# Patient Record
Sex: FEMALE | Race: WHITE | NOT HISPANIC OR LATINO | Employment: OTHER | URBAN - METROPOLITAN AREA
[De-identification: names, ages, dates, MRNs, and addresses within clinical notes are randomized per-mention and may not be internally consistent; named-entity substitution may affect disease eponyms.]

---

## 2017-01-12 ENCOUNTER — COMMUNICATION - HEALTHEAST (OUTPATIENT)
Dept: FAMILY MEDICINE | Facility: CLINIC | Age: 62
End: 2017-01-12

## 2017-01-25 ENCOUNTER — OFFICE VISIT - HEALTHEAST (OUTPATIENT)
Dept: FAMILY MEDICINE | Facility: CLINIC | Age: 62
End: 2017-01-25

## 2017-01-25 ENCOUNTER — COMMUNICATION - HEALTHEAST (OUTPATIENT)
Dept: SCHEDULING | Facility: CLINIC | Age: 62
End: 2017-01-25

## 2017-01-25 DIAGNOSIS — E78.5 HYPERLIPIDEMIA: ICD-10-CM

## 2017-01-25 DIAGNOSIS — E11.9 TYPE 2 DIABETES MELLITUS (H): ICD-10-CM

## 2017-01-25 DIAGNOSIS — R60.9 EDEMA: ICD-10-CM

## 2017-01-25 LAB — HBA1C MFR BLD: >14 % (ref 3.5–6)

## 2017-01-25 ASSESSMENT — MIFFLIN-ST. JEOR: SCORE: 1221.84

## 2017-01-26 ENCOUNTER — COMMUNICATION - HEALTHEAST (OUTPATIENT)
Dept: NURSING | Facility: CLINIC | Age: 62
End: 2017-01-26

## 2017-01-26 DIAGNOSIS — E66.9 OBESITY, UNSPECIFIED: ICD-10-CM

## 2017-01-26 LAB
ATRIAL RATE - MUSE: 62 BPM
DIASTOLIC BLOOD PRESSURE - MUSE: NORMAL MMHG
INTERPRETATION ECG - MUSE: NORMAL
P AXIS - MUSE: 17 DEGREES
PR INTERVAL - MUSE: 150 MS
QRS DURATION - MUSE: 90 MS
QT - MUSE: 440 MS
QTC - MUSE: 446 MS
R AXIS - MUSE: -7 DEGREES
SYSTOLIC BLOOD PRESSURE - MUSE: NORMAL MMHG
T AXIS - MUSE: 23 DEGREES
VENTRICULAR RATE- MUSE: 62 BPM

## 2017-02-02 ENCOUNTER — OFFICE VISIT - HEALTHEAST (OUTPATIENT)
Dept: NURSING | Facility: CLINIC | Age: 62
End: 2017-02-02

## 2017-02-02 ENCOUNTER — AMBULATORY - HEALTHEAST (OUTPATIENT)
Dept: NURSING | Facility: CLINIC | Age: 62
End: 2017-02-02

## 2017-02-02 ENCOUNTER — OFFICE VISIT - HEALTHEAST (OUTPATIENT)
Dept: FAMILY MEDICINE | Facility: CLINIC | Age: 62
End: 2017-02-02

## 2017-02-02 ENCOUNTER — COMMUNICATION - HEALTHEAST (OUTPATIENT)
Dept: NURSING | Facility: CLINIC | Age: 62
End: 2017-02-02

## 2017-02-02 DIAGNOSIS — E11.9 TYPE 2 DIABETES MELLITUS (H): ICD-10-CM

## 2017-02-02 DIAGNOSIS — E66.9 OBESITY, UNSPECIFIED: ICD-10-CM

## 2017-02-02 DIAGNOSIS — R60.9 EDEMA: ICD-10-CM

## 2017-02-02 ASSESSMENT — MIFFLIN-ST. JEOR: SCORE: 1162.88

## 2017-02-03 ASSESSMENT — MIFFLIN-ST. JEOR: SCORE: 1162.88

## 2017-02-05 ENCOUNTER — COMMUNICATION - HEALTHEAST (OUTPATIENT)
Dept: FAMILY MEDICINE | Facility: CLINIC | Age: 62
End: 2017-02-05

## 2017-02-06 ENCOUNTER — COMMUNICATION - HEALTHEAST (OUTPATIENT)
Dept: FAMILY MEDICINE | Facility: CLINIC | Age: 62
End: 2017-02-06

## 2017-02-09 ENCOUNTER — COMMUNICATION - HEALTHEAST (OUTPATIENT)
Dept: NURSING | Facility: CLINIC | Age: 62
End: 2017-02-09

## 2017-02-10 ENCOUNTER — COMMUNICATION - HEALTHEAST (OUTPATIENT)
Dept: NURSING | Facility: CLINIC | Age: 62
End: 2017-02-10

## 2017-02-14 ENCOUNTER — COMMUNICATION - HEALTHEAST (OUTPATIENT)
Dept: NURSING | Facility: CLINIC | Age: 62
End: 2017-02-14

## 2017-02-14 DIAGNOSIS — E66.9 OBESITY, UNSPECIFIED: ICD-10-CM

## 2017-02-14 DIAGNOSIS — E11.9 TYPE 2 DIABETES MELLITUS (H): ICD-10-CM

## 2017-02-15 ASSESSMENT — MIFFLIN-ST. JEOR: SCORE: 1117.52

## 2017-02-20 ENCOUNTER — HOSPITAL ENCOUNTER (OUTPATIENT)
Dept: CARDIOLOGY | Facility: CLINIC | Age: 62
Discharge: HOME | End: 2017-02-20
Attending: FAMILY MEDICINE

## 2017-02-20 DIAGNOSIS — R60.9 EDEMA: ICD-10-CM

## 2017-02-20 LAB
AORTIC ROOT: 2.9 CM
AORTIC VALVE MEAN VELOCITY: 98.2 CM/S
AV DIMENSIONLESS INDEX VTI: 0.7
AV MEAN GRADIENT: 4 MMHG
AV PEAK GRADIENT: 7.2 MMHG
AV VALVE AREA: 2.1 CM2
AV VELOCITY RATIO: 0.7
BSA FOR ECHO PROCEDURE: 1.65 M2
CV BLOOD PRESSURE: NORMAL MMHG
CV ECHO HEIGHT: 63 IN
CV ECHO WEIGHT: 135 LBS
DOP CALC AO PEAK VEL: 134 CM/S
DOP CALC AO VTI: 34.2 CM
DOP CALC LVOT AREA: 3.14 CM2
DOP CALC LVOT DIAMETER: 2 CM
DOP CALC LVOT PEAK VEL: 93 CM/S
DOP CALC LVOT STROKE VOLUME: 72.5 CM3
DOP CALCLVOT PEAK VEL VTI: 23.1 CM
EJECTION FRACTION: 64 % (ref 55–75)
INTERVENTRICULAR SEPTUM IN END DIASTOLE: 1.12 CM (ref 0.6–0.9)
IVS/PW RATIO: 1
LA AREA 1: 14.6 CM2
LA AREA 2: 23.2 CM2
LEFT ATRIUM LENGTH: 4.61 CM
LEFT ATRIUM SIZE: 3.2 CM
LEFT ATRIUM VOLUME INDEX: 37.9 ML/M2
LEFT ATRIUM VOLUME: 62.5 CM3
LEFT VENTRICLE CARDIAC INDEX: 3.2 L/MIN/M2
LEFT VENTRICLE CARDIAC OUTPUT: 5.2 L/MIN
LEFT VENTRICLE DIASTOLIC VOLUME INDEX: 37 CM3/M2 (ref 34–74)
LEFT VENTRICLE DIASTOLIC VOLUME: 61 CM3 (ref 46–106)
LEFT VENTRICLE HEART RATE: 72 BPM
LEFT VENTRICLE MASS INDEX: 117.6 G/M2
LEFT VENTRICLE SYSTOLIC VOLUME INDEX: 13.3 CM3/M2 (ref 11–31)
LEFT VENTRICLE SYSTOLIC VOLUME: 22 CM3 (ref 14–42)
LEFT VENTRICULAR INTERNAL DIMENSION IN DIASTOLE: 4.69 CM (ref 3.8–5.2)
LEFT VENTRICULAR MASS: 194.1 G
LEFT VENTRICULAR OUTFLOW TRACT MEAN GRADIENT: 2 MMHG
LEFT VENTRICULAR OUTFLOW TRACT MEAN VELOCITY: 56.2 CM/S
LEFT VENTRICULAR OUTFLOW TRACT PEAK GRADIENT: 3 MMHG
LEFT VENTRICULAR POSTERIOR WALL IN END DIASTOLE: 1.14 CM (ref 0.6–0.9)
LV STROKE VOLUME INDEX: 44 ML/M2
MITRAL VALVE E/A RATIO: 1.2
MV AVERAGE E/E' RATIO: 7.9 CM/S
MV DECELERATION TIME: 134 MS
MV E'TISSUE VEL-LAT: 9.75 CM/S
MV E'TISSUE VEL-MED: 9.16 CM/S
MV LATERAL E/E' RATIO: 7.7
MV MEDIAL E/E' RATIO: 8.2
MV PEAK A VELOCITY: 62.4 CM/S
MV PEAK E VELOCITY: 74.7 CM/S
NUC REST DIASTOLIC VOLUME INDEX: 2160 LBS
NUC REST SYSTOLIC VOLUME INDEX: 63 IN
PR MAX PG: 4 MMHG
PR PEAK VELOCITY: 105 CM/S
TRICUSPID REGURGITATION PEAK PRESSURE GRADIENT: 39.4 MMHG
TRICUSPID VALVE PEAK REGURGITANT VELOCITY: 314 CM/S

## 2017-02-20 RX ORDER — ASPIRIN 325 MG
325 TABLET ORAL DAILY
Status: SHIPPED | COMMUNITY
Start: 2017-02-20

## 2017-02-20 ASSESSMENT — MIFFLIN-ST. JEOR: SCORE: 1121.49

## 2017-02-21 ENCOUNTER — COMMUNICATION - HEALTHEAST (OUTPATIENT)
Dept: NURSING | Facility: CLINIC | Age: 62
End: 2017-02-21

## 2017-02-21 DIAGNOSIS — E11.9 TYPE 2 DIABETES MELLITUS (H): ICD-10-CM

## 2017-02-22 ENCOUNTER — OFFICE VISIT - HEALTHEAST (OUTPATIENT)
Dept: FAMILY MEDICINE | Facility: CLINIC | Age: 62
End: 2017-02-22

## 2017-02-22 DIAGNOSIS — I07.1 TRICUSPID REGURGITATION: ICD-10-CM

## 2017-02-22 DIAGNOSIS — M53.3 SI (SACROILIAC) PAIN: ICD-10-CM

## 2017-02-22 DIAGNOSIS — E11.9 TYPE 2 DIABETES MELLITUS (H): ICD-10-CM

## 2017-02-22 DIAGNOSIS — R60.9 EDEMA: ICD-10-CM

## 2017-02-22 ASSESSMENT — MIFFLIN-ST. JEOR: SCORE: 1094.27

## 2017-02-28 ENCOUNTER — AMBULATORY - HEALTHEAST (OUTPATIENT)
Dept: FAMILY MEDICINE | Facility: CLINIC | Age: 62
End: 2017-02-28

## 2017-02-28 DIAGNOSIS — R60.9 EDEMA: ICD-10-CM

## 2017-02-28 DIAGNOSIS — I07.1 TRICUSPID REGURGITATION: ICD-10-CM

## 2017-03-02 ENCOUNTER — COMMUNICATION - HEALTHEAST (OUTPATIENT)
Dept: NURSING | Facility: CLINIC | Age: 62
End: 2017-03-02

## 2017-03-02 DIAGNOSIS — E11.9 TYPE 2 DIABETES MELLITUS WITHOUT COMPLICATION, WITH LONG-TERM CURRENT USE OF INSULIN (H): ICD-10-CM

## 2017-03-02 DIAGNOSIS — Z79.4 TYPE 2 DIABETES MELLITUS WITHOUT COMPLICATION, WITH LONG-TERM CURRENT USE OF INSULIN (H): ICD-10-CM

## 2017-03-03 ENCOUNTER — HOSPITAL ENCOUNTER (OUTPATIENT)
Dept: CT IMAGING | Facility: CLINIC | Age: 62
Discharge: HOME | End: 2017-03-03
Attending: INTERNAL MEDICINE

## 2017-03-03 ENCOUNTER — OFFICE VISIT - HEALTHEAST (OUTPATIENT)
Dept: CARDIOLOGY | Facility: CLINIC | Age: 62
End: 2017-03-03

## 2017-03-03 ENCOUNTER — COMMUNICATION - HEALTHEAST (OUTPATIENT)
Dept: EDUCATION SERVICES | Facility: CLINIC | Age: 62
End: 2017-03-03

## 2017-03-03 DIAGNOSIS — R60.9 PITTING EDEMA: ICD-10-CM

## 2017-03-03 DIAGNOSIS — I27.20 PULMONARY HYPERTENSION, MILD (H): ICD-10-CM

## 2017-03-03 DIAGNOSIS — R07.9 CHEST PAIN AT REST: ICD-10-CM

## 2017-03-03 DIAGNOSIS — R06.09 EXERTIONAL DYSPNEA: ICD-10-CM

## 2017-03-03 DIAGNOSIS — D68.59 PRIMARY HYPERCOAGULABLE STATE (H): ICD-10-CM

## 2017-03-03 DIAGNOSIS — I36.1 TRICUSPID VALVE REGURGITATION, NONRHEUMATIC: ICD-10-CM

## 2017-03-03 DIAGNOSIS — I27.29 OTHER SECONDARY PULMONARY HYPERTENSION (H): ICD-10-CM

## 2017-03-03 DIAGNOSIS — R06.09 OTHER FORMS OF DYSPNEA: ICD-10-CM

## 2017-03-03 ASSESSMENT — MIFFLIN-ST. JEOR: SCORE: 1065.91

## 2017-03-06 ENCOUNTER — AMBULATORY - HEALTHEAST (OUTPATIENT)
Dept: CARDIOLOGY | Facility: CLINIC | Age: 62
End: 2017-03-06

## 2017-03-06 DIAGNOSIS — E11.9 DM (DIABETES MELLITUS) (H): ICD-10-CM

## 2017-03-06 DIAGNOSIS — R07.9 CHEST PAIN: ICD-10-CM

## 2017-03-06 DIAGNOSIS — R06.09 DYSPNEA ON EXERTION: ICD-10-CM

## 2017-03-06 DIAGNOSIS — O24.419 DM (DIABETES MELLITUS), GESTATIONAL: ICD-10-CM

## 2017-03-06 DIAGNOSIS — I51.7 CARDIOMEGALY: ICD-10-CM

## 2017-03-09 ENCOUNTER — HOSPITAL ENCOUNTER (OUTPATIENT)
Dept: CARDIOLOGY | Facility: HOSPITAL | Age: 62
Discharge: HOME | End: 2017-03-09
Attending: INTERNAL MEDICINE

## 2017-03-09 ENCOUNTER — COMMUNICATION - HEALTHEAST (OUTPATIENT)
Dept: NURSING | Facility: CLINIC | Age: 62
End: 2017-03-09

## 2017-03-09 DIAGNOSIS — E11.9 DM (DIABETES MELLITUS) (H): ICD-10-CM

## 2017-03-09 DIAGNOSIS — R07.9 CHEST PAIN: ICD-10-CM

## 2017-03-09 DIAGNOSIS — R06.09 OTHER FORMS OF DYSPNEA: ICD-10-CM

## 2017-03-09 DIAGNOSIS — R06.09 DYSPNEA ON EXERTION: ICD-10-CM

## 2017-03-09 DIAGNOSIS — I51.7 CARDIOMEGALY: ICD-10-CM

## 2017-03-09 LAB
CV STRESS CURRENT BP HE: NORMAL
CV STRESS CURRENT HR HE: 101
CV STRESS CURRENT HR HE: 102
CV STRESS CURRENT HR HE: 108
CV STRESS CURRENT HR HE: 109
CV STRESS CURRENT HR HE: 110
CV STRESS CURRENT HR HE: 111
CV STRESS CURRENT HR HE: 112
CV STRESS CURRENT HR HE: 121
CV STRESS CURRENT HR HE: 129
CV STRESS CURRENT HR HE: 134
CV STRESS CURRENT HR HE: 137
CV STRESS CURRENT HR HE: 137
CV STRESS CURRENT HR HE: 158
CV STRESS CURRENT HR HE: 158
CV STRESS CURRENT HR HE: 80
CV STRESS CURRENT HR HE: 82
CV STRESS CURRENT HR HE: 84
CV STRESS CURRENT HR HE: 84
CV STRESS CURRENT HR HE: 88
CV STRESS CURRENT HR HE: 90
CV STRESS CURRENT HR HE: 90
CV STRESS CURRENT HR HE: 92
CV STRESS CURRENT HR HE: 95
CV STRESS CURRENT HR HE: 97
CV STRESS CURRENT HR HE: 98
CV STRESS CURRENT HR HE: 99
CV STRESS DEVIATION TIME HE: NORMAL
CV STRESS ECHO PERCENT HR HE: NORMAL
CV STRESS EXERCISE STAGE HE: NORMAL
CV STRESS FINAL RESTING BP HE: NORMAL
CV STRESS FINAL RESTING HR HE: 95
CV STRESS MAX HR HE: 158
CV STRESS MAX TREADMILL GRADE HE: 14
CV STRESS MAX TREADMILL SPEED HE: 3.4
CV STRESS PEAK DIA BP HE: NORMAL
CV STRESS PEAK SYS BP HE: NORMAL
CV STRESS PHASE HE: NORMAL
CV STRESS PROTOCOL HE: NORMAL
CV STRESS RESTING PT POSITION HE: NORMAL
CV STRESS RESTING PT POSITION HE: NORMAL
CV STRESS ST DEVIATION AMOUNT HE: NORMAL
CV STRESS ST DEVIATION ELEVATION HE: NORMAL
CV STRESS ST EVELATION AMOUNT HE: NORMAL
CV STRESS TEST TYPE HE: NORMAL
CV STRESS TOTAL STAGE TIME MIN 1 HE: NORMAL
ECHO EJECTION FRACTION ESTIMATED: 65 %
STRESS ECHO BASELINE BP: NORMAL
STRESS ECHO BASELINE HR: 92
STRESS ECHO CALCULATED PERCENT HR: 99 %
STRESS ECHO LAST STRESS BP: NORMAL
STRESS ECHO LAST STRESS HR: 158
STRESS ECHO POST ESTIMATED WORKLOAD: 10.3
STRESS ECHO POST EXERCISE DUR MIN: 8
STRESS ECHO POST EXERCISE DUR SEC: 59
STRESS ECHO TARGET HR: 135

## 2017-03-14 ENCOUNTER — COMMUNICATION - HEALTHEAST (OUTPATIENT)
Dept: NURSING | Facility: CLINIC | Age: 62
End: 2017-03-14

## 2017-03-20 ENCOUNTER — COMMUNICATION - HEALTHEAST (OUTPATIENT)
Dept: FAMILY MEDICINE | Facility: CLINIC | Age: 62
End: 2017-03-20

## 2017-03-20 DIAGNOSIS — R60.9 PITTING EDEMA: ICD-10-CM

## 2017-03-29 ENCOUNTER — COMMUNICATION - HEALTHEAST (OUTPATIENT)
Dept: FAMILY MEDICINE | Facility: CLINIC | Age: 62
End: 2017-03-29

## 2017-03-31 ENCOUNTER — COMMUNICATION - HEALTHEAST (OUTPATIENT)
Dept: NURSING | Facility: CLINIC | Age: 62
End: 2017-03-31

## 2017-04-04 ENCOUNTER — OFFICE VISIT - HEALTHEAST (OUTPATIENT)
Dept: FAMILY MEDICINE | Facility: CLINIC | Age: 62
End: 2017-04-04

## 2017-04-04 ENCOUNTER — COMMUNICATION - HEALTHEAST (OUTPATIENT)
Dept: NURSING | Facility: CLINIC | Age: 62
End: 2017-04-04

## 2017-04-04 DIAGNOSIS — Z79.4 TYPE 2 DIABETES MELLITUS WITHOUT COMPLICATION, WITH LONG-TERM CURRENT USE OF INSULIN (H): ICD-10-CM

## 2017-04-04 DIAGNOSIS — I27.20 PULMONARY HYPERTENSION, MILD (H): ICD-10-CM

## 2017-04-04 DIAGNOSIS — E11.9 TYPE 2 DIABETES MELLITUS WITHOUT COMPLICATION, WITH LONG-TERM CURRENT USE OF INSULIN (H): ICD-10-CM

## 2017-04-04 DIAGNOSIS — R60.9 PITTING EDEMA: ICD-10-CM

## 2017-04-04 DIAGNOSIS — Z92.89 HISTORY OF MAMMOGRAPHY, SCREENING: ICD-10-CM

## 2017-04-04 DIAGNOSIS — R63.4 UNEXPLAINED WEIGHT LOSS: ICD-10-CM

## 2017-04-05 LAB
BASOPHILS # BLD AUTO: 0 THOU/UL (ref 0–0.2)
BASOPHILS NFR BLD AUTO: 0 % (ref 0–2)
EOSINOPHIL # BLD AUTO: 0.1 THOU/UL (ref 0–0.4)
EOSINOPHIL NFR BLD AUTO: 1 % (ref 0–6)
ERYTHROCYTE [DISTWIDTH] IN BLOOD BY AUTOMATED COUNT: 12.1 % (ref 11–14.5)
HCT VFR BLD AUTO: 41.2 % (ref 35–47)
HGB BLD-MCNC: 13.6 G/DL (ref 12–16)
LAB AP CHARGES (HE HISTORICAL CONVERSION): NORMAL
LYMPHOCYTES # BLD AUTO: 2 THOU/UL (ref 0.8–4.4)
LYMPHOCYTES NFR BLD AUTO: 30 % (ref 20–40)
MCH RBC QN AUTO: 25.6 PG (ref 27–34)
MCHC RBC AUTO-ENTMCNC: 33 G/DL (ref 32–36)
MCV RBC AUTO: 77 FL (ref 80–100)
MONOCYTES # BLD AUTO: 0.4 THOU/UL (ref 0–0.9)
MONOCYTES NFR BLD AUTO: 6 % (ref 2–10)
NEUTROPHILS # BLD AUTO: 4.3 THOU/UL (ref 2–7.7)
NEUTROPHILS NFR BLD AUTO: 63 % (ref 50–70)
PATH REPORT.COMMENTS IMP SPEC: NORMAL
PATH REPORT.COMMENTS IMP SPEC: NORMAL
PATH REPORT.FINAL DX SPEC: NORMAL
PATH REPORT.MICROSCOPIC SPEC OTHER STN: ABNORMAL
PATH REPORT.MICROSCOPIC SPEC OTHER STN: NORMAL
PATH REPORT.RELEVANT HX SPEC: NORMAL
PLATELET # BLD AUTO: 366 THOU/UL (ref 140–440)
PMV BLD AUTO: 10.3 FL (ref 8.5–12.5)
RBC # BLD AUTO: 5.32 MILL/UL (ref 3.8–5.4)
WBC: 6.9 THOU/UL (ref 4–11)

## 2017-04-06 ENCOUNTER — AMBULATORY - HEALTHEAST (OUTPATIENT)
Dept: EDUCATION SERVICES | Facility: CLINIC | Age: 62
End: 2017-04-06

## 2017-04-06 DIAGNOSIS — E11.9 TYPE 2 DIABETES MELLITUS WITHOUT COMPLICATION, WITH LONG-TERM CURRENT USE OF INSULIN (H): ICD-10-CM

## 2017-04-06 DIAGNOSIS — Z79.4 TYPE 2 DIABETES MELLITUS WITHOUT COMPLICATION, WITH LONG-TERM CURRENT USE OF INSULIN (H): ICD-10-CM

## 2017-04-06 LAB
GLIADIN IGA SER-ACNC: 0.8 U/ML
GLIADIN IGG SER-ACNC: 0.4 U/ML
IGA SERPL-MCNC: 190 MG/DL (ref 65–400)
TTG IGA SER-ACNC: 0.5 U/ML
TTG IGG SER-ACNC: 0.8 U/ML

## 2017-04-07 LAB
ALBUMIN PERCENT: 68 % (ref 51–67)
ALBUMIN SERPL ELPH-MCNC: 4.3 G/DL (ref 3.2–4.7)
ALPHA 1 PERCENT: 3 % (ref 2–4)
ALPHA 2 PERCENT: 10.1 % (ref 5–13)
ALPHA1 GLOB SERPL ELPH-MCNC: 0.2 G/DL (ref 0.1–0.3)
ALPHA2 GLOB SERPL ELPH-MCNC: 0.6 G/DL (ref 0.4–0.9)
B-GLOBULIN SERPL ELPH-MCNC: 0.7 G/DL (ref 0.7–1.2)
BETA PERCENT: 10.6 % (ref 10–17)
GAMMA GLOB SERPL ELPH-MCNC: 0.5 G/DL (ref 0.6–1.4)
GAMMA GLOBULIN PERCENT: 8.3 % (ref 9–20)
PATH ICD:: ABNORMAL
PATH ICD:: NORMAL
PROT PATTERN SERPL ELPH-IMP: ABNORMAL
PROT PATTERN SERPL ELPH-IMP: NORMAL
PROT SERPL-MCNC: 6.3 G/DL (ref 6–8)
REVIEWING PATHOLOGIST: ABNORMAL
REVIEWING PATHOLOGIST: NORMAL
TOTAL PROTEIN RANDOM URINE MG/DL: 17 MG/DL

## 2017-04-11 ENCOUNTER — COMMUNICATION - HEALTHEAST (OUTPATIENT)
Dept: FAMILY MEDICINE | Facility: CLINIC | Age: 62
End: 2017-04-11

## 2017-04-12 ENCOUNTER — COMMUNICATION - HEALTHEAST (OUTPATIENT)
Dept: NURSING | Facility: CLINIC | Age: 62
End: 2017-04-12

## 2017-04-18 ENCOUNTER — COMMUNICATION - HEALTHEAST (OUTPATIENT)
Dept: FAMILY MEDICINE | Facility: CLINIC | Age: 62
End: 2017-04-18

## 2017-04-20 ENCOUNTER — COMMUNICATION - HEALTHEAST (OUTPATIENT)
Dept: NURSING | Facility: CLINIC | Age: 62
End: 2017-04-20

## 2017-04-24 ENCOUNTER — COMMUNICATION - HEALTHEAST (OUTPATIENT)
Dept: FAMILY MEDICINE | Facility: CLINIC | Age: 62
End: 2017-04-24

## 2017-04-25 ENCOUNTER — OFFICE VISIT - HEALTHEAST (OUTPATIENT)
Dept: FAMILY MEDICINE | Facility: CLINIC | Age: 62
End: 2017-04-25

## 2017-04-25 DIAGNOSIS — R60.9 PITTING EDEMA: ICD-10-CM

## 2017-04-25 DIAGNOSIS — Z12.31 SCREENING MAMMOGRAM, ENCOUNTER FOR: ICD-10-CM

## 2017-04-25 DIAGNOSIS — Z79.4 TYPE 2 DIABETES MELLITUS WITHOUT COMPLICATION, WITH LONG-TERM CURRENT USE OF INSULIN (H): ICD-10-CM

## 2017-04-25 DIAGNOSIS — E11.9 TYPE 2 DIABETES MELLITUS WITHOUT COMPLICATION, WITH LONG-TERM CURRENT USE OF INSULIN (H): ICD-10-CM

## 2017-04-25 DIAGNOSIS — I27.20 PULMONARY HYPERTENSION, MILD (H): ICD-10-CM

## 2017-04-25 LAB — HBA1C MFR BLD: 12.5 % (ref 3.5–6)

## 2017-04-25 ASSESSMENT — MIFFLIN-ST. JEOR: SCORE: 1006.94

## 2017-04-26 ENCOUNTER — COMMUNICATION - HEALTHEAST (OUTPATIENT)
Dept: FAMILY MEDICINE | Facility: CLINIC | Age: 62
End: 2017-04-26

## 2017-05-01 ENCOUNTER — HOSPITAL ENCOUNTER (OUTPATIENT)
Dept: CT IMAGING | Facility: CLINIC | Age: 62
Discharge: HOME | End: 2017-05-01
Attending: FAMILY MEDICINE

## 2017-05-01 DIAGNOSIS — R60.9 PITTING EDEMA: ICD-10-CM

## 2017-05-01 DIAGNOSIS — R63.4 UNEXPLAINED WEIGHT LOSS: ICD-10-CM

## 2017-05-02 ENCOUNTER — COMMUNICATION - HEALTHEAST (OUTPATIENT)
Dept: FAMILY MEDICINE | Facility: CLINIC | Age: 62
End: 2017-05-02

## 2017-05-02 DIAGNOSIS — N85.2 ENLARGED UTERUS: ICD-10-CM

## 2017-05-04 ENCOUNTER — COMMUNICATION - HEALTHEAST (OUTPATIENT)
Dept: NURSING | Facility: CLINIC | Age: 62
End: 2017-05-04

## 2017-05-04 ENCOUNTER — HOSPITAL ENCOUNTER (OUTPATIENT)
Dept: ULTRASOUND IMAGING | Facility: CLINIC | Age: 62
Discharge: HOME | End: 2017-05-04
Attending: FAMILY MEDICINE

## 2017-05-04 DIAGNOSIS — N85.2 ENLARGED UTERUS: ICD-10-CM

## 2017-05-05 ENCOUNTER — COMMUNICATION - HEALTHEAST (OUTPATIENT)
Dept: FAMILY MEDICINE | Facility: CLINIC | Age: 62
End: 2017-05-05

## 2017-05-19 ENCOUNTER — COMMUNICATION - HEALTHEAST (OUTPATIENT)
Dept: NURSING | Facility: CLINIC | Age: 62
End: 2017-05-19

## 2017-06-12 ENCOUNTER — COMMUNICATION - HEALTHEAST (OUTPATIENT)
Dept: NURSING | Facility: CLINIC | Age: 62
End: 2017-06-12

## 2017-06-19 ENCOUNTER — COMMUNICATION - HEALTHEAST (OUTPATIENT)
Dept: NURSING | Facility: CLINIC | Age: 62
End: 2017-06-19

## 2017-06-22 ENCOUNTER — OFFICE VISIT - HEALTHEAST (OUTPATIENT)
Dept: CARDIOLOGY | Facility: CLINIC | Age: 62
End: 2017-06-22

## 2017-06-22 DIAGNOSIS — R07.89 ATYPICAL CHEST PAIN: ICD-10-CM

## 2017-06-22 DIAGNOSIS — E11.9 TYPE 2 DIABETES MELLITUS WITHOUT COMPLICATION, WITH LONG-TERM CURRENT USE OF INSULIN (H): ICD-10-CM

## 2017-06-22 DIAGNOSIS — I36.1 TRICUSPID VALVE REGURGITATION, NONRHEUMATIC: ICD-10-CM

## 2017-06-22 DIAGNOSIS — I27.20 PULMONARY HYPERTENSION, MILD (H): ICD-10-CM

## 2017-06-22 DIAGNOSIS — Z79.4 TYPE 2 DIABETES MELLITUS WITHOUT COMPLICATION, WITH LONG-TERM CURRENT USE OF INSULIN (H): ICD-10-CM

## 2017-06-22 ASSESSMENT — MIFFLIN-ST. JEOR: SCORE: 1011.48

## 2017-06-27 ENCOUNTER — COMMUNICATION - HEALTHEAST (OUTPATIENT)
Dept: NURSING | Facility: CLINIC | Age: 62
End: 2017-06-27

## 2017-07-05 ENCOUNTER — COMMUNICATION - HEALTHEAST (OUTPATIENT)
Dept: NURSING | Facility: CLINIC | Age: 62
End: 2017-07-05

## 2017-07-10 ENCOUNTER — COMMUNICATION - HEALTHEAST (OUTPATIENT)
Dept: NURSING | Facility: CLINIC | Age: 62
End: 2017-07-10

## 2017-07-21 ENCOUNTER — COMMUNICATION - HEALTHEAST (OUTPATIENT)
Dept: NURSING | Facility: CLINIC | Age: 62
End: 2017-07-21

## 2017-07-27 ENCOUNTER — COMMUNICATION - HEALTHEAST (OUTPATIENT)
Dept: NURSING | Facility: CLINIC | Age: 62
End: 2017-07-27

## 2017-08-04 ENCOUNTER — COMMUNICATION - HEALTHEAST (OUTPATIENT)
Dept: NURSING | Facility: CLINIC | Age: 62
End: 2017-08-04

## 2017-08-04 DIAGNOSIS — E11.9 TYPE 2 DIABETES MELLITUS (H): ICD-10-CM

## 2017-08-08 ENCOUNTER — COMMUNICATION - HEALTHEAST (OUTPATIENT)
Dept: NURSING | Facility: CLINIC | Age: 62
End: 2017-08-08

## 2017-08-11 ENCOUNTER — COMMUNICATION - HEALTHEAST (OUTPATIENT)
Dept: NURSING | Facility: CLINIC | Age: 62
End: 2017-08-11

## 2017-08-14 ENCOUNTER — COMMUNICATION - HEALTHEAST (OUTPATIENT)
Dept: FAMILY MEDICINE | Facility: CLINIC | Age: 62
End: 2017-08-14

## 2017-08-14 DIAGNOSIS — Z79.4 TYPE 2 DIABETES MELLITUS WITHOUT COMPLICATION, WITH LONG-TERM CURRENT USE OF INSULIN (H): ICD-10-CM

## 2017-08-14 DIAGNOSIS — E11.9 TYPE 2 DIABETES MELLITUS WITHOUT COMPLICATION, WITH LONG-TERM CURRENT USE OF INSULIN (H): ICD-10-CM

## 2017-08-17 ENCOUNTER — COMMUNICATION - HEALTHEAST (OUTPATIENT)
Dept: NURSING | Facility: CLINIC | Age: 62
End: 2017-08-17

## 2017-08-23 ENCOUNTER — AMBULATORY - HEALTHEAST (OUTPATIENT)
Dept: CARE COORDINATION | Facility: CLINIC | Age: 62
End: 2017-08-23

## 2017-08-28 ENCOUNTER — COMMUNICATION - HEALTHEAST (OUTPATIENT)
Dept: NURSING | Facility: CLINIC | Age: 62
End: 2017-08-28

## 2017-10-20 ENCOUNTER — COMMUNICATION - HEALTHEAST (OUTPATIENT)
Dept: FAMILY MEDICINE | Facility: CLINIC | Age: 62
End: 2017-10-20

## 2017-10-20 DIAGNOSIS — E11.9 TYPE 2 DIABETES MELLITUS WITHOUT COMPLICATION, WITH LONG-TERM CURRENT USE OF INSULIN (H): ICD-10-CM

## 2017-10-20 DIAGNOSIS — Z79.4 TYPE 2 DIABETES MELLITUS WITHOUT COMPLICATION, WITH LONG-TERM CURRENT USE OF INSULIN (H): ICD-10-CM

## 2017-11-15 ENCOUNTER — COMMUNICATION - HEALTHEAST (OUTPATIENT)
Dept: FAMILY MEDICINE | Facility: CLINIC | Age: 62
End: 2017-11-15

## 2017-11-17 ENCOUNTER — COMMUNICATION - HEALTHEAST (OUTPATIENT)
Dept: FAMILY MEDICINE | Facility: CLINIC | Age: 62
End: 2017-11-17

## 2017-11-17 DIAGNOSIS — E11.9 TYPE 2 DIABETES MELLITUS WITHOUT COMPLICATION, WITH LONG-TERM CURRENT USE OF INSULIN (H): ICD-10-CM

## 2017-11-17 DIAGNOSIS — Z79.4 TYPE 2 DIABETES MELLITUS WITHOUT COMPLICATION, WITH LONG-TERM CURRENT USE OF INSULIN (H): ICD-10-CM

## 2017-12-11 ENCOUNTER — COMMUNICATION - HEALTHEAST (OUTPATIENT)
Dept: FAMILY MEDICINE | Facility: CLINIC | Age: 62
End: 2017-12-11

## 2017-12-11 ENCOUNTER — OFFICE VISIT - HEALTHEAST (OUTPATIENT)
Dept: FAMILY MEDICINE | Facility: CLINIC | Age: 62
End: 2017-12-11

## 2017-12-11 DIAGNOSIS — Z12.31 SCREENING MAMMOGRAM, ENCOUNTER FOR: ICD-10-CM

## 2017-12-11 DIAGNOSIS — Z79.4 TYPE 2 DIABETES MELLITUS WITHOUT COMPLICATION, WITH LONG-TERM CURRENT USE OF INSULIN (H): ICD-10-CM

## 2017-12-11 DIAGNOSIS — E78.00 PURE HYPERCHOLESTEROLEMIA: ICD-10-CM

## 2017-12-11 DIAGNOSIS — I27.20 PULMONARY HYPERTENSION, MILD (H): ICD-10-CM

## 2017-12-11 DIAGNOSIS — Z23 NEED FOR VACCINATION: ICD-10-CM

## 2017-12-11 DIAGNOSIS — Z12.11 SCREEN FOR COLON CANCER: ICD-10-CM

## 2017-12-11 DIAGNOSIS — D68.59 PRIMARY HYPERCOAGULABLE STATE (H): ICD-10-CM

## 2017-12-11 DIAGNOSIS — E11.9 TYPE 2 DIABETES MELLITUS WITHOUT COMPLICATION, WITH LONG-TERM CURRENT USE OF INSULIN (H): ICD-10-CM

## 2017-12-11 DIAGNOSIS — I36.1 TRICUSPID VALVE REGURGITATION, NONRHEUMATIC: ICD-10-CM

## 2017-12-11 DIAGNOSIS — Z00.00 ROUTINE GENERAL MEDICAL EXAMINATION AT A HEALTH CARE FACILITY: ICD-10-CM

## 2017-12-11 LAB
CHOLEST SERPL-MCNC: 158 MG/DL
FASTING STATUS PATIENT QL REPORTED: NORMAL
HBA1C MFR BLD: 10.1 % (ref 3.5–6)
HDLC SERPL-MCNC: 68 MG/DL
LDLC SERPL CALC-MCNC: 78 MG/DL
TRIGL SERPL-MCNC: 60 MG/DL

## 2017-12-11 ASSESSMENT — MIFFLIN-ST. JEOR: SCORE: 1021.47

## 2017-12-14 LAB
HPV INTERPRETATION - HISTORICAL: NORMAL
HPV INTERPRETER - HISTORICAL: NORMAL

## 2017-12-18 ENCOUNTER — COMMUNICATION - HEALTHEAST (OUTPATIENT)
Dept: ADMINISTRATIVE | Facility: CLINIC | Age: 62
End: 2017-12-18

## 2018-01-10 ENCOUNTER — COMMUNICATION - HEALTHEAST (OUTPATIENT)
Dept: FAMILY MEDICINE | Facility: CLINIC | Age: 63
End: 2018-01-10

## 2018-02-09 ENCOUNTER — COMMUNICATION - HEALTHEAST (OUTPATIENT)
Dept: ADMINISTRATIVE | Facility: CLINIC | Age: 63
End: 2018-02-09

## 2018-02-19 ENCOUNTER — COMMUNICATION - HEALTHEAST (OUTPATIENT)
Dept: FAMILY MEDICINE | Facility: CLINIC | Age: 63
End: 2018-02-19

## 2018-02-19 DIAGNOSIS — E78.5 HYPERLIPIDEMIA: ICD-10-CM

## 2018-03-29 ENCOUNTER — COMMUNICATION - HEALTHEAST (OUTPATIENT)
Dept: ADMINISTRATIVE | Facility: CLINIC | Age: 63
End: 2018-03-29

## 2018-04-06 ENCOUNTER — COMMUNICATION - HEALTHEAST (OUTPATIENT)
Dept: NURSING | Facility: CLINIC | Age: 63
End: 2018-04-06

## 2018-04-07 ENCOUNTER — COMMUNICATION - HEALTHEAST (OUTPATIENT)
Dept: NURSING | Facility: CLINIC | Age: 63
End: 2018-04-07

## 2018-04-07 DIAGNOSIS — E11.9 TYPE 2 DIABETES MELLITUS (H): ICD-10-CM

## 2018-04-08 ENCOUNTER — COMMUNICATION - HEALTHEAST (OUTPATIENT)
Dept: FAMILY MEDICINE | Facility: CLINIC | Age: 63
End: 2018-04-08

## 2018-04-08 DIAGNOSIS — E11.9 TYPE 2 DIABETES MELLITUS (H): ICD-10-CM

## 2018-04-09 ENCOUNTER — COMMUNICATION - HEALTHEAST (OUTPATIENT)
Dept: NURSING | Facility: CLINIC | Age: 63
End: 2018-04-09

## 2018-04-23 ENCOUNTER — RECORDS - HEALTHEAST (OUTPATIENT)
Dept: ADMINISTRATIVE | Facility: OTHER | Age: 63
End: 2018-04-23

## 2018-06-07 ENCOUNTER — COMMUNICATION - HEALTHEAST (OUTPATIENT)
Dept: FAMILY MEDICINE | Facility: CLINIC | Age: 63
End: 2018-06-07

## 2018-06-07 DIAGNOSIS — E11.9 TYPE 2 DIABETES MELLITUS WITHOUT COMPLICATION, WITH LONG-TERM CURRENT USE OF INSULIN (H): ICD-10-CM

## 2018-06-07 DIAGNOSIS — Z79.4 TYPE 2 DIABETES MELLITUS WITHOUT COMPLICATION, WITH LONG-TERM CURRENT USE OF INSULIN (H): ICD-10-CM

## 2018-07-25 ENCOUNTER — COMMUNICATION - HEALTHEAST (OUTPATIENT)
Dept: FAMILY MEDICINE | Facility: CLINIC | Age: 63
End: 2018-07-25

## 2018-09-04 ENCOUNTER — COMMUNICATION - HEALTHEAST (OUTPATIENT)
Dept: FAMILY MEDICINE | Facility: CLINIC | Age: 63
End: 2018-09-04

## 2018-09-04 DIAGNOSIS — E11.9 TYPE 2 DIABETES MELLITUS WITHOUT COMPLICATION, WITH LONG-TERM CURRENT USE OF INSULIN (H): ICD-10-CM

## 2018-09-04 DIAGNOSIS — Z79.4 TYPE 2 DIABETES MELLITUS WITHOUT COMPLICATION, WITH LONG-TERM CURRENT USE OF INSULIN (H): ICD-10-CM

## 2018-09-26 ENCOUNTER — AMBULATORY - HEALTHEAST (OUTPATIENT)
Dept: FAMILY MEDICINE | Facility: CLINIC | Age: 63
End: 2018-09-26

## 2018-09-26 DIAGNOSIS — E11.9 DIABETES MELLITUS, TYPE 2 (H): ICD-10-CM

## 2018-12-02 ENCOUNTER — COMMUNICATION - HEALTHEAST (OUTPATIENT)
Dept: FAMILY MEDICINE | Facility: CLINIC | Age: 63
End: 2018-12-02

## 2018-12-02 DIAGNOSIS — E11.9 TYPE 2 DIABETES MELLITUS WITHOUT COMPLICATION, WITH LONG-TERM CURRENT USE OF INSULIN (H): ICD-10-CM

## 2018-12-02 DIAGNOSIS — Z79.4 TYPE 2 DIABETES MELLITUS WITHOUT COMPLICATION, WITH LONG-TERM CURRENT USE OF INSULIN (H): ICD-10-CM

## 2018-12-06 ENCOUNTER — COMMUNICATION - HEALTHEAST (OUTPATIENT)
Dept: FAMILY MEDICINE | Facility: CLINIC | Age: 63
End: 2018-12-06

## 2018-12-06 DIAGNOSIS — Z79.4 TYPE 2 DIABETES MELLITUS WITHOUT COMPLICATION, WITH LONG-TERM CURRENT USE OF INSULIN (H): ICD-10-CM

## 2018-12-06 DIAGNOSIS — E11.9 TYPE 2 DIABETES MELLITUS WITHOUT COMPLICATION, WITH LONG-TERM CURRENT USE OF INSULIN (H): ICD-10-CM

## 2018-12-29 ENCOUNTER — COMMUNICATION - HEALTHEAST (OUTPATIENT)
Dept: FAMILY MEDICINE | Facility: CLINIC | Age: 63
End: 2018-12-29

## 2018-12-29 DIAGNOSIS — Z79.4 TYPE 2 DIABETES MELLITUS WITHOUT COMPLICATION, WITH LONG-TERM CURRENT USE OF INSULIN (H): ICD-10-CM

## 2018-12-29 DIAGNOSIS — E11.9 TYPE 2 DIABETES MELLITUS WITHOUT COMPLICATION, WITH LONG-TERM CURRENT USE OF INSULIN (H): ICD-10-CM

## 2019-01-02 ENCOUNTER — COMMUNICATION - HEALTHEAST (OUTPATIENT)
Dept: FAMILY MEDICINE | Facility: CLINIC | Age: 64
End: 2019-01-02

## 2019-01-02 DIAGNOSIS — Z79.4 TYPE 2 DIABETES MELLITUS WITHOUT COMPLICATION, WITH LONG-TERM CURRENT USE OF INSULIN (H): ICD-10-CM

## 2019-01-02 DIAGNOSIS — E11.9 TYPE 2 DIABETES MELLITUS WITHOUT COMPLICATION, WITH LONG-TERM CURRENT USE OF INSULIN (H): ICD-10-CM

## 2019-02-09 ENCOUNTER — COMMUNICATION - HEALTHEAST (OUTPATIENT)
Dept: FAMILY MEDICINE | Facility: CLINIC | Age: 64
End: 2019-02-09

## 2019-02-09 DIAGNOSIS — E78.5 HYPERLIPIDEMIA: ICD-10-CM

## 2019-02-21 ENCOUNTER — OFFICE VISIT - HEALTHEAST (OUTPATIENT)
Dept: FAMILY MEDICINE | Facility: CLINIC | Age: 64
End: 2019-02-21

## 2019-02-21 ENCOUNTER — COMMUNICATION - HEALTHEAST (OUTPATIENT)
Dept: FAMILY MEDICINE | Facility: CLINIC | Age: 64
End: 2019-02-21

## 2019-02-21 ENCOUNTER — COMMUNICATION - HEALTHEAST (OUTPATIENT)
Dept: TELEHEALTH | Facility: CLINIC | Age: 64
End: 2019-02-21

## 2019-02-21 DIAGNOSIS — Z79.4 TYPE 2 DIABETES MELLITUS WITH DIABETIC POLYNEUROPATHY, WITH LONG-TERM CURRENT USE OF INSULIN (H): ICD-10-CM

## 2019-02-21 DIAGNOSIS — Z12.31 VISIT FOR SCREENING MAMMOGRAM: ICD-10-CM

## 2019-02-21 DIAGNOSIS — E11.42 TYPE 2 DIABETES MELLITUS WITH DIABETIC POLYNEUROPATHY, WITH LONG-TERM CURRENT USE OF INSULIN (H): ICD-10-CM

## 2019-02-21 DIAGNOSIS — Z12.11 SCREEN FOR COLON CANCER: ICD-10-CM

## 2019-02-21 LAB
ALBUMIN SERPL-MCNC: 4.2 G/DL (ref 3.5–5)
ALP SERPL-CCNC: 102 U/L (ref 45–120)
ALT SERPL W P-5'-P-CCNC: 27 U/L (ref 0–45)
ANION GAP SERPL CALCULATED.3IONS-SCNC: 7 MMOL/L (ref 5–18)
AST SERPL W P-5'-P-CCNC: 21 U/L (ref 0–40)
BILIRUB SERPL-MCNC: 0.6 MG/DL (ref 0–1)
BUN SERPL-MCNC: 8 MG/DL (ref 8–22)
CALCIUM SERPL-MCNC: 9.8 MG/DL (ref 8.5–10.5)
CHLORIDE BLD-SCNC: 93 MMOL/L (ref 98–107)
CO2 SERPL-SCNC: 42 MMOL/L (ref 22–31)
CREAT SERPL-MCNC: 0.73 MG/DL (ref 0.6–1.1)
GFR SERPL CREATININE-BSD FRML MDRD: >60 ML/MIN/1.73M2
GLUCOSE BLD-MCNC: 217 MG/DL (ref 70–125)
HBA1C MFR BLD: 13.7 % (ref 3.5–6)
POTASSIUM BLD-SCNC: 3.1 MMOL/L (ref 3.5–5)
PROT SERPL-MCNC: 6.8 G/DL (ref 6–8)
SODIUM SERPL-SCNC: 142 MMOL/L (ref 136–145)

## 2019-02-21 ASSESSMENT — MIFFLIN-ST. JEOR: SCORE: 1127.15

## 2019-02-24 RX ORDER — GLUCOSAMINE HCL/CHONDROITIN SU 500-400 MG
CAPSULE ORAL
Qty: 100 STRIP | Refills: 11 | Status: SHIPPED | OUTPATIENT
Start: 2019-02-24 | End: 2022-11-09

## 2019-03-04 ENCOUNTER — COMMUNICATION - HEALTHEAST (OUTPATIENT)
Dept: FAMILY MEDICINE | Facility: CLINIC | Age: 64
End: 2019-03-04

## 2019-03-04 DIAGNOSIS — E87.6 HYPOKALEMIA: ICD-10-CM

## 2019-03-04 DIAGNOSIS — R06.89 HYPERCARBIA: ICD-10-CM

## 2019-03-06 ENCOUNTER — AMBULATORY - HEALTHEAST (OUTPATIENT)
Dept: FAMILY MEDICINE | Facility: CLINIC | Age: 64
End: 2019-03-06

## 2019-03-06 ENCOUNTER — AMBULATORY - HEALTHEAST (OUTPATIENT)
Dept: LAB | Facility: CLINIC | Age: 64
End: 2019-03-06

## 2019-03-06 ENCOUNTER — COMMUNICATION - HEALTHEAST (OUTPATIENT)
Dept: FAMILY MEDICINE | Facility: CLINIC | Age: 64
End: 2019-03-06

## 2019-03-06 DIAGNOSIS — E87.6 HYPOKALEMIA: ICD-10-CM

## 2019-03-06 DIAGNOSIS — R06.89 HYPERCARBIA: ICD-10-CM

## 2019-03-06 LAB
ANION GAP SERPL CALCULATED.3IONS-SCNC: 16 MMOL/L (ref 5–18)
BUN SERPL-MCNC: 11 MG/DL (ref 8–22)
CALCIUM SERPL-MCNC: 9.4 MG/DL (ref 8.5–10.5)
CHLORIDE BLD-SCNC: 91 MMOL/L (ref 98–107)
CO2 SERPL-SCNC: 36 MMOL/L (ref 22–31)
CREAT SERPL-MCNC: 0.69 MG/DL (ref 0.6–1.1)
GFR SERPL CREATININE-BSD FRML MDRD: >60 ML/MIN/1.73M2
GLUCOSE BLD-MCNC: 280 MG/DL (ref 70–125)
KETONES BLD-SCNC: 0.21 MMOL/L
POTASSIUM BLD-SCNC: 2.7 MMOL/L (ref 3.5–5)
SODIUM SERPL-SCNC: 143 MMOL/L (ref 136–145)

## 2019-03-07 ENCOUNTER — HOSPITAL ENCOUNTER (OUTPATIENT)
Dept: MAMMOGRAPHY | Facility: CLINIC | Age: 64
Discharge: HOME | End: 2019-03-07
Attending: FAMILY MEDICINE

## 2019-03-07 ENCOUNTER — COMMUNICATION - HEALTHEAST (OUTPATIENT)
Dept: FAMILY MEDICINE | Facility: CLINIC | Age: 64
End: 2019-03-07

## 2019-03-07 DIAGNOSIS — Z12.31 VISIT FOR SCREENING MAMMOGRAM: ICD-10-CM

## 2019-03-13 ENCOUNTER — COMMUNICATION - HEALTHEAST (OUTPATIENT)
Dept: FAMILY MEDICINE | Facility: CLINIC | Age: 64
End: 2019-03-13

## 2019-03-13 ENCOUNTER — COMMUNICATION - HEALTHEAST (OUTPATIENT)
Dept: SCHEDULING | Facility: CLINIC | Age: 64
End: 2019-03-13

## 2019-03-13 ENCOUNTER — AMBULATORY - HEALTHEAST (OUTPATIENT)
Dept: LAB | Facility: CLINIC | Age: 64
End: 2019-03-13

## 2019-03-13 DIAGNOSIS — E11.9 TYPE 2 DIABETES MELLITUS WITHOUT COMPLICATION, WITH LONG-TERM CURRENT USE OF INSULIN (H): ICD-10-CM

## 2019-03-13 DIAGNOSIS — Z79.4 TYPE 2 DIABETES MELLITUS WITHOUT COMPLICATION, WITH LONG-TERM CURRENT USE OF INSULIN (H): ICD-10-CM

## 2019-03-13 DIAGNOSIS — E87.6 HYPOKALEMIA: ICD-10-CM

## 2019-03-13 LAB
ANION GAP SERPL CALCULATED.3IONS-SCNC: 14 MMOL/L (ref 5–18)
BUN SERPL-MCNC: 10 MG/DL (ref 8–22)
CALCIUM SERPL-MCNC: 9.3 MG/DL (ref 8.5–10.5)
CHLORIDE BLD-SCNC: 94 MMOL/L (ref 98–107)
CO2 SERPL-SCNC: 35 MMOL/L (ref 22–31)
CREAT SERPL-MCNC: 0.77 MG/DL (ref 0.6–1.1)
GFR SERPL CREATININE-BSD FRML MDRD: >60 ML/MIN/1.73M2
GLUCOSE BLD-MCNC: 410 MG/DL (ref 70–125)
POTASSIUM BLD-SCNC: 2.6 MMOL/L (ref 3.5–5)
SODIUM SERPL-SCNC: 143 MMOL/L (ref 136–145)

## 2019-03-15 LAB
ANION GAP SERPL CALCULATED.3IONS-SCNC: 18 MMOL/L (ref 5–18)
BUN SERPL-MCNC: 9 MG/DL (ref 8–22)
CALCIUM SERPL-MCNC: 9.8 MG/DL (ref 8.5–10.5)
CHLORIDE BLD-SCNC: 97 MMOL/L (ref 98–107)
CO2 SERPL-SCNC: 30 MMOL/L (ref 22–31)
CREAT SERPL-MCNC: 0.6 MG/DL (ref 0.6–1.1)
GFR SERPL CREATININE-BSD FRML MDRD: >60 ML/MIN/1.73M2
GLUCOSE BLD-MCNC: 165 MG/DL (ref 70–125)
MAGNESIUM SERPL-MCNC: 1.8 MG/DL (ref 1.8–2.6)
POTASSIUM BLD-SCNC: 3 MMOL/L (ref 3.5–5)
SODIUM SERPL-SCNC: 145 MMOL/L (ref 136–145)

## 2019-03-21 ENCOUNTER — COMMUNICATION - HEALTHEAST (OUTPATIENT)
Dept: LAB | Facility: CLINIC | Age: 64
End: 2019-03-21

## 2019-03-21 DIAGNOSIS — E87.6 HYPOKALEMIA: ICD-10-CM

## 2019-03-22 ENCOUNTER — AMBULATORY - HEALTHEAST (OUTPATIENT)
Dept: LAB | Facility: CLINIC | Age: 64
End: 2019-03-22

## 2019-03-22 DIAGNOSIS — E87.6 HYPOKALEMIA: ICD-10-CM

## 2019-03-22 LAB
ANION GAP SERPL CALCULATED.3IONS-SCNC: 12 MMOL/L (ref 5–18)
BUN SERPL-MCNC: 9 MG/DL (ref 8–22)
CALCIUM SERPL-MCNC: 9.8 MG/DL (ref 8.5–10.5)
CHLORIDE BLD-SCNC: 96 MMOL/L (ref 98–107)
CO2 SERPL-SCNC: 36 MMOL/L (ref 22–31)
CREAT SERPL-MCNC: 0.65 MG/DL (ref 0.6–1.1)
GFR SERPL CREATININE-BSD FRML MDRD: >60 ML/MIN/1.73M2
GLUCOSE BLD-MCNC: 206 MG/DL (ref 70–125)
POTASSIUM BLD-SCNC: 3.2 MMOL/L (ref 3.5–5)
SODIUM SERPL-SCNC: 144 MMOL/L (ref 136–145)

## 2019-03-25 ENCOUNTER — AMBULATORY - HEALTHEAST (OUTPATIENT)
Dept: FAMILY MEDICINE | Facility: CLINIC | Age: 64
End: 2019-03-25

## 2019-03-25 DIAGNOSIS — E87.6 HYPOKALEMIA: ICD-10-CM

## 2019-03-29 ENCOUNTER — OFFICE VISIT - HEALTHEAST (OUTPATIENT)
Dept: EDUCATION SERVICES | Facility: CLINIC | Age: 64
End: 2019-03-29

## 2019-03-29 ENCOUNTER — COMMUNICATION - HEALTHEAST (OUTPATIENT)
Dept: FAMILY MEDICINE | Facility: CLINIC | Age: 64
End: 2019-03-29

## 2019-03-29 ENCOUNTER — AMBULATORY - HEALTHEAST (OUTPATIENT)
Dept: LAB | Facility: CLINIC | Age: 64
End: 2019-03-29

## 2019-03-29 DIAGNOSIS — E11.9 TYPE 2 DIABETES MELLITUS WITHOUT COMPLICATION, WITHOUT LONG-TERM CURRENT USE OF INSULIN (H): ICD-10-CM

## 2019-03-29 DIAGNOSIS — E87.6 HYPOKALEMIA: ICD-10-CM

## 2019-03-29 DIAGNOSIS — Z79.4 TYPE 2 DIABETES MELLITUS WITH DIABETIC POLYNEUROPATHY, WITH LONG-TERM CURRENT USE OF INSULIN (H): ICD-10-CM

## 2019-03-29 DIAGNOSIS — Z79.4 TYPE 2 DIABETES MELLITUS WITHOUT COMPLICATION, WITH LONG-TERM CURRENT USE OF INSULIN (H): ICD-10-CM

## 2019-03-29 DIAGNOSIS — E11.42 TYPE 2 DIABETES MELLITUS WITH DIABETIC POLYNEUROPATHY, WITH LONG-TERM CURRENT USE OF INSULIN (H): ICD-10-CM

## 2019-03-29 DIAGNOSIS — E11.9 TYPE 2 DIABETES MELLITUS WITHOUT COMPLICATION, WITH LONG-TERM CURRENT USE OF INSULIN (H): ICD-10-CM

## 2019-03-29 LAB
ANION GAP SERPL CALCULATED.3IONS-SCNC: 11 MMOL/L (ref 5–18)
BUN SERPL-MCNC: 11 MG/DL (ref 8–22)
CALCIUM SERPL-MCNC: 9.8 MG/DL (ref 8.5–10.5)
CHLORIDE BLD-SCNC: 101 MMOL/L (ref 98–107)
CO2 SERPL-SCNC: 31 MMOL/L (ref 22–31)
CREAT SERPL-MCNC: 0.65 MG/DL (ref 0.6–1.1)
CREAT UR-MCNC: 111.4 MG/DL
GFR SERPL CREATININE-BSD FRML MDRD: >60 ML/MIN/1.73M2
GLUCOSE BLD-MCNC: 97 MG/DL (ref 70–125)
MICROALBUMIN UR-MCNC: 17.62 MG/DL (ref 0–1.99)
MICROALBUMIN/CREAT UR: 158.2 MG/G
POTASSIUM BLD-SCNC: 4.4 MMOL/L (ref 3.5–5)
SODIUM SERPL-SCNC: 143 MMOL/L (ref 136–145)

## 2019-03-29 RX ORDER — FLASH GLUCOSE SENSOR
1 KIT MISCELLANEOUS DAILY
Qty: 1 EACH | Refills: 0 | Status: SHIPPED | OUTPATIENT
Start: 2019-03-29 | End: 2024-07-30 | Stop reason: ALTCHOICE

## 2019-03-29 RX ORDER — LANCETS 30 GAUGE
1 EACH MISCELLANEOUS 4 TIMES DAILY
Qty: 100 EACH | Refills: 3 | Status: SHIPPED | OUTPATIENT
Start: 2019-03-29

## 2019-04-08 ENCOUNTER — RECORDS - HEALTHEAST (OUTPATIENT)
Dept: ADMINISTRATIVE | Facility: OTHER | Age: 64
End: 2019-04-08

## 2019-04-09 ENCOUNTER — RECORDS - HEALTHEAST (OUTPATIENT)
Dept: ADMINISTRATIVE | Facility: OTHER | Age: 64
End: 2019-04-09

## 2019-04-16 ENCOUNTER — RECORDS - HEALTHEAST (OUTPATIENT)
Dept: HEALTH INFORMATION MANAGEMENT | Facility: CLINIC | Age: 64
End: 2019-04-16

## 2019-05-07 ENCOUNTER — COMMUNICATION - HEALTHEAST (OUTPATIENT)
Dept: FAMILY MEDICINE | Facility: CLINIC | Age: 64
End: 2019-05-07

## 2019-05-07 DIAGNOSIS — E78.5 HYPERLIPIDEMIA: ICD-10-CM

## 2019-06-11 ENCOUNTER — RECORDS - HEALTHEAST (OUTPATIENT)
Dept: ADMINISTRATIVE | Facility: OTHER | Age: 64
End: 2019-06-11

## 2019-06-13 ENCOUNTER — COMMUNICATION - HEALTHEAST (OUTPATIENT)
Dept: NURSING | Facility: CLINIC | Age: 64
End: 2019-06-13

## 2019-06-13 DIAGNOSIS — E11.9 TYPE 2 DIABETES MELLITUS (H): ICD-10-CM

## 2019-07-03 ENCOUNTER — OFFICE VISIT - HEALTHEAST (OUTPATIENT)
Dept: FAMILY MEDICINE | Facility: CLINIC | Age: 64
End: 2019-07-03

## 2019-07-03 DIAGNOSIS — E11.9 TYPE 2 DIABETES MELLITUS WITHOUT COMPLICATION, WITH LONG-TERM CURRENT USE OF INSULIN (H): ICD-10-CM

## 2019-07-03 DIAGNOSIS — E87.6 HYPOKALEMIA: ICD-10-CM

## 2019-07-03 DIAGNOSIS — Z79.4 TYPE 2 DIABETES MELLITUS WITHOUT COMPLICATION, WITH LONG-TERM CURRENT USE OF INSULIN (H): ICD-10-CM

## 2019-07-03 LAB
ANION GAP SERPL CALCULATED.3IONS-SCNC: 9 MMOL/L (ref 5–18)
BUN SERPL-MCNC: 16 MG/DL (ref 8–22)
CALCIUM SERPL-MCNC: 9.7 MG/DL (ref 8.5–10.5)
CHLORIDE BLD-SCNC: 102 MMOL/L (ref 98–107)
CO2 SERPL-SCNC: 28 MMOL/L (ref 22–31)
CREAT SERPL-MCNC: 0.7 MG/DL (ref 0.6–1.1)
GFR SERPL CREATININE-BSD FRML MDRD: >60 ML/MIN/1.73M2
GLUCOSE BLD-MCNC: 174 MG/DL (ref 70–125)
HBA1C MFR BLD: 13.8 % (ref 3.5–6)
POTASSIUM BLD-SCNC: 4.7 MMOL/L (ref 3.5–5)
SODIUM SERPL-SCNC: 139 MMOL/L (ref 136–145)

## 2019-07-03 ASSESSMENT — MIFFLIN-ST. JEOR: SCORE: 1133.96

## 2019-10-23 ENCOUNTER — COMMUNICATION - HEALTHEAST (OUTPATIENT)
Dept: FAMILY MEDICINE | Facility: CLINIC | Age: 64
End: 2019-10-23

## 2019-10-23 DIAGNOSIS — E87.6 HYPOKALEMIA: ICD-10-CM

## 2019-12-11 ENCOUNTER — COMMUNICATION - HEALTHEAST (OUTPATIENT)
Dept: FAMILY MEDICINE | Facility: CLINIC | Age: 64
End: 2019-12-11

## 2019-12-11 DIAGNOSIS — Z79.4 TYPE 2 DIABETES MELLITUS WITH DIABETIC POLYNEUROPATHY, WITH LONG-TERM CURRENT USE OF INSULIN (H): ICD-10-CM

## 2019-12-11 DIAGNOSIS — E11.42 TYPE 2 DIABETES MELLITUS WITH DIABETIC POLYNEUROPATHY, WITH LONG-TERM CURRENT USE OF INSULIN (H): ICD-10-CM

## 2020-02-03 ENCOUNTER — COMMUNICATION - HEALTHEAST (OUTPATIENT)
Dept: FAMILY MEDICINE | Facility: CLINIC | Age: 65
End: 2020-02-03

## 2020-02-03 DIAGNOSIS — E78.5 HYPERLIPIDEMIA: ICD-10-CM

## 2020-02-21 ENCOUNTER — COMMUNICATION - HEALTHEAST (OUTPATIENT)
Dept: FAMILY MEDICINE | Facility: CLINIC | Age: 65
End: 2020-02-21

## 2020-02-26 ENCOUNTER — NURSE TRIAGE (OUTPATIENT)
Dept: NURSING | Facility: CLINIC | Age: 65
End: 2020-02-26

## 2020-02-26 ENCOUNTER — OFFICE VISIT - HEALTHEAST (OUTPATIENT)
Dept: FAMILY MEDICINE | Facility: CLINIC | Age: 65
End: 2020-02-26

## 2020-02-26 ENCOUNTER — COMMUNICATION - HEALTHEAST (OUTPATIENT)
Dept: SCHEDULING | Facility: CLINIC | Age: 65
End: 2020-02-26

## 2020-02-26 DIAGNOSIS — E87.6 HYPOKALEMIA: ICD-10-CM

## 2020-02-26 DIAGNOSIS — I27.20 PULMONARY HYPERTENSION, MILD (H): ICD-10-CM

## 2020-02-26 DIAGNOSIS — Z79.4 TYPE 2 DIABETES MELLITUS WITHOUT COMPLICATION, WITH LONG-TERM CURRENT USE OF INSULIN (H): ICD-10-CM

## 2020-02-26 DIAGNOSIS — E11.9 TYPE 2 DIABETES MELLITUS WITHOUT COMPLICATION, WITH LONG-TERM CURRENT USE OF INSULIN (H): ICD-10-CM

## 2020-02-26 DIAGNOSIS — D68.59 PRIMARY HYPERCOAGULABLE STATE (H): ICD-10-CM

## 2020-02-26 LAB
ALBUMIN SERPL-MCNC: 4.3 G/DL (ref 3.5–5)
ALP SERPL-CCNC: 95 U/L (ref 45–120)
ALT SERPL W P-5'-P-CCNC: 33 U/L (ref 0–45)
ANION GAP SERPL CALCULATED.3IONS-SCNC: 12 MMOL/L (ref 5–18)
AST SERPL W P-5'-P-CCNC: 26 U/L (ref 0–40)
BILIRUB SERPL-MCNC: 0.3 MG/DL (ref 0–1)
BUN SERPL-MCNC: 16 MG/DL (ref 8–22)
CALCIUM SERPL-MCNC: 10.2 MG/DL (ref 8.5–10.5)
CHLORIDE BLD-SCNC: 103 MMOL/L (ref 98–107)
CHOLEST SERPL-MCNC: 158 MG/DL
CO2 SERPL-SCNC: 28 MMOL/L (ref 22–31)
CREAT SERPL-MCNC: 0.66 MG/DL (ref 0.6–1.1)
FASTING STATUS PATIENT QL REPORTED: YES
GFR SERPL CREATININE-BSD FRML MDRD: >60 ML/MIN/1.73M2
GLUCOSE BLD-MCNC: 54 MG/DL (ref 70–125)
HBA1C MFR BLD: 9.6 % (ref 3.5–6)
HDLC SERPL-MCNC: 66 MG/DL
LDLC SERPL CALC-MCNC: 81 MG/DL
POTASSIUM BLD-SCNC: 4.9 MMOL/L (ref 3.5–5)
PROT SERPL-MCNC: 6.8 G/DL (ref 6–8)
SODIUM SERPL-SCNC: 143 MMOL/L (ref 136–145)
TRIGL SERPL-MCNC: 53 MG/DL

## 2020-02-26 ASSESSMENT — MIFFLIN-ST. JEOR: SCORE: 1174.78

## 2020-02-26 NOTE — TELEPHONE ENCOUNTER
Alex from [facility]- calls and because of a very poor phone connection, this nurse could not hear Alex. Caller says that she is calling about Dr. Shante Cohen's pt. Alex says that she thinks pt. Is with [facility]. RN then gave Delorafermikala the phone # to the [facility] Nurse Line, for assistance with this call.   Reason for Disposition    [1] Follow-up call to recent contact AND [2] information only call, no triage required    Additional Information    Negative: [1] Caller is not with the adult (patient) AND [2] reporting urgent symptoms    Negative: Lab result questions    Negative: Medication questions    Negative: Caller can't be reached by phone    Negative: Caller has already spoken to PCP or another triager    Negative: RN needs further essential information from caller in order to complete triage    Negative: Requesting regular office appointment    Negative: [1] Caller requesting NON-URGENT health information AND [2] PCP's office is the best resource    Negative: Health Information question, no triage required and triager able to answer question    Negative: General information question, no triage required and triager able to answer question    Negative: Question about upcoming scheduled test, no triage required and triager able to answer question    Negative: [1] Caller is not with the adult (patient) AND [2] probable NON-URGENT symptoms    Protocols used: INFORMATION ONLY CALL-A-

## 2020-03-02 ENCOUNTER — COMMUNICATION - HEALTHEAST (OUTPATIENT)
Dept: FAMILY MEDICINE | Facility: CLINIC | Age: 65
End: 2020-03-02

## 2020-03-04 ENCOUNTER — COMMUNICATION - HEALTHEAST (OUTPATIENT)
Dept: FAMILY MEDICINE | Facility: CLINIC | Age: 65
End: 2020-03-04

## 2020-03-04 DIAGNOSIS — Z79.4 TYPE 2 DIABETES MELLITUS WITHOUT COMPLICATION, WITH LONG-TERM CURRENT USE OF INSULIN (H): ICD-10-CM

## 2020-03-04 DIAGNOSIS — E11.9 TYPE 2 DIABETES MELLITUS WITHOUT COMPLICATION, WITH LONG-TERM CURRENT USE OF INSULIN (H): ICD-10-CM

## 2020-04-01 ENCOUNTER — COMMUNICATION - HEALTHEAST (OUTPATIENT)
Dept: EDUCATION SERVICES | Facility: CLINIC | Age: 65
End: 2020-04-01

## 2020-04-01 DIAGNOSIS — E11.9 TYPE 2 DIABETES MELLITUS WITHOUT COMPLICATION, WITHOUT LONG-TERM CURRENT USE OF INSULIN (H): ICD-10-CM

## 2020-04-15 ENCOUNTER — COMMUNICATION - HEALTHEAST (OUTPATIENT)
Dept: FAMILY MEDICINE | Facility: CLINIC | Age: 65
End: 2020-04-15

## 2020-04-15 DIAGNOSIS — Z79.4 TYPE 2 DIABETES MELLITUS WITH DIABETIC POLYNEUROPATHY, WITH LONG-TERM CURRENT USE OF INSULIN (H): ICD-10-CM

## 2020-04-15 DIAGNOSIS — E11.42 TYPE 2 DIABETES MELLITUS WITH DIABETIC POLYNEUROPATHY, WITH LONG-TERM CURRENT USE OF INSULIN (H): ICD-10-CM

## 2020-05-24 ENCOUNTER — COMMUNICATION - HEALTHEAST (OUTPATIENT)
Dept: FAMILY MEDICINE | Facility: CLINIC | Age: 65
End: 2020-05-24

## 2020-05-24 DIAGNOSIS — Z79.4 TYPE 2 DIABETES MELLITUS WITHOUT COMPLICATION, WITH LONG-TERM CURRENT USE OF INSULIN (H): ICD-10-CM

## 2020-05-24 DIAGNOSIS — E11.9 TYPE 2 DIABETES MELLITUS WITHOUT COMPLICATION, WITH LONG-TERM CURRENT USE OF INSULIN (H): ICD-10-CM

## 2020-05-27 ENCOUNTER — COMMUNICATION - HEALTHEAST (OUTPATIENT)
Dept: FAMILY MEDICINE | Facility: CLINIC | Age: 65
End: 2020-05-27

## 2020-05-27 DIAGNOSIS — E87.6 HYPOKALEMIA: ICD-10-CM

## 2020-06-23 ENCOUNTER — RECORDS - HEALTHEAST (OUTPATIENT)
Dept: ADMINISTRATIVE | Facility: OTHER | Age: 65
End: 2020-06-23

## 2020-06-23 LAB — RETINOPATHY: NEGATIVE

## 2020-06-29 ENCOUNTER — RECORDS - HEALTHEAST (OUTPATIENT)
Dept: HEALTH INFORMATION MANAGEMENT | Facility: CLINIC | Age: 65
End: 2020-06-29

## 2020-09-13 ENCOUNTER — COMMUNICATION - HEALTHEAST (OUTPATIENT)
Dept: FAMILY MEDICINE | Facility: CLINIC | Age: 65
End: 2020-09-13

## 2020-09-13 DIAGNOSIS — Z79.4 TYPE 2 DIABETES MELLITUS WITHOUT COMPLICATION, WITH LONG-TERM CURRENT USE OF INSULIN (H): ICD-10-CM

## 2020-09-13 DIAGNOSIS — E11.9 TYPE 2 DIABETES MELLITUS WITHOUT COMPLICATION, WITH LONG-TERM CURRENT USE OF INSULIN (H): ICD-10-CM

## 2020-09-18 ENCOUNTER — COMMUNICATION - HEALTHEAST (OUTPATIENT)
Dept: FAMILY MEDICINE | Facility: CLINIC | Age: 65
End: 2020-09-18

## 2020-10-14 ENCOUNTER — COMMUNICATION - HEALTHEAST (OUTPATIENT)
Dept: FAMILY MEDICINE | Facility: CLINIC | Age: 65
End: 2020-10-14

## 2020-10-14 DIAGNOSIS — E78.5 HYPERLIPIDEMIA: ICD-10-CM

## 2020-10-22 ENCOUNTER — COMMUNICATION - HEALTHEAST (OUTPATIENT)
Dept: FAMILY MEDICINE | Facility: CLINIC | Age: 65
End: 2020-10-22

## 2020-10-22 DIAGNOSIS — E11.9 TYPE 2 DIABETES MELLITUS WITHOUT COMPLICATION, WITH LONG-TERM CURRENT USE OF INSULIN (H): ICD-10-CM

## 2020-10-22 DIAGNOSIS — Z79.4 TYPE 2 DIABETES MELLITUS WITHOUT COMPLICATION, WITH LONG-TERM CURRENT USE OF INSULIN (H): ICD-10-CM

## 2020-11-18 ENCOUNTER — OFFICE VISIT - HEALTHEAST (OUTPATIENT)
Dept: FAMILY MEDICINE | Facility: CLINIC | Age: 65
End: 2020-11-18

## 2020-11-18 DIAGNOSIS — Z78.0 ASYMPTOMATIC POSTMENOPAUSAL STATUS: ICD-10-CM

## 2020-11-18 DIAGNOSIS — Z79.4 TYPE 2 DIABETES MELLITUS WITH DIABETIC POLYNEUROPATHY, WITH LONG-TERM CURRENT USE OF INSULIN (H): ICD-10-CM

## 2020-11-18 DIAGNOSIS — E11.42 TYPE 2 DIABETES MELLITUS WITH DIABETIC POLYNEUROPATHY, WITH LONG-TERM CURRENT USE OF INSULIN (H): ICD-10-CM

## 2020-11-18 DIAGNOSIS — I36.1 TRICUSPID VALVE REGURGITATION, NONRHEUMATIC: ICD-10-CM

## 2020-11-18 DIAGNOSIS — I27.20 PULMONARY HYPERTENSION, MILD (H): ICD-10-CM

## 2020-11-18 LAB
CREAT UR-MCNC: 57.8 MG/DL
HBA1C MFR BLD: 10.4 %
MICROALBUMIN UR-MCNC: <0.5 MG/DL (ref 0–1.99)
MICROALBUMIN/CREAT UR: NORMAL MG/G{CREAT}

## 2020-11-18 ASSESSMENT — MIFFLIN-ST. JEOR: SCORE: 1221.28

## 2020-11-24 ENCOUNTER — AMBULATORY - HEALTHEAST (OUTPATIENT)
Dept: EDUCATION SERVICES | Facility: CLINIC | Age: 65
End: 2020-11-24

## 2020-11-24 DIAGNOSIS — Z79.4 TYPE 2 DIABETES MELLITUS WITH DIABETIC POLYNEUROPATHY, WITH LONG-TERM CURRENT USE OF INSULIN (H): ICD-10-CM

## 2020-11-24 DIAGNOSIS — E11.42 TYPE 2 DIABETES MELLITUS WITH DIABETIC POLYNEUROPATHY, WITH LONG-TERM CURRENT USE OF INSULIN (H): ICD-10-CM

## 2020-12-01 ENCOUNTER — HOSPITAL ENCOUNTER (OUTPATIENT)
Dept: CARDIOLOGY | Facility: CLINIC | Age: 65
Discharge: HOME | End: 2020-12-01
Attending: FAMILY MEDICINE

## 2020-12-01 DIAGNOSIS — I36.1 TRICUSPID VALVE REGURGITATION, NONRHEUMATIC: ICD-10-CM

## 2020-12-01 LAB
AORTIC ROOT: 3.1 CM
BSA FOR ECHO PROCEDURE: 1.78 M2
CV BLOOD PRESSURE: ABNORMAL MMHG
CV ECHO HEIGHT: 63 IN
CV ECHO WEIGHT: 157 LBS
DOP CALC LVOT AREA: 2.27 CM2
DOP CALC LVOT DIAMETER: 1.7 CM
DOP CALC LVOT PEAK VEL: 79.7 CM/S
DOP CALC LVOT STROKE VOLUME: 44.5 CM3
DOP CALCLVOT PEAK VEL VTI: 19.6 CM
EJECTION FRACTION: 57 % (ref 55–75)
FRACTIONAL SHORTENING: 30 % (ref 28–44)
INTERVENTRICULAR SEPTUM IN END DIASTOLE: 0.95 CM (ref 0.6–0.9)
IVS/PW RATIO: 1
LA AREA 1: 16.8 CM2
LA AREA 2: 14 CM2
LEFT ATRIUM LENGTH: 4.51 CM
LEFT ATRIUM SIZE: 3.3 CM
LEFT ATRIUM TO AORTIC ROOT RATIO: 1.06 NO UNITS
LEFT ATRIUM VOLUME INDEX: 24.9 ML/M2
LEFT ATRIUM VOLUME: 44.3 ML
LEFT VENTRICLE DIASTOLIC VOLUME INDEX: 26.4 CM3/M2 (ref 29–61)
LEFT VENTRICLE DIASTOLIC VOLUME: 47 CM3 (ref 46–106)
LEFT VENTRICLE MASS INDEX: 61.2 G/M2
LEFT VENTRICLE SYSTOLIC VOLUME INDEX: 11.2 CM3/M2 (ref 8–24)
LEFT VENTRICLE SYSTOLIC VOLUME: 20 CM3 (ref 14–42)
LEFT VENTRICULAR INTERNAL DIMENSION IN DIASTOLE: 3.8 CM (ref 3.8–5.2)
LEFT VENTRICULAR INTERNAL DIMENSION IN SYSTOLE: 2.66 CM (ref 2.2–3.5)
LEFT VENTRICULAR MASS: 108.9 G
LEFT VENTRICULAR OUTFLOW TRACT MEAN GRADIENT: 2 MMHG
LEFT VENTRICULAR OUTFLOW TRACT MEAN VELOCITY: 60.1 CM/S
LEFT VENTRICULAR OUTFLOW TRACT PEAK GRADIENT: 3 MMHG
LEFT VENTRICULAR POSTERIOR WALL IN END DIASTOLE: 0.95 CM (ref 0.6–0.9)
LV STROKE VOLUME INDEX: 25 ML/M2
MITRAL VALVE E/A RATIO: 1.1
MV AVERAGE E/E' RATIO: 9.2 CM/S
MV DECELERATION TIME: 218 MS
MV E'TISSUE VEL-LAT: 8.68 CM/S
MV E'TISSUE VEL-MED: 8.58 CM/S
MV LATERAL E/E' RATIO: 9.1
MV MEDIAL E/E' RATIO: 9.2
MV PEAK A VELOCITY: 74.5 CM/S
MV PEAK E VELOCITY: 79 CM/S
NUC REST DIASTOLIC VOLUME INDEX: 2512 LBS
NUC REST SYSTOLIC VOLUME INDEX: 63 IN
TRICUSPID REGURGITATION PEAK PRESSURE GRADIENT: 24.6 MMHG
TRICUSPID VALVE ANULAR PLANE SYSTOLIC EXCURSION: 2 CM
TRICUSPID VALVE PEAK REGURGITANT VELOCITY: 248 CM/S

## 2020-12-01 ASSESSMENT — MIFFLIN-ST. JEOR: SCORE: 1221.28

## 2020-12-08 ENCOUNTER — AMBULATORY - HEALTHEAST (OUTPATIENT)
Dept: EDUCATION SERVICES | Facility: CLINIC | Age: 65
End: 2020-12-08

## 2020-12-08 DIAGNOSIS — E11.42 TYPE 2 DIABETES MELLITUS WITH DIABETIC POLYNEUROPATHY, WITH LONG-TERM CURRENT USE OF INSULIN (H): ICD-10-CM

## 2020-12-08 DIAGNOSIS — Z79.4 TYPE 2 DIABETES MELLITUS WITH DIABETIC POLYNEUROPATHY, WITH LONG-TERM CURRENT USE OF INSULIN (H): ICD-10-CM

## 2020-12-17 ENCOUNTER — COMMUNICATION - HEALTHEAST (OUTPATIENT)
Dept: FAMILY MEDICINE | Facility: CLINIC | Age: 65
End: 2020-12-17

## 2020-12-17 DIAGNOSIS — E87.6 HYPOKALEMIA: ICD-10-CM

## 2020-12-20 RX ORDER — POTASSIUM CHLORIDE 1500 MG/1
20 TABLET, EXTENDED RELEASE ORAL 2 TIMES DAILY
Qty: 180 TABLET | Refills: 3 | Status: SHIPPED | OUTPATIENT
Start: 2020-12-20 | End: 2021-12-29

## 2020-12-21 ENCOUNTER — COMMUNICATION - HEALTHEAST (OUTPATIENT)
Dept: PHARMACY | Facility: CLINIC | Age: 65
End: 2020-12-21

## 2020-12-21 DIAGNOSIS — E11.9 TYPE 2 DIABETES MELLITUS WITHOUT COMPLICATION, WITH LONG-TERM CURRENT USE OF INSULIN (H): ICD-10-CM

## 2020-12-21 DIAGNOSIS — Z79.4 TYPE 2 DIABETES MELLITUS WITHOUT COMPLICATION, WITH LONG-TERM CURRENT USE OF INSULIN (H): ICD-10-CM

## 2020-12-30 ENCOUNTER — RECORDS - HEALTHEAST (OUTPATIENT)
Dept: BONE DENSITY | Facility: CLINIC | Age: 65
End: 2020-12-30

## 2020-12-30 ENCOUNTER — RECORDS - HEALTHEAST (OUTPATIENT)
Dept: ADMINISTRATIVE | Facility: OTHER | Age: 65
End: 2020-12-30

## 2020-12-30 DIAGNOSIS — Z78.0 ASYMPTOMATIC MENOPAUSAL STATE: ICD-10-CM

## 2021-01-18 ENCOUNTER — OFFICE VISIT - HEALTHEAST (OUTPATIENT)
Dept: FAMILY MEDICINE | Facility: CLINIC | Age: 66
End: 2021-01-18

## 2021-01-18 DIAGNOSIS — E11.42 TYPE 2 DIABETES MELLITUS WITH DIABETIC POLYNEUROPATHY, WITH LONG-TERM CURRENT USE OF INSULIN (H): ICD-10-CM

## 2021-01-18 DIAGNOSIS — Z12.31 VISIT FOR SCREENING MAMMOGRAM: ICD-10-CM

## 2021-01-18 DIAGNOSIS — Z79.4 TYPE 2 DIABETES MELLITUS WITH DIABETIC POLYNEUROPATHY, WITH LONG-TERM CURRENT USE OF INSULIN (H): ICD-10-CM

## 2021-01-18 ASSESSMENT — MIFFLIN-ST. JEOR: SCORE: 1234.89

## 2021-02-24 ENCOUNTER — OFFICE VISIT - HEALTHEAST (OUTPATIENT)
Dept: FAMILY MEDICINE | Facility: CLINIC | Age: 66
End: 2021-02-24

## 2021-02-24 DIAGNOSIS — I27.20 PULMONARY HYPERTENSION, MILD (H): ICD-10-CM

## 2021-02-24 DIAGNOSIS — E11.42 TYPE 2 DIABETES MELLITUS WITH DIABETIC POLYNEUROPATHY, WITH LONG-TERM CURRENT USE OF INSULIN (H): ICD-10-CM

## 2021-02-24 DIAGNOSIS — Z79.4 TYPE 2 DIABETES MELLITUS WITH DIABETIC POLYNEUROPATHY, WITH LONG-TERM CURRENT USE OF INSULIN (H): ICD-10-CM

## 2021-02-24 LAB
ALBUMIN SERPL-MCNC: 4 G/DL (ref 3.5–5)
ALP SERPL-CCNC: 90 U/L (ref 45–120)
ALT SERPL W P-5'-P-CCNC: 23 U/L (ref 0–45)
ANION GAP SERPL CALCULATED.3IONS-SCNC: 9 MMOL/L (ref 5–18)
AST SERPL W P-5'-P-CCNC: 19 U/L (ref 0–40)
BILIRUB SERPL-MCNC: 0.3 MG/DL (ref 0–1)
BUN SERPL-MCNC: 22 MG/DL (ref 8–22)
CALCIUM SERPL-MCNC: 9.9 MG/DL (ref 8.5–10.5)
CHLORIDE BLD-SCNC: 104 MMOL/L (ref 98–107)
CHOLEST SERPL-MCNC: 140 MG/DL
CO2 SERPL-SCNC: 30 MMOL/L (ref 22–31)
CREAT SERPL-MCNC: 0.66 MG/DL (ref 0.6–1.1)
FASTING STATUS PATIENT QL REPORTED: YES
GFR SERPL CREATININE-BSD FRML MDRD: >60 ML/MIN/1.73M2
GLUCOSE BLD-MCNC: 75 MG/DL (ref 70–125)
HBA1C MFR BLD: 10 %
HDLC SERPL-MCNC: 65 MG/DL
LDLC SERPL CALC-MCNC: 61 MG/DL
POTASSIUM BLD-SCNC: 4.2 MMOL/L (ref 3.5–5)
PROT SERPL-MCNC: 6.5 G/DL (ref 6–8)
SODIUM SERPL-SCNC: 143 MMOL/L (ref 136–145)
TRIGL SERPL-MCNC: 71 MG/DL

## 2021-02-24 RX ORDER — INSULIN DEGLUDEC 100 U/ML
10 INJECTION, SOLUTION SUBCUTANEOUS DAILY
Qty: 15 ML | Refills: 1 | Status: SHIPPED
Start: 2021-02-24 | End: 2021-07-07

## 2021-02-24 RX ORDER — FLUCONAZOLE 150 MG/1
TABLET ORAL
Qty: 2 TABLET | Refills: 1 | Status: SHIPPED | OUTPATIENT
Start: 2021-02-24 | End: 2021-10-12

## 2021-02-24 ASSESSMENT — MIFFLIN-ST. JEOR: SCORE: 1221.28

## 2021-03-08 ENCOUNTER — HOSPITAL ENCOUNTER (OUTPATIENT)
Dept: MAMMOGRAPHY | Facility: CLINIC | Age: 66
Discharge: HOME | End: 2021-03-08
Attending: FAMILY MEDICINE

## 2021-03-08 DIAGNOSIS — Z12.31 VISIT FOR SCREENING MAMMOGRAM: ICD-10-CM

## 2021-04-20 ENCOUNTER — COMMUNICATION - HEALTHEAST (OUTPATIENT)
Dept: FAMILY MEDICINE | Facility: CLINIC | Age: 66
End: 2021-04-20

## 2021-04-20 DIAGNOSIS — E78.5 HYPERLIPIDEMIA: ICD-10-CM

## 2021-04-20 RX ORDER — ATORVASTATIN CALCIUM 10 MG/1
TABLET, FILM COATED ORAL
Qty: 90 TABLET | Refills: 3 | Status: SHIPPED | OUTPATIENT
Start: 2021-04-20 | End: 2022-04-17

## 2021-05-25 ENCOUNTER — RECORDS - HEALTHEAST (OUTPATIENT)
Dept: ADMINISTRATIVE | Facility: CLINIC | Age: 66
End: 2021-05-25

## 2021-05-27 NOTE — TELEPHONE ENCOUNTER
RN cannot approve Refill Request    RN can NOT refill this medication Refill too soon  .         Last office visit: 2/21/2019 Shante Cohen MD Last Physical: 12/11/2017 Last MTM visit: Visit date not found Last visit same specialty: 2/21/2019 Shante Cohen MD.  Next visit within 3 mo: Visit date not found  Next physical within 3 mo: Visit date not found      Ced Agarwal, Care Connection Triage/Med Refill 3/31/2019    Requested Prescriptions   Signed Prescriptions Disp Refills     KLOR-CON M20 20 mEq tablet 180 tablet 3     Sig: TAKE 1 TABLET (20 MEQ TOTAL) BY MOUTH 2 (TWO) TIMES A DAY.    Potassium Supplements Refill Protocol Passed - 3/31/2019  7:25 AM       Passed - PCP or prescribing provider visit in past 12 months      Last office visit with prescriber/PCP: 2/21/2019 Shante Cohen MD OR same dept: 2/21/2019 Shante Cohen MD OR same specialty: 2/21/2019 Shante Cohen MD  Last physical: 12/11/2017 Last MTM visit: Visit date not found   Next visit within 3 mo: Visit date not found  Next physical within 3 mo: Visit date not found  Prescriber OR PCP: Shante Cohen MD  Last diagnosis associated with med order: 1. Hypokalemia  - KLOR-CON M20 20 mEq tablet; TAKE 1 TABLET (20 MEQ TOTAL) BY MOUTH 2 (TWO) TIMES A DAY.  Dispense: 180 tablet; Refill: 3    If protocol passes may refill for 12 months if within 3 months of last provider visit (or a total of 15 months).            Passed - Potassium level in last 12 months    Lab Results   Component Value Date    Potassium 4.4 03/29/2019

## 2021-05-27 NOTE — PROGRESS NOTES
"Assessment: Mishel has had type 2 diabetes for 10 years.   She is taking 14 units Tresiba at bedtime and 1000 mg metformin bid.  Recent Alc 13.7%.   Mishel retired in 2019 and communicates her interest in taking better care of herself.  She has made changes to her diet, less chips & sweets,  now more vegetables and regular meals.  She reports \"food \" is her biggest challenge.   She is walking 30 minutes 7 days per week.  She has been checking FBS, 100-130 mg/dl range, however, she has been using  strips.  Her BG today was 148 mg/dl after banana.  She is asking about the Black[employer] Systemssstyle abran continuous sensor.   She does not have any symptoms of hypoglycemia, but reports \"I never have\".     Plan:  Called pharmacy to make sure she has supplies she needs,  set up with Verio glucose meter and strips.   Also ordered Freestyle Abran for personal use,  her cost:  $35.00 for the reader and the sensors are $19.00 each.  Mishel thought the Freestyle would be very good option for her.   Discussed features of continuous glucose monitoring.    Reveiwed nutrition guidelines and healty carb choices, she is very familiar with recommendations.   Reviewed signs/treatment for hypoglycemia. Discussed diabetes ABC's, foot care, dental, skin, and eye care.         To return 3-4 weeks for Freestyle sensor download.     Subjective and Objective:      Mishel Garza is referred by Dr. Heredia for Diabetes Education.     Lab Results   Component Value Date    HGBA1C 13.7 (H) 2019         Goals       I want to take steps to lower my A1C. (pt-stated)      Action steps to achieve this goal  1.  I will test my blood sugars four times a day. I have not been checking my blood sugars. But will be working to get back on track with this.  My next Clinic Care Coordination/Health Care Home in clinic follow up will be 7/3/17 @ 4:30.  2.  I will work to get my blood sugars when fasting from  and after meals below 180. I am not sure " "how my blood sugars have been lately because of not checking my blood sugars.  My next Clinic Care Coordination/Health Care Home in clinic follow up will be 7/3/17 @ 4:30.  3.  I will record my blood sugars daily in my \"Diabetes Record Book\". I have not been checking my blood sugars. But will be working to get back on track with this.  My next Clinic Care Coordination/Health Care Home in clinic follow up will be 7/3/17 @ 4:30.  4.  I will eat an appropriate Diabetic Diet and record my foods on a daily food log. I have not been eating well lately.  My next Clinic Care Coordination/Health Care Home in clinic follow up will be 7/3/17 @ 4:30.  5.  I will take my Diabetes medications as prescribed. I have been doing good staying consistent with this action step.My Care Guide helped me make an appointment with the Diabetic Educator for 17 @ 8:00.  My next Clinic Care Coordination/Health Care Home in clinic follow up will be 7/3/17 @ 4:30.  6.  I will continue to exercise at home daily, walking for 20 minutes daily on my Tread mill; then also weights and leg exercises 3 times a week for 30 min each time. I have not stayed that consistent with this action step, but will be working to get back on track.  My next Clinic Care Coordination/Health Care Home in clinic follow up will be 7/3/17 @ 4:30.  7.  I will work to start walking at work daily, walking for 10 minutes in the morning; then walking 2-3 times a week in the afternoon for 10 minutes each time. I have not stayed that consistent with this action step but will be working to get back on track.  My next Clinic Care Coordination/Health Care Home in clinic follow up will be 7/3/17 @ 4:30.    Goal Updated: 2017  Date goal set:  2015            Follow up:   Diabetes classes for 3-4 weeks.      Education:     Monitoring   Meter (per above goals): assessment, discussed, pt returned demonstration,  using  strips.  Had 3 different meters and 3 " boxes of  srips.  Monitoring: assessment, discussed, pt returned demonstration, literature provided   BG goals: assessment, discussed, pt returned demonstration, literature provided      Nutrition Management:  assessment, discussed, pt returned demo,  literature provided   Weight:assessment, discussed, pt returned demo,  literature provided   Portions/Balance: assessment, discussed, pt returned demo,  literature provided   Carb ID/Count: assessment, discussed, pt returned demo,  literature provided   Label Reading: assessment, discussed, pt returned demo,  literature provided   Heart Healthy Fats:assessment, discussed, pt returned demo,  literature provided   Menu Planning: assessment, discussed, pt returned demo,  literature provided   Dining Out: assessment, discussed,  literature provided   Physical Activity: assessment, discussed,  literature provided   Medications: assessment, discussed  Orals: assessment, discussed  Injected Medications: Discussed and Literature provided   Storage/Exp:Discussed   Site Rotation: Discussed       Diabetes Disease Process: Discussed    Acute Complications: Prevent, Detect, Treat:  Hypoglycemia: Discussed, Literature provided and Patient returned demonstration  Hyperglycemia: Discussed, Literature provided and Patient returned demonstration  Sick Days: Discussed and Literature provided    Chronic Complications  Foot Care: literature provided  Skin Care: Discussed and Literature provided  Eye: Discussed, Literature provided and Patient returned demonstration  ABC: Discussed, Literature provided and Patient returned demonstration  Teeth:Discussed  Goal Setting and Problem Solving: Discussed  Barriers: Discussed  Psychosocial Adjustments: Discussed      Time spent with the patient: 60 minutes for diabetes education and counseling.   Previous Education: yes  Visit Type:DSMPADMAJA Puri  3/29/2019

## 2021-05-28 NOTE — TELEPHONE ENCOUNTER
Refill Approved    Rx renewed per Medication Renewal Policy. Medication was last renewed on 2/10/19.    Robyn Esqueda, [facility] Triage/Med Refill 5/7/2019     Requested Prescriptions   Pending Prescriptions Disp Refills     atorvastatin (LIPITOR) 10 MG tablet [Pharmacy Med Name: ATORVASTATIN 10 MG TABLET] 90 tablet 0     Sig: TAKE 1 TABLET BY MOUTH EVERY DAY       Statins Refill Protocol (Hmg CoA Reductase Inhibitors) Passed - 5/7/2019  9:59 AM        Passed - PCP or prescribing provider visit in past 12 months      Last office visit with prescriber/PCP: 2/21/2019 Shante Cohen MD OR same dept: 2/21/2019 Shante Cohen MD OR same specialty: 2/21/2019 Shante Cohen MD  Last physical: 12/11/2017 Last MTM visit: Visit date not found   Next visit within 3 mo: Visit date not found  Next physical within 3 mo: Visit date not found  Prescriber OR PCP: Shante Cohen MD  Last diagnosis associated with med order: 1. Hyperlipidemia  - atorvastatin (LIPITOR) 10 MG tablet [Pharmacy Med Name: ATORVASTATIN 10 MG TABLET]; TAKE 1 TABLET BY MOUTH EVERY DAY  Dispense: 90 tablet; Refill: 0    If protocol passes may refill for 12 months if within 3 months of last provider visit (or a total of 15 months).

## 2021-05-29 NOTE — TELEPHONE ENCOUNTER
"Refill Approved    Rx renewed per Medication Renewal Policy. Medication was last renewed on 4/8/2018 for 100/11.  Last OV 6/13/19    Renetta Dunne, [facility] Triage/Med Refill 6/14/2019     Requested Prescriptions   Pending Prescriptions Disp Refills     pen needle, diabetic (BD ULTRA-FINE PRINCESS PEN NEEDLE) 32 gauge x 5/32\" Ndle [Pharmacy Med Name: BD UF PRINCESS PEN NEEDLE [id number]]  3     Sig: USE TO INJECT TRESIBA ONCE DAILY       Diabetic Supplies Refill Protocol Passed - 6/13/2019 11:20 AM        Passed - Visit with PCP or prescribing provider visit in last 6 months     Last office visit with prescriber/PCP: 2/21/2019 Shante Cohen MD OR same dept: Visit date not found OR same specialty: Visit date not found  Last physical: 12/11/2017 Last MTM visit: 2/2/2017 Elizabeth Underwood, PharmD   Next visit within 3 mo: Visit date not found  Next physical within 3 mo: Visit date not found  Prescriber OR PCP: Shante Cohen MD  Last diagnosis associated with med order: There are no diagnoses linked to this encounter.  If protocol passes may refill for 12 months if within 3 months of last provider visit (or a total of 15 months).             Passed - A1C in last 6 months     Hemoglobin A1c   Date Value Ref Range Status   02/21/2019 13.7 (H) 3.5 - 6.0 % Final                           "

## 2021-05-30 VITALS — WEIGHT: 145 LBS | BODY MASS INDEX: 25.69 KG/M2 | HEIGHT: 63 IN

## 2021-05-30 VITALS — BODY MASS INDEX: 19.25 KG/M2 | WEIGHT: 110.4 LBS

## 2021-05-30 VITALS — WEIGHT: 108.56 LBS | BODY MASS INDEX: 18.93 KG/M2

## 2021-05-30 VITALS — BODY MASS INDEX: 20.66 KG/M2 | WEIGHT: 121 LBS | HEIGHT: 64 IN

## 2021-05-30 VITALS — HEIGHT: 64 IN | BODY MASS INDEX: 18.44 KG/M2 | WEIGHT: 108 LBS

## 2021-05-30 VITALS — HEIGHT: 63 IN | WEIGHT: 129 LBS | BODY MASS INDEX: 22.86 KG/M2

## 2021-05-30 VITALS — BODY MASS INDEX: 25.69 KG/M2 | WEIGHT: 145 LBS | HEIGHT: 63 IN

## 2021-05-30 VITALS — BODY MASS INDEX: 28 KG/M2 | HEIGHT: 63 IN | WEIGHT: 158 LBS

## 2021-05-30 VITALS — HEIGHT: 63 IN | WEIGHT: 135 LBS | BODY MASS INDEX: 23.92 KG/M2

## 2021-05-30 VITALS — WEIGHT: 135 LBS | HEIGHT: 63 IN | BODY MASS INDEX: 23.92 KG/M2

## 2021-05-30 NOTE — PROGRESS NOTES
"  Assessment/Plan:     1. Type 2 diabetes mellitus without complication, with long-term current use of insulin (H)  Very poor control, but when checking her sugars and was having adequate control on Tresiba at current dose.  Encouraged her to begin checking her sugars again and let me know if fasting blood sugars are higher than they have been previously.  I suspect she will require mealtime insulin.  Advised her to begin checking her sugars before and after meals so that she has added information when she meets with the diabetes educator as scheduled.  Encouraged her to schedule follow-up eye visit.  Talk with me in 3 months.  - Glycosylated Hemoglobin A1c    2. Hypokalemia  We will obtain follow-up basic metabolic panel today.  - Basic Metabolic Panel      There are no Patient Instructions on file for this visit.     Return in about 3 months (around 10/3/2019) for Diabetes.      Subjective:      Mishel Garza is a 64 y.o. female presented to clinic today for follow-up of type 2 diabetes, taking Tresiba 16 units at bedtime along with metformin.  Compliant with this.  She has not checked her sugars in several weeks.  Prior to that her blood sugars have been running in the 70s to 110s, occasionally lower.  She is not otherwise having symptoms of low sugar, has not typically had them.  States that she \"loves to eat\", monitoring carbohydrates usually at breakfast but not at other meals.  Finds that she is snacking a lot more on weekends.  She is been less active on the treadmill.  She had a colonoscopy that required a repeat test, feels like that through her off her routine schedule.  She has an appointment scheduled with diabetes education for follow-up.  History of neuropathy, she has tingling but no pain and feels that she still has decent sensation.  She remains on potassium 40 mEq twice daily due to prior hypokalemia.  For follow-up of this.    Current Outpatient Medications   Medication Sig Dispense Refill " "    aspirin 325 MG tablet Take 325 mg by mouth daily.       atorvastatin (LIPITOR) 10 MG tablet TAKE 1 TABLET BY MOUTH EVERY DAY 90 tablet 2     blood glucose test (CONTOUR NEXT TEST STRIPS) strips Use to test blood sugars four times daily. Dispense brand per patient's insurance at pharmacy discretion. 100 strip 11     blood glucose test strips Use 1 each As Directed 4 (four) times a day. 100 strip 3     flash glucose scanning reader (FREESTYLE ELLYN 14 DAY READER) Misc Use 1 Units As Directed daily. Use reader to scan glucose  sensor every 8 hours 1 each 0     generic lancets Accu-Chek Multiclix Lancets Miscellaneous  Test BG 4x daily 100 each 11     insulin degludec (TRESIBA FLEXTOUCH U-100) 100 unit/mL (3 mL) InPn Inject 14 Units under the skin daily. Increase by 2 units every 3 days as needed until sugars at goal or until 18 units. 15 Syringe 4     lancets (ONETOUCH DELICA LANCETS) 33 gauge Misc 1 each by In Vitro route 4 (four) times a day. 100 each 3     metFORMIN (GLUCOPHAGE) 500 MG tablet TAKE 2 TABS BY MOUTH TWICE DAILY WITH FOOD. 360 tablet 3     MULTIVIT-MIN/IRON/FOLIC/LUTEIN (CENTRUM SILVER WOMEN ORAL) Take by mouth daily.       multivitamin (MULTIPLE VITAMINS ORAL) Take by mouth. Centrum silver       pen needle, diabetic (BD ULTRA-FINE PRINCESS PEN NEEDLE) 32 gauge x 5/32\" Ndle USE TO INJECT TRESIBA ONCE DAILY 100 each 11     potassium chloride (K-DUR,KLOR-CON) 20 MEQ tablet Take 2 tablets (40 mEq total) by mouth 2 (two) times a day. 360 tablet 1     potassium chloride (KLOR-CON M20 ORAL)        No current facility-administered medications for this visit.        Past Medical History, Family History, and Social History reviewed.  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 No pertinent surgical history.  Patient has no known allergies.  Family History   Problem Relation Age of Onset     Factor V Leiden deficiency Mother      Social History     Socioeconomic History     Marital status:      Spouse name: Not on file     Number of children: Not on file     Years of education: Not on file     Highest education level: Not on file   Occupational History     Not on file   Social Needs     Financial resource strain: Not on file     Food insecurity:     Worry: Not on file     Inability: Not on file     Transportation needs:     Medical: Not on file     Non-medical: Not on file   Tobacco Use     Smoking status: Never Smoker     Smokeless tobacco: Never Used   Substance and Sexual Activity     Alcohol use: No     Drug use: No     Sexual activity: Not on file   Lifestyle     Physical activity:     Days per week: Not on file     Minutes per session: Not on file     Stress: Not on file   Relationships     Social connections:     Talks on phone: Not on file     Gets together: Not on file     Attends Alevism service: Not on file     Active member of club or organization: Not on file     Attends meetings of clubs or organizations: Not on file     Relationship status: Not on file     Intimate partner violence:     Fear of current or ex partner: Not on file     Emotionally abused: Not on file     Physically abused: Not on file     Forced sexual activity: Not on file   Other Topics Concern     Not on file   Social History Narrative     Not on file         Review of systems is as stated in HPI, and the remainder of the 10 system review is otherwise negative.    Objective:     Vitals:    07/03/19 1007   BP: 122/70   Pulse: 77   Resp: 20   Temp: 97.9  F (36.6  C)   TempSrc: Oral   SpO2: 98%   Weight: 139 lb 8 oz (63.3 kg)   Height: 5' 2.5\" (1.588 m)    Body mass index is " 25.11 kg/m .    Female.  Mucous memories moist.  Heart with regular rate and rhythm.  Lungs clear.  Normal foot exam including normal sensation to monofilament.      This note has been dictated using voice recognition software. Any grammatical or context distortions are unintentional and inherent to the the software.

## 2021-05-31 ENCOUNTER — RECORDS - HEALTHEAST (OUTPATIENT)
Dept: ADMINISTRATIVE | Facility: CLINIC | Age: 66
End: 2021-05-31

## 2021-05-31 VITALS — BODY MASS INDEX: 18.61 KG/M2 | WEIGHT: 109 LBS | HEIGHT: 64 IN

## 2021-05-31 VITALS — BODY MASS INDEX: 20.32 KG/M2 | HEIGHT: 63 IN | WEIGHT: 114.7 LBS

## 2021-06-02 VITALS — WEIGHT: 138 LBS | HEIGHT: 63 IN | BODY MASS INDEX: 24.45 KG/M2

## 2021-06-02 NOTE — TELEPHONE ENCOUNTER
Refill Approved    Rx renewed per Medication Renewal Policy. Medication was last renewed on 3/31/2019       Ced Agarwal, [facility] [place] Connection Triage/Med Refill 10/23/2019     Requested Prescriptions   Pending Prescriptions Disp Refills     KLOR-CON M20 20 mEq tablet [Pharmacy Med Name: KLOR-CON M20 TABLET] 360 tablet 1     Sig: TAKE 2 TABLETS BY MOUTH 2 TIMES A DAY       Potassium Supplements Refill Protocol Passed - 10/23/2019  1:31 AM        Passed - PCP or prescribing provider visit in past 12 months       Last office visit with prescriber/PCP: 7/3/2019 Shante Cohen MD OR same dept: 7/3/2019 Shante Cohen MD OR same specialty: 7/3/2019 Shante Cohen MD  Last physical: 12/11/2017 Last MTM visit: Visit date not found   Next visit within 3 mo: Visit date not found  Next physical within 3 mo: Visit date not found  Prescriber OR PCP: Shante Cohen MD  Last diagnosis associated with med order: 1. Hypokalemia  - KLOR-CON M20 20 mEq tablet [Pharmacy Med Name: KLOR-CON M20 TABLET]; TAKE 2 TABLETS BY MOUTH 2 TIMES A DAY  Dispense: 360 tablet; Refill: 1    If protocol passes may refill for 12 months if within 3 months of last provider visit (or a total of 15 months).             Passed - Potassium level in last 12 months     Lab Results   Component Value Date    Potassium 4.7 07/03/2019

## 2021-06-03 VITALS — WEIGHT: 139.5 LBS | HEIGHT: 63 IN | BODY MASS INDEX: 24.72 KG/M2

## 2021-06-04 VITALS
TEMPERATURE: 97.8 F | RESPIRATION RATE: 16 BRPM | HEIGHT: 63 IN | DIASTOLIC BLOOD PRESSURE: 72 MMHG | BODY MASS INDEX: 26.31 KG/M2 | HEART RATE: 78 BPM | OXYGEN SATURATION: 99 % | SYSTOLIC BLOOD PRESSURE: 120 MMHG | WEIGHT: 148.5 LBS

## 2021-06-04 VITALS
TEMPERATURE: 98.3 F | WEIGHT: 157 LBS | DIASTOLIC BLOOD PRESSURE: 76 MMHG | HEIGHT: 63 IN | RESPIRATION RATE: 16 BRPM | OXYGEN SATURATION: 97 % | BODY MASS INDEX: 27.82 KG/M2 | HEART RATE: 81 BPM | SYSTOLIC BLOOD PRESSURE: 128 MMHG

## 2021-06-04 NOTE — TELEPHONE ENCOUNTER
Refill Approved    Rx renewed per Medication Renewal Policy. Medication was last renewed on 2/21/2019.  Last OV 7/3/2019.    Rufina Mann, [facility] Connection Triage/Med Refill 12/12/2019     Requested Prescriptions   Pending Prescriptions Disp Refills     TRESIBA FLEXTOUCH U-100 100 unit/mL (3 mL) InPn [Pharmacy Med Name: TRESIBA FLEXTOUCH 100 UNIT/ML] 15 Syringe 4     Sig: INJECT 14 UNITS UNDER SKIN DAILY.INCREASE BY 2 UNITS EVERY 3 DS AS NEEDED UNTIL AT GOAL OR 18 UNITS       Insulin/GLP-1 Refill Protocol Passed - 12/11/2019  3:54 AM        Passed - Visit with PCP or prescribing provider visit in last 6 months     Last office visit with prescriber/PCP: 7/3/2019 OR same dept: 7/3/2019 Shante Cohen MD OR same specialty: 7/3/2019 Shante Cohen MD Last physical: Visit date not found Last MTM visit: Visit date not found     Next appt within 3 mo: Visit date not found  Next physical within 3 mo: Visit date not found  Prescriber OR PCP: Shante Cohen MD  Last diagnosis associated with med order: 1. Type 2 diabetes mellitus with diabetic polyneuropathy, with long-term current use of insulin (H)  - TRESIBA FLEXTOUCH U-100 100 unit/mL (3 mL) InPn [Pharmacy Med Name: TRESIBA FLEXTOUCH 100 UNIT/ML]; INJECT 14 UNITS UNDER SKIN DAILY.INCREASE BY 2 UNITS EVERY 3 DS AS NEEDED UNTIL AT GOAL OR 18 UNITS  Dispense: 15 Syringe; Refill: 4    If protocol passes may refill for 6 months if within 3 months of last provider visit (or a total of 9 months).              Passed - A1C in last 6 months     Hemoglobin A1c   Date Value Ref Range Status   07/03/2019 13.8 (H) 3.5 - 6.0 % Final               Passed - Microalbumin in last year     Microalbumin, Random Urine   Date Value Ref Range Status   03/29/2019 17.62 (H) 0.00 - 1.99 mg/dL Final                  Passed - Blood pressure in last year     BP Readings from Last 1 Encounters:   07/03/19 122/70             Passed - Creatinine done in last year     Creatinine   Date  Value Ref Range Status   07/03/2019 0.70 0.60 - 1.10 mg/dL Final

## 2021-06-05 VITALS
SYSTOLIC BLOOD PRESSURE: 116 MMHG | HEART RATE: 88 BPM | TEMPERATURE: 97.8 F | WEIGHT: 157 LBS | RESPIRATION RATE: 16 BRPM | HEIGHT: 63 IN | BODY MASS INDEX: 27.82 KG/M2 | DIASTOLIC BLOOD PRESSURE: 68 MMHG | OXYGEN SATURATION: 98 %

## 2021-06-05 VITALS
RESPIRATION RATE: 20 BRPM | TEMPERATURE: 97.8 F | OXYGEN SATURATION: 98 % | HEART RATE: 85 BPM | BODY MASS INDEX: 28.35 KG/M2 | WEIGHT: 160 LBS | DIASTOLIC BLOOD PRESSURE: 78 MMHG | SYSTOLIC BLOOD PRESSURE: 122 MMHG | HEIGHT: 63 IN

## 2021-06-05 VITALS — BODY MASS INDEX: 27.82 KG/M2 | WEIGHT: 157 LBS | HEIGHT: 63 IN

## 2021-06-05 NOTE — TELEPHONE ENCOUNTER
Refill Approved    Rx renewed per Medication Renewal Policy. Medication was last renewed on 5/7/19.    Catrina Gerardo, [facility] Triage/Med Refill 2/3/2020     Requested Prescriptions   Pending Prescriptions Disp Refills     atorvastatin (LIPITOR) 10 MG tablet [Pharmacy Med Name: ATORVASTATIN 10 MG TABLET] 90 tablet 2     Sig: TAKE 1 TABLET BY MOUTH EVERY DAY       Statins Refill Protocol (Hmg CoA Reductase Inhibitors) Passed - 2/3/2020  1:39 AM        Passed - PCP or prescribing provider visit in past 12 months      Last office visit with prescriber/PCP: 7/3/2019 Shante Cohen MD OR same dept: 7/3/2019 Shante Cohen MD OR same specialty: 7/3/2019 Shante Cohen MD  Last physical: 12/11/2017 Last MTM visit: Visit date not found   Next visit within 3 mo: Visit date not found  Next physical within 3 mo: Visit date not found  Prescriber OR PCP: Shante Cohen MD  Last diagnosis associated with med order: 1. Hyperlipidemia  - atorvastatin (LIPITOR) 10 MG tablet [Pharmacy Med Name: ATORVASTATIN 10 MG TABLET]; TAKE 1 TABLET BY MOUTH EVERY DAY  Dispense: 90 tablet; Refill: 2    If protocol passes may refill for 12 months if within 3 months of last provider visit (or a total of 15 months).

## 2021-06-06 NOTE — TELEPHONE ENCOUNTER
Refill Approved    Rx renewed per Medication Renewal Policy. Medication was last renewed on 3/16/19.    Robyn Esqueda, [facility] Triage/Med Refill 3/4/2020     Requested Prescriptions   Pending Prescriptions Disp Refills     metFORMIN (GLUCOPHAGE) 500 MG tablet [Pharmacy Med Name: METFORMIN  MG TABLET] 360 tablet 3     Sig: TAKE 2 TABS BY MOUTH TWICE DAILY WITH FOOD.       Metformin Refill Protocol Passed - 3/4/2020  1:48 AM        Passed - Blood pressure in last 12 months     BP Readings from Last 1 Encounters:   02/26/20 120/72             Passed - LFT or AST or ALT in last 12 months     Albumin   Date Value Ref Range Status   02/26/2020 4.3 3.5 - 5.0 g/dL Final     Bilirubin, Total   Date Value Ref Range Status   02/26/2020 0.3 0.0 - 1.0 mg/dL Final     Alkaline Phosphatase   Date Value Ref Range Status   02/26/2020 95 45 - 120 U/L Final     AST   Date Value Ref Range Status   02/26/2020 26 0 - 40 U/L Final     ALT   Date Value Ref Range Status   02/26/2020 33 0 - 45 U/L Final     Protein, Total   Date Value Ref Range Status   02/26/2020 6.8 6.0 - 8.0 g/dL Final                Passed - GFR or Serum Creatinine in last 6 months     GFR MDRD Non Af Amer   Date Value Ref Range Status   02/26/2020 >60 >60 mL/min/1.73m2 Final     GFR MDRD Af Amer   Date Value Ref Range Status   02/26/2020 >60 >60 mL/min/1.73m2 Final             Passed - Visit with PCP or prescribing provider visit in last 6 months or next 3 months     Last office visit with prescriber/PCP: 2/26/2020 OR same dept: 2/26/2020 Shante Cohen MD OR same specialty: 2/26/2020 Shante Cohen MD Last physical: Visit date not found Last MTM visit: Visit date not found         Next appt within 3 mo: Visit date not found  Next physical within 3 mo: Visit date not found  Prescriber OR PCP: Shante Cohen MD  Last diagnosis associated with med order: 1. Type 2 diabetes mellitus without complication, with long-term current use of insulin  (H)  - metFORMIN (GLUCOPHAGE) 500 MG tablet [Pharmacy Med Name: METFORMIN  MG TABLET]; TAKE 2 TABS BY MOUTH TWICE DAILY WITH FOOD.  Dispense: 360 tablet; Refill: 3     If protocol passes may refill for 12 months if within 3 months of last provider visit (or a total of 15 months).           Passed - A1C in last 6 months     Hemoglobin A1c   Date Value Ref Range Status   02/26/2020 9.6 (H) 3.5 - 6.0 % Final     Comment:                       Passed - Microalbumin in last year      Microalbumin, Random Urine   Date Value Ref Range Status   03/29/2019 17.62 (H) 0.00 - 1.99 mg/dL Final

## 2021-06-06 NOTE — PATIENT INSTRUCTIONS - HE
Goal morning or pre-meal sugar:   Goal post-meal sugar (2 hours after eating): <180    Reduce Tresiba to 16 units to help reduce the frequency of morning low sugars.  Continue to work on your healthy diet and your regular exercise as you have been.  Have your eye doctor forward your note to us after you see them in April.

## 2021-06-06 NOTE — TELEPHONE ENCOUNTER
"Critical lab result: Glucose=54. Drawn @ 8:43am. Ordered by Dr SAMUEL Cohen. Hgb A1C today=   Seen In office today.  DX Type 2 diabetes mellitus without complication, with long term current use of insulin.   Per chart review: \"Because she is having some morning lows, I recommended that she reduce Tresiba to 16 units and adjust dose as needed to achieve morning sugar  range.\"   Metformin 2000 mg daily.   Tresiba had been 18 units @ bedtime.   Blood glucose testing 4 times per day  Freestyle Abran scan glucose sensor every 8 hrs.     Contacted patient: she states she knew she was low this morning. She has since eaten and monitored her blood sugar and states that it is normal.     Dr SAMUEL Cohen on call, contacted @ 6:02pm: no further orders given.    Elvira Rey RN Triage Nurse Advisor    Reason for Disposition    Lab or radiology calling with CRITICAL test results    Protocols used: PCP CALL - NO TRIAGE-A-      "

## 2021-06-06 NOTE — PROGRESS NOTES
Assessment/Plan:     1. Type 2 diabetes mellitus without complication, with long-term current use of insulin (H)  Control significantly improved.  A1c markedly higher than would be expected based on her home sugars.  She states that she is really made intensive lifestyle changes for just under 2 months, so we may be seeing some lingering effect from less healthy habits.  Is also possible her missing some stretches of hyperglycemia.  We discussed options.  We discussed option of Dexcom or similar continuous glucose monitor, she declines for now and would like to see how things go over the next 3 months.  Because she is having some morning lows, I recommended that she reduce Tresiba to 16 units and adjust dose as needed to achieve morning sugar  range.  Will check comprehensive metabolic panel today in monitoring of her metformin, will also check a fasting lipids this morning.  She will continue on aspirin and statin.  She has an eye exam scheduled in April, will forward those records to us.  - Glycosylated Hemoglobin A1c  - Comprehensive Metabolic Panel  - Lipid Cascade FASTING    2. Pulmonary hypertension, mild (H)  Remains asymptomatic.  She is euvolemic.    3. Primary hypercoagulable state (H)  This is not currently clinically active.  No edema or suggestion of venous thromboembolism.    4. Hypokalemia  Potassium dose was unintentionally reduced by pharmacy it appears.  I suspect they refilled an old prescription.  We will recheck her potassium and comprehensive metabolic panel today.  - potassium chloride (KLOR-CON M20) 20 MEQ tablet; Take 1 tablet (20 mEq total) by mouth 2 (two) times a day.  Dispense: 180 tablet; Refill: 1      Patient Instructions   Goal morning or pre-meal sugar:   Goal post-meal sugar (2 hours after eating): <180    Reduce Tresiba to 16 units to help reduce the frequency of morning low sugars.  Continue to work on your healthy diet and your regular exercise as you have been.   Have your eye doctor forward your note to us after you see them in April.       Return in about 3 months (around 5/26/2020) for Diabetes.      Subjective:      Mishel Garza is a 64 y.o. female sent into clinic today for follow-up of type 2 diabetes.  She is been compliant with Tresiba 18 units at bedtime and metformin 2000 mg daily.  Morning sugars have been in the 70s to 100s, occasionally will be in the 60s.  She is occasionally had some lows in the morning, rarely have they been symptomatic.  The lowest was 57.  After meals they are rarely greater than 180, most of the time 150 or less.  She is checking her sugar 4 times daily.  She has been watching her carbs more diligently and also has reintroduced exercise over the last month, is very excited about this, tolerating well.  She denies any chest pain, palpitations, shortness of breath, edema, or paresthesias.  She has an eye exam scheduled in April, her last was over a year and a half ago.  She is fasting this morning and due for follow-up of her lipids.    Current Outpatient Medications   Medication Sig Dispense Refill     aspirin 325 MG tablet Take 325 mg by mouth daily.       atorvastatin (LIPITOR) 10 MG tablet Take 1 tablet (10 mg total) by mouth daily. 90 tablet 1     blood glucose test (CONTOUR NEXT TEST STRIPS) strips Use to test blood sugars four times daily. Dispense brand per patient's insurance at pharmacy discretion. 100 strip 11     blood glucose test strips Use 1 each As Directed 4 (four) times a day. 100 strip 3     flash glucose scanning reader (FREESTYLE ELLYN 14 DAY READER) Misc Use 1 Units As Directed daily. Use reader to scan glucose  sensor every 8 hours 1 each 0     generic lancets Accu-Chek Multiclix Lancets Miscellaneous  Test BG 4x daily 100 each 11     lancets (ONETOUCH DELICA LANCETS) 33 gauge Misc 1 each by In Vitro route 4 (four) times a day. 100 each 3     metFORMIN (GLUCOPHAGE) 500 MG tablet TAKE 2 TABS BY MOUTH TWICE DAILY  "WITH FOOD. 360 tablet 3     multivitamin (MULTIPLE VITAMINS ORAL) Take by mouth. Centrum silver       pen needle, diabetic (BD ULTRA-FINE PRINCESS PEN NEEDLE) 32 gauge x 5/32\" Ndle USE TO INJECT TRESIBA ONCE DAILY 100 each 11     potassium chloride (KLOR-CON M20) 20 MEQ tablet Take 1 tablet (20 mEq total) by mouth 2 (two) times a day. 180 tablet 1     TRESIBA FLEXTOUCH U-100 100 unit/mL (3 mL) InPn INJECT 14 UNITS UNDER SKIN DAILY.INCREASE BY 2 UNITS EVERY 3 DS AS NEEDED UNTIL AT GOAL OR 18 UNITS 15 Syringe 0     No current facility-administered medications for this visit.        Past Medical History, Family History, and Social History reviewed.  Past Medical History:   Diagnosis Date     Factor V Leiden Mutation     Created by [facility] Annotation: May 19 2008 10:15AM Ciara Alvarado:  heterozygous;      High cholesterol      Pulmonary hypertension (H)     mild     Tricuspid valve regurgitation, nonrheumatic 3/3/2017    Likely secondary      Type 2 diabetes mellitus (H)     Created by Conversion      History reviewed. No pertinent surgical history.  Patient has no known allergies.  Family History   Problem Relation Age of Onset     Factor V Leiden deficiency Mother      Social History     Socioeconomic History     Marital status:      Spouse name: Not on file     Number of children: Not on file     Years of education: Not on file     Highest education level: Not on file   Occupational History     Not on file   Social Needs     Financial resource strain: Not on file     Food insecurity:     Worry: Not on file     Inability: Not on file     Transportation needs:     Medical: Not on file     Non-medical: Not on file   Tobacco Use     Smoking status: Never Smoker     Smokeless tobacco: Never Used   Substance and Sexual Activity     Alcohol use: No     Drug use: No     Sexual activity: Not on file   Lifestyle     Physical activity:     Days per week: Not on file     Minutes per session: Not on file " "    Stress: Not on file   Relationships     Social connections:     Talks on phone: Not on file     Gets together: Not on file     Attends Religion service: Not on file     Active member of club or organization: Not on file     Attends meetings of clubs or organizations: Not on file     Relationship status: Not on file     Intimate partner violence:     Fear of current or ex partner: Not on file     Emotionally abused: Not on file     Physically abused: Not on file     Forced sexual activity: Not on file   Other Topics Concern     Not on file   Social History Narrative     Not on file         Review of systems is as stated in HPI, and the remainder of the 10 system review is otherwise negative.    Objective:     Vitals:    02/26/20 0819   BP: 120/72   Pulse: 78   Resp: 16   Temp: 97.8  F (36.6  C)   SpO2: 99%   Weight: 148 lb 8 oz (67.4 kg)   Height: 5' 2.5\" (1.588 m)    Body mass index is 26.73 kg/m .    Male.  Mucous membranes moist.  Heart with regular rate rhythm.  Lungs clear.  Normal foot exam.  No edema.      This note has been dictated using voice recognition software. Any grammatical or context distortions are unintentional and inherent to the the software.   "

## 2021-06-07 NOTE — TELEPHONE ENCOUNTER
RN cannot approve Refill Request    RN can NOT refill this medication PCP messaged that patient is overdue for Labs. Last office visit: 2/26/2020 Shante Cohen MD Last Physical: 12/11/2017 Last MTM visit: Visit date not found Last visit same specialty: 2/26/2020 Shante Cohen MD.  Next visit within 3 mo: Visit date not found  Next physical within 3 mo: Visit date not found      Mechelle Schmid, [facility] Triage/Med Refill 4/15/2020    Requested Prescriptions   Pending Prescriptions Disp Refills     TRESIBA FLEXTOUCH U-100 100 unit/mL (3 mL) InPn [Pharmacy Med Name: TRESIBA FLEXTOUCH 100 UNIT/ML] 15 Syringe 0     Sig: INJECT 14 UNITS UNDER SKIN DAILY.INCREASE BY 2 UNITS EVERY 3 DS AS NEEDED UNTIL AT GOAL OR 18 UNITS       Insulin/GLP-1 Refill Protocol Failed - 4/15/2020 12:13 AM        Failed - Microalbumin in last year     Microalbumin, Random Urine   Date Value Ref Range Status   03/29/2019 17.62 (H) 0.00 - 1.99 mg/dL Final                  Passed - Visit with PCP or prescribing provider visit in last 6 months     Last office visit with prescriber/PCP: 2/26/2020 OR same dept: 2/26/2020 Shante Cohen MD OR same specialty: 2/26/2020 Shnate Cohen MD Last physical: Visit date not found Last MTM visit: Visit date not found     Next appt within 3 mo: Visit date not found  Next physical within 3 mo: Visit date not found  Prescriber OR PCP: Shante Cohen MD  Last diagnosis associated with med order: 1. Type 2 diabetes mellitus with diabetic polyneuropathy, with long-term current use of insulin (H)  - TRESIBA FLEXTOUCH U-100 100 unit/mL (3 mL) InPn [Pharmacy Med Name: TRESIBA FLEXTOUCH 100 UNIT/ML]; INJECT 14 UNITS UNDER SKIN DAILY.INCREASE BY 2 UNITS EVERY 3 DS AS NEEDED UNTIL AT GOAL OR 18 UNITS  Dispense: 15 Syringe; Refill: 0    If protocol passes may refill for 6 months if within 3 months of last provider visit (or a total of 9 months).              Passed - A1C in last 6 months      Hemoglobin A1c   Date Value Ref Range Status   02/26/2020 9.6 (H) 3.5 - 6.0 % Final     Comment:                       Passed - Blood pressure in last year     BP Readings from Last 1 Encounters:   02/26/20 120/72             Passed - Creatinine done in last year     Creatinine   Date Value Ref Range Status   02/26/2020 0.66 0.60 - 1.10 mg/dL Final

## 2021-06-08 NOTE — PROGRESS NOTES
"Height: 5' 2.75\" (159.4 cm)  Weight: 145 lb (65.8 kg)  BMI (Calculated): 25.9  BSA (Calculated - sq m): 1.7 sq meters    Email:  Spouse: -Karl  Children: 1 Daughter   Employment: [employer]  Smoking, Alcohol, other: None  [facility] Follow up s: Weekly in clinic meetings-   CCC follow up's: In clinic meetings Weekly  CCC/[facility] Enrollment: December 3rd 2013    "
no

## 2021-06-08 NOTE — PROGRESS NOTES
Assessment/Plan:     1. Edema  Improved but inadequately resolved.  We discussed options.  Continue furosemide at current dose.  Will check basic metabolic panel in monitoring of this.  Will check echocardiogram to look for underlying cardiovascular cause in light of slightly elevated BNP.  If unremarkable, would consider imaging of her abdomen and pelvis to assess for obstructing lesions given distribution.  She states understanding.  Further follow-up and recommendations pending results.  - Basic Metabolic Panel    2. Type 2 diabetes mellitus  Referral made to [facility] pharmacist for further assistance in management.  Will initiate Lantus insulin to assist in rapid reduction of blood sugars given significant elevations.  I have her check her sugars 2-4 times daily.  She is given prescriptions for meters and test strips.  She will continue to increase metformin as tolerated.  She remains on atorvastatin and aspirin.        Subjective:      Mishel Garza is a 61 y.o. female presented to clinic today for follow-up of edema and type 2 diabetes.  In regards to the edema, feels that it significantly improved.  Seems to worsen in her legs at the end of the day.  Continues to feel significant edema in her abdomen and her legs, significantly better higher in her body however.  She has been working to increase her water intake and is trying to do so even further.  We review weight loss of 13 pounds since last visit.  Feels as though the diuretic effect of the furosemide is less intense than she would have expected.    Checking morning sugars, they are mostly in the 300s throughout the day.  She is working on the treadmill again, gradually introducing.  Denies any low sugars.  Tolerating increased dose of metformin.    Current Outpatient Prescriptions   Medication Sig Dispense Refill     atorvastatin (LIPITOR) 10 MG tablet TAKE ONE TABLET BY MOUTH ONE TIME DAILY 90 tablet 4     blood glucose test (CONTOUR NEXT STRIPS)  "strips Use to test blood sugars four times daily. Dispense brand per patient's insurance at pharmacy discretion. 100 each 11     furosemide (LASIX) 40 MG tablet Take 1 tablet (40 mg total) by mouth daily. 30 tablet 1     generic lancets Accu-Chek Multiclix Lancets Miscellaneous  Test BG 4x daily 100 each 11     insulin degludec (TRESIBA FLEXTOUCH U-100) 100 unit/mL (3 mL) InPn Inject 10 Units under the skin daily. 9 mL 0     metFORMIN (GLUCOPHAGE) 500 MG tablet Take 2 tablets (1,000 mg total) by mouth 2 (two) times a day with meals. 120 tablet 3     pen needle, diabetic (BD ULTRA-FINE PRINCESS PEN NEEDLES) 32 gauge x 5/32\" Ndle Use to inject tresiba once daily 100 each 11     triamcinolone (KENALOG) 0.1 % cream Apply to Rash Twice Daily       No current facility-administered medications for this visit.        Past Medical History, Family History, and Social History reviewed.  No past medical history on file.  No past surgical history on file.  Review of patient's allergies indicates no known allergies.  No family history on file.  Social History     Social History     Marital status:      Spouse name: N/A     Number of children: N/A     Years of education: N/A     Occupational History     Not on file.     Social History Main Topics     Smoking status: Never Smoker     Smokeless tobacco: Not on file     Alcohol use No     Drug use: No     Sexual activity: Not on file     Other Topics Concern     Not on file     Social History Narrative         Review of systems is as stated in HPI, and the remainder of the 10 system review is otherwise negative.    Objective:     Vitals:    02/02/17 0905   BP: 110/74   Patient Site: Right Arm   Patient Position: Sitting   Cuff Size: Adult Regular   Pulse: 72   Resp: 20   Temp: 98.6  F (37  C)   Weight: 145 lb (65.8 kg)   Height: 5' 2.75\" (1.594 m)    Body mass index is 25.89 kg/(m^2).    Alert female.  Mucous members moist.  Neck supple without lymphadenopathy or thyromegaly.  " Heart with regular rate and rhythm.  Lungs clear.  Extremities with 3+ edema.  A little bit edematous in her abdomen without a fluid wave.  No edema in her arms or face.      This note has been dictated using voice recognition software. Any grammatical or context distortions are unintentional and inherent to the the software.

## 2021-06-08 NOTE — PROGRESS NOTES
Because Mishel currently has Edema, we are not taking her weight into consideration for her weight loss goal. Mishel's weight today is 155lbs she has a 3 pounds loss from yesterday. This is noted because of Edema.    Email:  Spouse: -Karl  Children: 1 Daughter   Employment: [employer]  Smoking, Alcohol, other: None  CCC/[facility] Follow up s: Weekly in clinic meetings-   CCC follow up's: In clinic meetings Weekly  CCC/[facility] Enrollment: December 3rd 2013

## 2021-06-08 NOTE — PROGRESS NOTES
"Mishel has accomplished the goal \"I want to work to lose 8 pounds to get to the goal weight of 135 pounds.\" We completed that goal today.    Height: 5' 2.75\" (159.4 cm)  Weight: 135 lb (61.2 kg)  BMI (Calculated): 24.1  BSA (Calculated - sq m): 1.64 sq meters    Email:  Spouse: Kenrick  Children: 1 Daughter-Fracisco  Employment: [employer]  Smoking, Alcohol, other: None  CCC/[facility] Follow up s: Weekly in clinic meetings-   CCC follow up's: In clinic meetings Weekly  CCC/[facility] Enrollment: December 3rd 2013    "

## 2021-06-08 NOTE — PROGRESS NOTES
PC from Mishel to reschedule [facility] Follow Up appointment that was missed on 1/11/17. Appointment has been rescheduled for 1/26/17 @ 4:30.

## 2021-06-08 NOTE — PROGRESS NOTES
Medication Therapy Management Clinical Consult     Mishel Garza is a 61 y.o. female referred for a clinical pharmacist consult from Dr. Cohen.   Reason for Consult: Insulin start and new BG meter    Discussion: Mishel's insurance is changing her from an Accu-Chek to a Anna product. I provided her with a Anna Contour EZ Meter and demonstrated use. She was not happy that she has to change meters, but was able to correctly demonstrate how to use.     Reports that currently her BG are in the 300s. Describes polyuria, otherwise no symptoms of hyperglycemia. She was not interested in starting insulin. Reviewed role of insulin in diabetes, monitoring closely for hypoglycemia, storage and administration. She was able to set up the insulin pen correctly and administer. She was actually surprised at how easy it is and was unaware that insulin came in a pen. I recommended starting Tresiba -- a sample was provided today. Will have her start at 10 units and titrate up. I will call her in a week to see how it is going.     Plan:  1. Anna Contour Next EZ Meter provided  2. Tresiba 10 units started today.     Follow up:   1 week phone call    Elizabeth Underwood, Pharm.D., BCACP

## 2021-06-08 NOTE — PROGRESS NOTES
Clinic Care Coordination/Health Care Home follow up for 2/9/17 has been rescheduled for 2/14/17 @ 4:30.    Email:  Spouse: BismarkKarl  Children: 1 Daughter   Employment: [email or web address]  Smoking, Alcohol, other: None  CCC/[facility] Follow up s: Weekly in clinic meetings-   CCC follow up's: In clinic meetings Weekly  CCC/[facility] Enrollment: December 3rd 2013

## 2021-06-08 NOTE — TELEPHONE ENCOUNTER
RN cannot approve Refill Request    RN can NOT refill this medication PCP messaged that patient is overdue for Labs. Last office visit: 2/26/2020 Shante Cohen MD Last Physical: 12/11/2017 Last MTM visit: Visit date not found Last visit same specialty: 2/26/2020 Shante Cohen MD.  Next visit within 3 mo: Visit date not found  Next physical within 3 mo: Visit date not found      Olya Simmons, [facility] Triage/Med Refill 5/25/2020    Requested Prescriptions   Pending Prescriptions Disp Refills     metFORMIN (GLUCOPHAGE) 500 MG tablet [Pharmacy Med Name: METFORMIN  MG TABLET] 120 tablet 2     Sig: TAKE 2 TABS BY MOUTH TWICE DAILY WITH FOOD.       Metformin Refill Protocol Failed - 5/24/2020 12:24 AM        Failed - Microalbumin in last year      Microalbumin, Random Urine   Date Value Ref Range Status   03/29/2019 17.62 (H) 0.00 - 1.99 mg/dL Final                  Passed - Blood pressure in last 12 months     BP Readings from Last 1 Encounters:   02/26/20 120/72             Passed - LFT or AST or ALT in last 12 months     Albumin   Date Value Ref Range Status   02/26/2020 4.3 3.5 - 5.0 g/dL Final     Bilirubin, Total   Date Value Ref Range Status   02/26/2020 0.3 0.0 - 1.0 mg/dL Final     Alkaline Phosphatase   Date Value Ref Range Status   02/26/2020 95 45 - 120 U/L Final     AST   Date Value Ref Range Status   02/26/2020 26 0 - 40 U/L Final     ALT   Date Value Ref Range Status   02/26/2020 33 0 - 45 U/L Final     Protein, Total   Date Value Ref Range Status   02/26/2020 6.8 6.0 - 8.0 g/dL Final                Passed - GFR or Serum Creatinine in last 6 months     GFR MDRD Non Af Amer   Date Value Ref Range Status   02/26/2020 >60 >60 mL/min/1.73m2 Final     GFR MDRD Af Amer   Date Value Ref Range Status   02/26/2020 >60 >60 mL/min/1.73m2 Final             Passed - Visit with PCP or prescribing provider visit in last 6 months or next 3 months     Last office visit with prescriber/PCP:  2/26/2020 OR same dept: 2/26/2020 Shante Cohen MD OR same specialty: 2/26/2020 Shante Cohen MD Last physical: Visit date not found Last MTM visit: Visit date not found         Next appt within 3 mo: Visit date not found  Next physical within 3 mo: Visit date not found  Prescriber OR PCP: Shante Cohen MD  Last diagnosis associated with med order: 1. Type 2 diabetes mellitus without complication, with long-term current use of insulin (H)  - metFORMIN (GLUCOPHAGE) 500 MG tablet [Pharmacy Med Name: METFORMIN  MG TABLET]; TAKE 2 TABS BY MOUTH TWICE DAILY WITH FOOD.  Dispense: 120 tablet; Refill: 2     If protocol passes may refill for 12 months if within 3 months of last provider visit (or a total of 15 months).           Passed - A1C in last 6 months     Hemoglobin A1c   Date Value Ref Range Status   02/26/2020 9.6 (H) 3.5 - 6.0 % Final     Comment:

## 2021-06-08 NOTE — PROGRESS NOTES
Assessment/Plan:     1. Type 2 diabetes mellitus  Very poor control.  This is likely due to very poor lifestyle habits.  In the past has been quite successful in improving diet and re-engaging in her cares and has historically brought an A1c of greater than 14 down to less than 7.  We will see how she can do over the next week, will hold off on initiating additional medication until further evaluation of edema has been performed.  - Glycosylated Hemoglobin A1c    2. Hyperlipidemia  She remains on atorvastatin.  She is not fasting today.  We will need to obtain a fasting cholesterol panel at future visit.  - atorvastatin (LIPITOR) 10 MG tablet; TAKE ONE TABLET BY MOUTH ONE TIME DAILY  Dispense: 90 tablet; Refill: 4    3. Edema  We discussed broad differential diagnosis including renal disease, liver disease, thyroid disease, and cardiac disease primarily.  Will assess further with labs as noted.  Prescription provided for furosemide to take once daily.  Will reassess in 1 week.  - Electrocardiogram Perform and Read  - Urinalysis-UC if Indicated  - Microalbumin, Random Urine  - BNP(B-type Natriuretic Peptide)  - Comprehensive Metabolic Panel  - Thyroid Cascade        Subjective:      Mishel Garza is a 61 y.o. female presented to clinic today for follow-up of type 2 diabetes and for assessment of severe edema.  States over the past year she has been caring for herself at all.  Unfortunately her parents were ill, both of them with cancer, and her father has since passed away in the fall.  Her mother is now doing well with her health.  Patient notes that she began not caring about her own health.  About 2-1/2 weeks ago she developed severe edema in her lower extremities up to her abdomen, states that this was eye opening for her and reminded her that she should care about her own health.  She remained compliant with her metformin throughout this time but has not been checking her blood sugars, exercising, and  states that her eating habits are quite poor.  Her appetite increased significantly with her stressors.  She denies any chest pain, palpitations, shortness of breath, lightheadedness, or dizziness.  No nausea, early satiety, or abdominal pain.  Bowels moving normally.  Denies fevers or chills.  No night sweats.  Denies excess bone in urine.  No jaundice.    Current Outpatient Prescriptions   Medication Sig Dispense Refill     metFORMIN (GLUCOPHAGE) 500 MG tablet Take 2 tablets (1,000 mg total) by mouth 2 (two) times a day with meals. 120 tablet 3     aspirin 325 MG tablet Take 325 mg by mouth daily.       atorvastatin (LIPITOR) 10 MG tablet TAKE ONE TABLET BY MOUTH ONE TIME DAILY 90 tablet 4     blood glucose test (ACCU-CHEK NARESH) strips Use 1 Strip 4 Times Daily 100 each 11     BLOOD-GLUCOSE METER (ACCU-CHEK NARESH PLUS METER MISC) Accu-Chek Naresh Plus w/Device Kit       calcium carbonate-vitamin D3 (CALCIUM 500 + D) 500 mg(1,250mg) -400 unit Tab Take 1 tablet by mouth 2 (two) times a day.       furosemide (LASIX) 40 MG tablet Take 1 tablet (40 mg total) by mouth daily. 30 tablet 1     generic lancets Accu-Chek Multiclix Lancets Miscellaneous  Test BG 4x daily 100 each 11     krill oil-omega-3-dha-epa 300-90 (27-45) mg cap Take 1 capsule by mouth daily.       MULTIVITS-MIN/FA/CA CARB/VIT K (ONE-A-DAY WOMEN'S 50+ ORAL) Take by mouth daily.       triamcinolone (KENALOG) 0.1 % cream Apply to Rash Twice Daily       No current facility-administered medications for this visit.        Past Medical History, Family History, and Social History reviewed.  No past medical history on file.  No past surgical history on file.  Review of patient's allergies indicates no known allergies.  No family history on file.  Social History     Social History     Marital status:      Spouse name: N/A     Number of children: N/A     Years of education: N/A     Occupational History     Not on file.     Social History Main Topics      "Smoking status: Never Smoker     Smokeless tobacco: Not on file     Alcohol use No     Drug use: No     Sexual activity: Not on file     Other Topics Concern     Not on file     Social History Narrative         Review of systems is as stated in HPI, and the remainder of the 10 system review is otherwise negative.    Objective:     Vitals:    01/25/17 1648   BP: 136/80   Patient Site: Right Arm   Patient Position: Sitting   Cuff Size: Adult Regular   Pulse: 72   Resp: 20   Temp: 98  F (36.7  C)   TempSrc: Oral   Weight: 158 lb (71.7 kg)   Height: 5' 2.75\" (1.594 m)    Body mass index is 28.21 kg/(m^2).    Alert female.  Mucous membranes moist.  Neck supple without lymphadenopathy or thyromegaly.  Heart with regular rate and rhythm.  Lungs clear.  Abdomen is soft without organomegaly but is somewhat puffy.  I do not appreciate any clear fluid wave.  Extremities are with 3+ pitting edema, pallor but good capillary refill.    I ordered and personally reviewed a 12-lead EKG which shows normal sinus rhythm and no arrhythmic or ischemic changes.      This note has been dictated using voice recognition software. Any grammatical or context distortions are unintentional and inherent to the the software.     "

## 2021-06-09 NOTE — PROGRESS NOTES
Next Clinic Care Coordination/Health Care Home follow up is 4/11/17 @ 4:30.    Email:  Spouse: Kenrick  Children: 1 Daughter-Fracisco  Employment: [employer]  Smoking, Alcohol, other: None  [facility] Follow up s: Weekly in clinic meetings-   CCC follow up's: In clinic meetings Weekly  CCC/[facility] Enrollment: December 3rd 2013

## 2021-06-09 NOTE — PROGRESS NOTES
From: Jean Pierre Wilcox DO     Sent: 3/3/2017   5:06 PM       To: Iwona Aviles RN    I spoke with the patient regarding CTA chest findings.  I would recommend we obtain a stress echo (she knows she will be called on Monday).   Indications: Dyspnea on exertion, cardiomegaly, DM and chest pain.   She will continue on the lasix therapy.

## 2021-06-09 NOTE — PROGRESS NOTES
PC to Mishel and soledad scheduled next Clinic Care Coordination/Health Care Home follow up appointment for 3/28/17 @ 4:30.    Email:  Spouse: -Karl  Children: 1 Daughter-Fracisco  Employment: [employer]  Smoking, Alcohol, other: None  [facility] Follow up s: Weekly in clinic meetings-   CCC follow up's: In clinic meetings Weekly  [facility] Enrollment: December 3rd 2013

## 2021-06-09 NOTE — PROGRESS NOTES
Clinic Care Coordination/Health Care Home follow up has been rescheduled for 4/4/17 @ 3:30.    Email:  Spouse: Kenrick  Children: 1 Daughter-Fracisco  Employment: [employer]  Smoking, Alcohol, other: None  [facility] Follow up s: Weekly in clinic meetings-   CCC follow up's: In clinic meetings Weekly  [facility] Enrollment: December 3rd 2013

## 2021-06-09 NOTE — PROGRESS NOTES
PC from Mishel to cancel Clinic Care Coordination/Health Care Home follow up because she has an ECHO test today. Mishel stated she would call me to reschedule.    Email:  Spouse: -Karl  Children: 1 Daughter-Fracisco  Employment: [employer]  Smoking, Alcohol, other: None  [facility] Follow up s: Weekly in clinic meetings-   CCC follow up's: In clinic meetings Weekly  [facility] Enrollment: December 3rd 2013

## 2021-06-09 NOTE — PROGRESS NOTES
Email:  Spouse: Kenrick  Children: 1 Daughter-Fracisco  Employment: [employer]  Smoking, Alcohol, other: None  [facility] Follow up s: Weekly in clinic meetings-   CCC follow up's: In clinic meetings Weekly  [facility] Enrollment: December 3rd 2013

## 2021-06-09 NOTE — PROGRESS NOTES
Email:  Spouse: Kenrick  Children: 1 Daughter-Fracisco  Employment: [employer]  Smoking, Alcohol, other: None  [facility] Follow up s: Weekly in clinic meetings-   CCC follow up's: In clinic meetings Weekly  [facility] Enrollment: December 3rd 2013

## 2021-06-09 NOTE — PROGRESS NOTES
Assessment/Plan:     1. SI (sacroiliac) pain  Reviewed nature of condition.  Could consider seeing a chiropractor, massage therapist, physical therapist.  She declines referral today.  Advised NSAIDs very sparingly in light of ongoing edema.  Prescription provided for cyclobenzaprine to sparingly as needed, cautioned in regards to sedating effects, not to combine with alcohol or other sedating agents and do not drive after taking.  May use ice or heat as needed.  Gentle stretches at home.  Advise repositioning at night using a pillow between her legs to keep her fine pelvis in line.    2. Edema  Continue furosemide for now.  Unclear whether this could be contributed to by her tricuspid regurgitation.  Will request cardiology expertise in this.    3. Tricuspid regurgitation  Unclear if peripheral edema as a result of tricuspid regurgitation.  I have asked that she seek cardiology opinion sometime in the next few weeks to months.  She is resistant to doing this initially, however I encourage importance of this should the valvular disease causing her significant symptoms..    4. Type 2 diabetes mellitus  Blood sugars slowly but steadily improving.  Continue to titrate Lantus insulin.      Subjective:      Mishel Garza is a 61 y.o. female presented to clinic today for follow-up of severe peripheral edema.  Edema continues to improve on a steady basis.  Weight is down significantly again today from last visit, down 6 pounds on our scale.  She is noting improvement as well.  No longer noting puffiness in her abdomen or fingers.  Ankles continue to be edematous and worse later in the day.  Following last visit we obtain an echocardiogram which indicated moderate tricuspid regurgitation, mild mitral regurgitation, mildly elevated pulmonary artery pressures.  She had normal systolic function.  No other abnormalities identified.  She does not know of any prior valvular disease.    She is very concerned about severe  "low back pain that began on February 4, 2017 after she stood for about 2 and half hours.  The next day she had significant pain in her right low back.  She is using heat but despite this having significant pain.  Interestingly the pain is primarily present at night disrupting sleep rather significantly.  Seems to primarily be in the right buttock area and now is radiating across her back.  Nothing in her legs.  Aleve is minimally helpful.  Difficulty in finding a comfortable position at night.  This is very distressing for her.    She remains on Lantus insulin, currently taking 12 units at bedtime.  Her sugars are now in the low 200s, sometimes in the 180s.  Denies any low sugars.    Current Outpatient Prescriptions   Medication Sig Dispense Refill     aspirin 325 MG tablet Take 325 mg by mouth daily.       atorvastatin (LIPITOR) 10 MG tablet TAKE ONE TABLET BY MOUTH ONE TIME DAILY 90 tablet 4     blood glucose test (CONTOUR NEXT STRIPS) strips Use to test blood sugars four times daily. Dispense brand per patient's insurance at pharmacy discretion. 100 each 11     furosemide (LASIX) 40 MG tablet Take 1 tablet (40 mg total) by mouth daily. 30 tablet 1     generic lancets Accu-Chek Multiclix Lancets Miscellaneous  Test BG 4x daily 100 each 11     insulin degludec (TRESIBA FLEXTOUCH U-100) 100 unit/mL (3 mL) InPn Inject 10 Units under the skin daily. 9 mL 0     metFORMIN (GLUCOPHAGE) 500 MG tablet Take 2 tablets (1,000 mg total) by mouth 2 (two) times a day with meals. 120 tablet 3     multivitamin with minerals (THERA-M) 9 mg iron-400 mcg Tab tablet Take 1 tablet by mouth daily.       pen needle, diabetic (BD ULTRA-FINE PRINCESS PEN NEEDLES) 32 gauge x 5/32\" Ndle Use to inject tresiba once daily 100 each 11     triamcinolone (KENALOG) 0.1 % cream Apply to Rash Twice Daily       cyclobenzaprine (FLEXERIL) 5 MG tablet Take 1-2 tablets (5-10 mg total) by mouth bedtime as needed for muscle spasms. 30 tablet 1     No current " "facility-administered medications for this visit.        Past Medical History, Family History, and Social History reviewed.  No past medical history on file.  No past surgical history on file.  Review of patient's allergies indicates no known allergies.  No family history on file.  Social History     Social History     Marital status:      Spouse name: N/A     Number of children: N/A     Years of education: N/A     Occupational History     Not on file.     Social History Main Topics     Smoking status: Never Smoker     Smokeless tobacco: Not on file     Alcohol use No     Drug use: No     Sexual activity: Not on file     Other Topics Concern     Not on file     Social History Narrative         Review of systems is as stated in HPI, and the remainder of the 10 system review is otherwise negative.    Objective:     Vitals:    02/22/17 1541   BP: 130/70   Patient Site: Left Arm   Patient Position: Sitting   Cuff Size: Adult Regular   Pulse: 64   Temp: 97.8  F (36.6  C)   TempSrc: Oral   Weight: 129 lb (58.5 kg)   Height: 5' 3\" (1.6 m)    Body mass index is 22.85 kg/(m^2).    Alert female.  Mucous membranes moist.  Heart with regular rate and rhythm, no murmurs.  Lungs clear and well aerated.  Extremities are with 1+ edema, pitting.  This extends to just below her knee.  There is palpation right SI joint and paraspinous muscles of the lumbar spine right greater than left.  She has good range of motion of her legs bilaterally.  Little bit of discomfort with external rotation at the hip.  Out of 5 strength throughout her lower extremities.  Symmetric deep tendon reflexes.      This note has been dictated using voice recognition software. Any grammatical or context distortions are unintentional and inherent to the the software.     "

## 2021-06-10 NOTE — PROGRESS NOTES
Assessment/Plan:     1. Unexplained weight loss  We discussed broad differential diagnosis.  It is possible that her poorly controlled diabetes is contributing to this, however this is quite out of proportion to what I would anticipate given her previous health.  Because of underlying edema also remains unclear.  We will assess things further with multitude of labs as noted.  We discussed potential imaging with CT scan of the abdomen and pelvis in the future if no obvious etiology is identified, and would anticipate looking for malignancy or other mass-effect that may be obstructing venous return in her abdomen or pelvis.  She is comfortable with this plan.  - Morphology,Smear Review (MORP)  - Electrophoresis, Protein, Serum  - Electrophoresis, Protein, Random Urine Ccade  - Celiac(Gluten)Antibody Panel ($$$)  - Thyroid Cascade  - HM1(CBC and Differential)  - HM1 (CBC with Diff)  - Peripheral Blood Smear, Path Review    2. Pulmonary hypertension, mild  We discussed this condition briefly, unclear how much this may be contributing to her current condition.  I advised that she schedule an appointment with cardiology pulmonary hypertension specialist to discuss further.    3. Pitting edema  Continue furosemide, but may use it just as needed if she develops edema or if her weight changes more than 2-3 pounds in 24 hours or more than 5 pounds and over the course of 5 days.  Advised continued efforts at elevating her legs.  Continue low-salt diet.  May need to consider imaging of the abdomen and pelvis as noted above.  - furosemide (LASIX) 40 MG tablet; Take one tablet daily as needed for swelling  Dispense: 30 tablet; Refill: 3    4. History of mammography, screening  Referral made for follow-up mammogram.  - Mammo Screening Bilateral; Future    5. Type 2 diabetes mellitus without complication, with long-term current use of insulin  Seems to be doing better on current regimen.  Advised follow-up with diabetes educator  as scheduled.      Subjective:      Mishel Garza is a 61 y.o. female presented to clinic today for follow-up of her ongoing concerns.  She remains on Lantus 14 units, has follow-up scheduled a diabetes educator to manage this further and feels like overall things are improving.  She continues to struggle with SI joint pain is it is disrupting her sleep.  She has been taking either aspirin or ibuprofen regularly and that seems somewhat helpful.  She continues to take furosemide up until about a week ago, notes that she has left worse than right leg edema at the end of the day but in the morning her edema is gone.  She is excited about this.  She is noting however that her weight has gone down significantly from her baseline which is typically in the 140s.  She feels that this is out of proportion to her current activity levels and her current eating habits and is concerned there may be something more going on.  She had a recent visit with cardiology in regards to mild abnormalities on echocardiogram, they were concerned about heart valves, she had a stress echo that was normal, and she had a CT of the chest that was without PEs.  She was noted to have help pulmonary hypertension, notes that this was not discussed in depth at the time of her visit, but she has follow-up in about 3 months.  Wondering if this is of concern could be contributing to her edema.  Her appetite remains very poor.  She struggles with constipation, is uncertain why.  She is off furosemide now.  She is taking Dulcolax twice and notes that her last bowel movement was about 9 days ago.  She is feeling very full and is though she is having difficulty passing stool.  She is noting an numb sensation in her lower rib cage and her low back feels as though it also has less sensation last few weeks.  States it does not affect her legs.  She is feeling more fatigued, has been less active because she is feeling crummy, describes herself as easily  "exhausted.  This is a change for her.  Her stomach has been more sore after insulin injections.  She remains compliant with metformin but is ensuring that she takes it with food.  She is noting ongoing hair loss as well.    Current Outpatient Prescriptions   Medication Sig Dispense Refill     aspirin 325 MG tablet Take 325 mg by mouth daily.       atorvastatin (LIPITOR) 10 MG tablet TAKE ONE TABLET BY MOUTH ONE TIME DAILY 90 tablet 4     blood glucose test (CONTOUR NEXT STRIPS) strips Use to test blood sugars four times daily. Dispense brand per patient's insurance at pharmacy discretion. 100 each 11     furosemide (LASIX) 40 MG tablet Take one tablet daily as needed for swelling 30 tablet 3     generic lancets Accu-Chek Multiclix Lancets Miscellaneous  Test BG 4x daily 100 each 11     insulin degludec (TRESIBA FLEXTOUCH U-100) 100 unit/mL (3 mL) InPn Inject 10 Units under the skin daily. (Patient taking differently: Inject 14 Units under the skin daily. ) 9 mL 0     metFORMIN (GLUCOPHAGE) 500 MG tablet TAKE 2 TABLETS (1,000 MG TOTAL) BY MOUTH 2 (TWO) TIMES A DAY WITH MEALS. 120 tablet 3     multivitamin with minerals (THERA-M) 9 mg iron-400 mcg Tab tablet Take 1 tablet by mouth daily.       pen needle, diabetic (BD ULTRA-FINE PRINCESS PEN NEEDLES) 32 gauge x 5/32\" Ndle Use to inject tresiba once daily 100 each 11     triamcinolone (KENALOG) 0.1 % cream as needed.        No current facility-administered medications for this visit.        Past Medical History, Family History, and Social History reviewed.  Past Medical History:   Diagnosis Date     Factor V Leiden Mutation     Created by Conversion [facility] Annotation: May 19 2008 10:15AM - Ciara Crouch:  heterozygous;      High cholesterol      Pulmonary hypertension     mild     Tricuspid valve regurgitation, nonrheumatic 3/3/2017    Likely secondary      Type 2 diabetes mellitus     Created by Conversion      No past surgical history on file.  Review of " patient's allergies indicates no known allergies.  Family History   Problem Relation Age of Onset     Factor V Leiden deficiency Mother      Social History     Social History     Marital status:      Spouse name: N/A     Number of children: N/A     Years of education: N/A     Occupational History     Not on file.     Social History Main Topics     Smoking status: Never Smoker     Smokeless tobacco: Not on file     Alcohol use No     Drug use: No     Sexual activity: Not on file     Other Topics Concern     Not on file     Social History Narrative         Review of systems is as stated in HPI, and the remainder of the 10 system review is otherwise negative.    Objective:     Vitals:    04/04/17 1600   BP: 132/84   Patient Site: Left Arm   Patient Position: Sitting   Cuff Size: Adult Regular   Pulse: 86   Resp: 16   Weight: 108 lb 9 oz (49.2 kg)    Body mass index is 18.93 kg/(m^2).    Alert female.  Ankles with trace edema bilaterally.  Heart is with regular rate and rhythm.  Lungs are clear and well aerated.  Abdomen is soft, nondistended, no palpable masses.      This note has been dictated using voice recognition software. Any grammatical or context distortions are unintentional and inherent to the the software.

## 2021-06-10 NOTE — PROGRESS NOTES
Clinic Care Coordination/Health Care Home Follow up has been rescheduled for 4/18/17 @ 4:30.      Email:  Spouse: Kenrick  Children: 1 Daughter-Fracisco  Employment: [employer]  Smoking, Alcohol, other: None  [facility] Follow up s: Weekly in clinic meetings-   CCC follow up's: In clinic meetings Weekly  [facility] Enrollment: December 3rd 2013

## 2021-06-10 NOTE — PROGRESS NOTES
"Assessment: Mishel is here today to talk about her recent changes in her blood sugar and A1C.    She has had some recent health changes that could be effecting her glucose as well.  Stated starting in February she was having back pain.  This was causing difficulty sleeping and getting through her day.  Stated she also has had no appetite and feels like she has to force herself to eat.  Stated food does not taste the same.  She has had a significant weight loss.    Mishel eats 3 meals per day.  B- today was 1/2 english muffin and blueberries.  2 bites of a banana, stated helps with her \"zaria horses\" she gets at night.  L- today is chicken salad, no noodles. 10 crackers, 1 carrot, and 15 grapes  D-last night was soup, mostly broth, 1/2 grilled cheese, 1/2 nectarine and 1/2 carrot  She drinks Glucerna shakes to add some protein into her diet when she feels she is not getting enough from her meals.    Mishel has been checking her blood sugar 3-5 times per day.  Since increasing her Tresiba her FBG readings have been 106-133.  Her other readings range from 142-279.  I gave her a TID check sheet to help check glucose at different times of the day.  This information could reveal why her A1C is so high yet her FBG are within range.  She will do this for 2 weeks and follow-up with her primary.    Mishel is taking Metformin 1000mg BID.  She takes this in the morning before breakfast and in the evening with supper.  She also is taking Tresiba U-100 14 before bed.  Stated this seems to sting a bit when she injects and this could be from the insulin going under her skin.  Stated it does not last long and is not too bothersome.    Plan: Unsure why [place] glucose is so up and down and reason for increased A1C.  She will test TID and bring these readings in when she meets with her primary on 04.18.17.  We will decide from there what is the next step.    Subjective and Objective:      Mishel Garza is referred by Dr. Frey " "Noel for Diabetes Education.     Lab Results   Component Value Date    HGBA1C >14.0 (H) 01/25/2017         Current diabetes medications:  Metformin 1000mg BID.  Tresiba U-100 14 units at bedtime.    Goals        General      I want to take steps to lower my A1C. (pt-stated)            Action steps to achieve this goal  1.  I will test my blood sugars four times a day. I have not been checking my blood sugars. But will be working to get back on track with this.  2.  I will work to get my blood sugars when fasting from  and after meals below 180. I am not sure how my blood sugars have been lately because of not checking my blood sugars.  3.  I will record my blood sugars daily in my \"Diabetes Record Book\". I have not been checking my blood sugars. But will be working to get back on track with this.  4.  I will eat an appropriate Diabetic Diet and record my foods on a daily food log. I have not been eating well lately.  5.  I will take my Diabetes medications as prescribed. I have been doing good staying consistent with this action step.My Care Guide helped me make an appointment with the Diabetic Educator for 4/6/17 @ 8:00  6.  I will continue to exercise at home daily, walking for 20 minutes daily on my T[employer] mill; then also weights and leg exercises 3 times a week for 30 min each time. I have not stayed that consistent with this action step, but will be working to get back on track.  7.  I will work to start walking at work daily, walking for 10 minutes in the morning; then walking 2-3 times a week in the afternoon for 10 minutes each time. I have not stayed that consistent with this action step but will be working to get back on track.    Goal Updated: April 4th 2017  Date goal set:  November 10th 2015         Other      HEMOGLOBIN A1C < 8.0            Goals from 04.06.17:  Monitoring-  Check glucose three times per day as discussed and document  Medications-  Continue as prescribed at this " time  Nutrition-  Continue with current meal plan            Follow up:   Primary care visit      Education:     Monitoring   Meter (per above goals): Assessed and Discussed  Monitoring: Assessed, Discussed and Literature provided  BG goals: Assessed and Discussed    Nutrition Management  Nutrition Management: Assessed and Discussed  Weight: Assessed and Discussed  Portions/Balance: Assessed and Discussed  Carb ID/Count: Assessed and Discussed  Label Reading: Assessed and Discussed  Heart Healthy Fats: Assessed and Discussed  Menu Planning: Assessed and Discussed  Dining Out: Assessed and Discussed  Physical Activity: Assessed  Medications: Assessed and Discussed  Orals: Assessed and Discussed  Injected Medications: Assessed and Discussed   Storage/Exp:Assessed and Discussed   Site Rotation: Assessed and Discussed   Sites Assessed: yes    Diabetes Disease Process: Assessed and Discussed    Acute Complications: Prevent, Detect, Treat:  Hypoglycemia: Assessed and Discussed  Hyperglycemia: Assessed and Discussed  Sick Days: Needs instruction/review at follow-up and Not addressed  Driving: Needs instruction/review at follow-up and Not addressed    Chronic Complications  Foot Care:Needs instruction/review at follow-up and Not addressed  Skin Care: Needs instruction/review at follow-up and Not addressed  Eye: Needs instruction/review at follow-up and Not addressed  ABC: Needs instruction/review at follow-up and Not addressed  Teeth:Needs instruction/review at follow-up and Not addressed  Goal Setting and Problem Solving: Assessed and Discussed  Barriers: Assessed and Discussed  Psychosocial Adjustments: Assessed and Discussed      Time spent with the patient: 60 minutes for diabetes education and counseling.   Previous Education: yes  Visit Type:DSMT  Hours Remaining: DSMT 1 and MNT 2      Akiko Schuster  4/13/2017

## 2021-06-10 NOTE — PROGRESS NOTES
Received message from Mishel letting me know her mom passed away and would like to to talk with me again in a couple weeks. I will call Mishel back then.      Email:  Spouse: -Karl  Children: 1 Daughter-Fracisco  Employment: [employer]  Smoking, Alcohol, other: None  [facility] Follow up s: Weekly in clinic meetings-   [facility] follow up's: In clinic meetings Weekly  [facility] Enrollment: December 3rd 2013

## 2021-06-10 NOTE — PROGRESS NOTES
Assessment/Plan:     1. Type 2 diabetes mellitus without complication, with long-term current use of insulin  Greatly improved control.  Monitor closely for low sugars.  Follow-up with diabetes education or MTM pharmacist as scheduled.  Follow-up with me in 3 months.  I suspect that her A1c today does not adequately affect improved control over the last few weeks.  - Glycosylated Hemoglobin A1c    2. Pulmonary hypertension, mild  Follow-up scheduled with pulmonary hypertension cardiology clinic in May.  Encouraged follow-up at this visit.  Continue furosemide.      3. Pitting edema  Will obtain CT scan as recommended previously, we review reasons for this imaging.  Further follow-up pending results.  Continue furosemide.    4. Screening mammogram, encounter for  Referral made once again for screening mammogram.  - Mammo Screening Bilateral; Future      Subjective:      Mishel Garza is a 61 y.o. female presented to clinic today for follow-up of her chronic conditions.  In regards to her blood sugars, she is noting significant improvement on metformin and Tresiba 14 units currently.  Morning sugars often in the upper 70s-90s, but denies any significant low sugars or any symptoms.  Lunchtime blood sugar is around 120s and they can be higher in the 180s-200s.  She believes this may be due to her carbohydrate intake.  She is not really monitoring her carbohydrates at this time.  Admits that she still has a poor appetit along with mild stomach discomfort after eating, similar to early satiety she states.  Her bowels are moving regularly.  Her edema is stable.  Remains on furosemide just as needed.  Using it about every 3 days or so.  Notes that her low back is improving with chiropractor and home exercises.  She has an appointment with pulmonary hypertension clinic scheduled in May.    Current Outpatient Prescriptions   Medication Sig Dispense Refill     aspirin 325 MG tablet Take 325 mg by mouth daily.        "atorvastatin (LIPITOR) 10 MG tablet TAKE ONE TABLET BY MOUTH ONE TIME DAILY 90 tablet 4     blood glucose test (CONTOUR NEXT STRIPS) strips Use to test blood sugars four times daily. Dispense brand per patient's insurance at pharmacy discretion. 100 each 11     furosemide (LASIX) 40 MG tablet Take one tablet daily as needed for swelling 30 tablet 3     generic lancets Accu-Chek Multiclix Lancets Miscellaneous  Test BG 4x daily 100 each 11     insulin degludec (TRESIBA FLEXTOUCH U-100) 100 unit/mL (3 mL) InPn Inject 10 Units under the skin daily. (Patient taking differently: Inject 14 Units under the skin daily. ) 9 mL 0     metFORMIN (GLUCOPHAGE) 500 MG tablet TAKE 2 TABLETS (1,000 MG TOTAL) BY MOUTH 2 (TWO) TIMES A DAY WITH MEALS. 120 tablet 3     multivitamin with minerals (THERA-M) 9 mg iron-400 mcg Tab tablet Take 1 tablet by mouth daily.       pen needle, diabetic (BD ULTRA-FINE PRINCESS PEN NEEDLES) 32 gauge x 5/32\" Ndle Use to inject tresiba once daily 100 each 11     triamcinolone (KENALOG) 0.1 % cream as needed.        No current facility-administered medications for this visit.        Past Medical History, Family History, and Social History reviewed.  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  Review of patient's allergies indicates no known allergies.  Family History   Problem Relation Age of Onset     Factor V Leiden deficiency Mother      Social History     Social History     Marital status:      Spouse name: N/A     Number of children: N/A     Years of education: N/A     Occupational History     Not on file.     Social History Main Topics     " "Smoking status: Never Smoker     Smokeless tobacco: Not on file     Alcohol use No     Drug use: No     Sexual activity: Not on file     Other Topics Concern     Not on file     Social History Narrative         Review of systems is as stated in HPI, and the remainder of the 10 system review is otherwise negative.    Objective:     Vitals:    04/25/17 1536   BP: 122/78   Patient Site: Right Arm   Patient Position: Sitting   Cuff Size: Adult Regular   Pulse: 64   Resp: 16   Temp: 98  F (36.7  C)   TempSrc: Oral   Weight: 108 lb (49 kg)   Height: 5' 3.5\" (1.613 m)    Body mass index is 18.83 kg/(m^2).    Alert female.  Mucous membranes moist.  Heart with regular rate and rhythm.  Lungs clear.  Trace edema at ankles.  Abdomen is soft and nontender, no organomegaly or masses.      This note has been dictated using voice recognition software. Any grammatical or context distortions are unintentional and inherent to the the software.     "

## 2021-06-10 NOTE — PROGRESS NOTES
Clinic Care Coordination/Health Care Home follow up in the clinic has been rescheduled for 5/18/17 @ 4:30.      Email:  Spouse: -Karl  Children: 1 Daughter-Fracisco  Employment: [employer]  Smoking, Alcohol, other: None  [facility] Follow up s: Weekly in clinic meetings-   [facility] follow up's: In clinic meetings Weekly  [facility] Enrollment: December 3rd 2013

## 2021-06-10 NOTE — PROGRESS NOTES
Clinic Care Coordination/Health Care Home follow up has been rescheduled for 5/4/17 @ 1:00.    Email:  Spouse: Kenrick  Children: 1 Daughter-Fracisco  Employment: [employer]  Smoking, Alcohol, other: None  [facility] Follow up s: Weekly in clinic meetings-   CCC follow up's: In clinic meetings Weekly  [facility] Enrollment: December 3rd 2013

## 2021-06-11 NOTE — PROGRESS NOTES
Scheduled F/U Call:  Attempt 1    Care Guide called patient.  If this patient is returning our call please transfer to Mari Martin at ext [phone]      PT [facility] to Clinic Care Coordination/Health Care Home follow up on 7/3/17.

## 2021-06-11 NOTE — TELEPHONE ENCOUNTER
RN cannot approve Refill Request    RN can NOT refill this medication Protocol failed and NO refill given. Last office visit: 2/26/2020 Shante Cohen MD Last Physical: 12/11/2017 Last MTM visit: Visit date not found Last visit same specialty: 2/26/2020 Shante Cohen MD.  Next visit within 3 mo: Visit date not found  Next physical within 3 mo: Visit date not found      Robyn Esqueda, Care Connection Triage/Med Refill 9/14/2020    Requested Prescriptions   Pending Prescriptions Disp Refills     metFORMIN (GLUCOPHAGE) 500 MG tablet [Pharmacy Med Name: metFORMIN HCl 500 MG Oral Tablet] 120 tablet 0     Sig: TAKE 2 TABLETS BY MOUTH TWICE DAILY WITH FOOD       Metformin Refill Protocol Failed - 9/13/2020  2:16 PM        Failed - Visit with PCP or prescribing provider visit in last 6 months or next 3 months     Last office visit with prescriber/PCP: Visit date not found OR same dept: 2/26/2020 Shante Cohen MD OR same specialty: 2/26/2020 Shante Cohen MD Last physical: Visit date not found Last MTM visit: Visit date not found         Next appt within 3 mo: Visit date not found  Next physical within 3 mo: Visit date not found  Prescriber OR PCP: Shante Cohen MD  Last diagnosis associated with med order: 1. Type 2 diabetes mellitus without complication, with long-term current use of insulin (H)  - metFORMIN (GLUCOPHAGE) 500 MG tablet [Pharmacy Med Name: metFORMIN HCl 500 MG Oral Tablet]; TAKE 2 TABLETS BY MOUTH TWICE DAILY WITH FOOD  Dispense: 120 tablet; Refill: 0     If protocol passes may refill for 12 months if within 3 months of last provider visit (or a total of 15 months).           Failed - A1C in last 6 months     Hemoglobin A1c   Date Value Ref Range Status   02/26/2020 9.6 (H) 3.5 - 6.0 % Final     Comment:                       Failed - Microalbumin in last year      Microalbumin, Random Urine   Date Value Ref Range Status   03/29/2019 17.62 (H) 0.00 - 1.99 mg/dL Final                   Passed - Blood pressure in last 12 months     BP Readings from Last 1 Encounters:   02/26/20 120/72             Passed - LFT or AST or ALT in last 12 months     Albumin   Date Value Ref Range Status   02/26/2020 4.3 3.5 - 5.0 g/dL Final     Bilirubin, Total   Date Value Ref Range Status   02/26/2020 0.3 0.0 - 1.0 mg/dL Final     Alkaline Phosphatase   Date Value Ref Range Status   02/26/2020 95 45 - 120 U/L Final     AST   Date Value Ref Range Status   02/26/2020 26 0 - 40 U/L Final     ALT   Date Value Ref Range Status   02/26/2020 33 0 - 45 U/L Final     Protein, Total   Date Value Ref Range Status   02/26/2020 6.8 6.0 - 8.0 g/dL Final                Passed - GFR or Serum Creatinine in last 6 months     GFR MDRD Non Af Amer   Date Value Ref Range Status   02/26/2020 >60 >60 mL/min/1.73m2 Final     GFR MDRD Af Amer   Date Value Ref Range Status   02/26/2020 >60 >60 mL/min/1.73m2 Final

## 2021-06-11 NOTE — PROGRESS NOTES
Scheduled F/U Call:  Attempt 1    Care Guide called patient.  If this patient is returning our call please transfer to Mari Martin at ext [phone].

## 2021-06-11 NOTE — PROGRESS NOTES
Scheduled F/U Call: Attempt 2   Care Guide called patient.  If this patient is returning our call please transfer to Mari Martin at ext [phone].

## 2021-06-11 NOTE — PROGRESS NOTES
I spoke with silvino today and she let me know that the next 2 weeks will be really busy and would like me to call her back the last week of the month to make an in clinic Clinic Care Coordination/Health Care Home  follow up appt. I asked silvino if she would like to follow up on her goals over the phone and she let me know she would prefer to come into the clinic.      Email:  Spouse: Kenrick  Children: 1 Daughter-Fracisco  Employment: [email or web address]  Smoking, Alcohol, other: None  [facility] Follow up s: Weekly in clinic meetings-   [facility] follow up's: In clinic meetings Weekly  [facility] Enrollment: December 3rd 2013

## 2021-06-11 NOTE — PROGRESS NOTES
Next Clinic Care Coordination/Health Care Home in clinic follow up will be 7/3/17 @ 4:30.      Email:  Spouse: BismarkKarl  Children: 1 Daughter-Fracisco  Employment: [employer]  Smoking, Alcohol, other: None  [facility] Follow up s: Weekly in clinic meetings-   CCC follow up's: In clinic meetings Weekly  [facility] Enrollment: December 3rd 2013

## 2021-06-12 NOTE — PROGRESS NOTES
Scheduled F/U Call:  Attempt 1    Care Guide called patient.  If this patient is returning our call please transfer to Mari Martin at ext [phone].

## 2021-06-12 NOTE — PROGRESS NOTES
Patient has selected to un-enroll or has transferred to a non-[facility] clinic and no further outreach will be done. I have discussed with the patient s PCP and have removed patient from [facility] enrolled patient list.

## 2021-06-12 NOTE — TELEPHONE ENCOUNTER
RN cannot approve Refill Request    RN can NOT refill this medication Refilled yesterday on 10/16/2020.. Last office visit: 2/26/2020 Shante Cohen MD Last Physical: 12/11/2017 Last MTM visit: Visit date not found Last visit same specialty: 2/26/2020 Shante Cohen MD.  Next visit within 3 mo: Visit date not found  Next physical within 3 mo: Visit date not found      Luciana Morelos, Care Connection Triage/Med Refill 10/17/2020    Requested Prescriptions   Pending Prescriptions Disp Refills     atorvastatin (LIPITOR) 10 MG tablet [Pharmacy Med Name: Atorvastatin Calcium 10 MG Oral Tablet] 60 tablet 0     Sig: Take 1 tablet by mouth once daily       Statins Refill Protocol (Hmg CoA Reductase Inhibitors) Passed - 10/14/2020 12:23 PM        Passed - PCP or prescribing provider visit in past 12 months      Last office visit with prescriber/PCP: 2/26/2020 Shante Cohen MD OR same dept: 2/26/2020 Shante Cohen MD OR same specialty: 2/26/2020 Shante Cohen MD  Last physical: 12/11/2017 Last MTM visit: Visit date not found   Next visit within 3 mo: Visit date not found  Next physical within 3 mo: Visit date not found  Prescriber OR PCP: Shante Cohen MD  Last diagnosis associated with med order: 1. Hyperlipidemia  - atorvastatin (LIPITOR) 10 MG tablet [Pharmacy Med Name: Atorvastatin Calcium 10 MG Oral Tablet]; Take 1 tablet by mouth once daily  Dispense: 60 tablet; Refill: 0    If protocol passes may refill for 12 months if within 3 months of last provider visit (or a total of 15 months).

## 2021-06-12 NOTE — PROGRESS NOTES
Scheduled F/U Call: Attempt 2   Care Guide called patient.  If this patient is returning our call please transfer to Mari Martin at ext [phone].    MRN:      [id number]     Patient name:     Mishel Garza     Care Guide:     Mari     Discuss at Care Conferences:     LIZBETH Huddle 8/23/17 Discuss at September      Barriers:     Pt very difficult to reach. CG has made multiple calls to pt.    Plan Summary:     Try to speak with pt next time she sees Dr Cohen. Leo RN Assessment. Pt is a priority pt.     Action  Due Date Action  Due Date   [facility] RN will:      RN assessment, if pt agrees     Delegations: Action  Due Date Action  Due Date   [facility] SW will:      NA     [facility] Care Guide will:     Try to speak with pt next time she sees Dr Cohen. Leo RN Assessment. Pt is a priority pt.

## 2021-06-12 NOTE — PROGRESS NOTES
Clinic Care Coordination/Health Care Home follow up has been scheduled for 8/10/17 @ 4:30.      Email:  Spouse: Kenrick  Children: 1 Daughter-Fracisco  Employment: [employer]  Smoking, Alcohol, other: None  [facility] Follow up s: Weekly in clinic meetings-   [facility] follow up's: In clinic meetings Weekly  [facility] Enrollment: December 3rd 2013

## 2021-06-12 NOTE — TELEPHONE ENCOUNTER
RN cannot approve Refill Request    RN can NOT refill this medication Protocol failed and NO refill given. Last office visit: 2/26/2020 Shante Cohen MD Last Physical: 12/11/2017 Last MTM visit: Visit date not found Last visit same specialty: 2/26/2020 Shante Cohen MD.  Next visit within 3 mo: Visit date not found  Next physical within 3 mo: Visit date not found      Robyn Esqueda, Care Connection Triage/Med Refill 10/23/2020    Requested Prescriptions   Pending Prescriptions Disp Refills     metFORMIN (GLUCOPHAGE) 500 MG tablet 120 tablet 0     Sig: TAKE 2 TABLETS BY MOUTH TWICE DAILY WITH FOOD       Metformin Refill Protocol Failed - 10/22/2020  4:30 PM        Failed - Visit with PCP or prescribing provider visit in last 6 months or next 3 months     Last office visit with prescriber/PCP: Visit date not found OR same dept: 2/26/2020 Shante Cohen MD OR same specialty: 2/26/2020 Shante Cohen MD Last physical: Visit date not found Last MTM visit: Visit date not found         Next appt within 3 mo: Visit date not found  Next physical within 3 mo: Visit date not found  Prescriber OR PCP: Shante Cohen MD  Last diagnosis associated with med order: 1. Type 2 diabetes mellitus without complication, with long-term current use of insulin (H)  - metFORMIN (GLUCOPHAGE) 500 MG tablet; TAKE 2 TABLETS BY MOUTH TWICE DAILY WITH FOOD  Dispense: 120 tablet; Refill: 0     If protocol passes may refill for 12 months if within 3 months of last provider visit (or a total of 15 months).           Failed - A1C in last 6 months     Hemoglobin A1c   Date Value Ref Range Status   02/26/2020 9.6 (H) 3.5 - 6.0 % Final     Comment:                       Failed - Microalbumin in last year      Microalbumin, Random Urine   Date Value Ref Range Status   03/29/2019 17.62 (H) 0.00 - 1.99 mg/dL Final                  Passed - Blood pressure in last 12 months     BP Readings from Last 1 Encounters:   02/26/20 120/72              Passed - LFT or AST or ALT in last 12 months     Albumin   Date Value Ref Range Status   02/26/2020 4.3 3.5 - 5.0 g/dL Final     Bilirubin, Total   Date Value Ref Range Status   02/26/2020 0.3 0.0 - 1.0 mg/dL Final     Alkaline Phosphatase   Date Value Ref Range Status   02/26/2020 95 45 - 120 U/L Final     AST   Date Value Ref Range Status   02/26/2020 26 0 - 40 U/L Final     ALT   Date Value Ref Range Status   02/26/2020 33 0 - 45 U/L Final     Protein, Total   Date Value Ref Range Status   02/26/2020 6.8 6.0 - 8.0 g/dL Final                Passed - GFR or Serum Creatinine in last 6 months     GFR MDRD Non Af Amer   Date Value Ref Range Status   02/26/2020 >60 >60 mL/min/1.73m2 Final     GFR MDRD Af Amer   Date Value Ref Range Status   02/26/2020 >60 >60 mL/min/1.73m2 Final

## 2021-06-12 NOTE — TELEPHONE ENCOUNTER
Refill Approved    Rx renewed per Medication Renewal Policy. Medication was last renewed on 02/03/2020.  Last office visit was 02/26/2020 with PCP.    Luciana Morelos, Care Connection Triage/Med Refill 10/16/2020     Requested Prescriptions   Pending Prescriptions Disp Refills     atorvastatin (LIPITOR) 10 MG tablet 90 tablet 1     Sig: Take 1 tablet (10 mg total) by mouth daily.       Statins Refill Protocol (Hmg CoA Reductase Inhibitors) Passed - 10/14/2020  9:02 AM        Passed - PCP or prescribing provider visit in past 12 months      Last office visit with prescriber/PCP: 2/26/2020 Shante Cohen MD OR same dept: 2/26/2020 Shante Cohen MD OR same specialty: 2/26/2020 Shante Cohen MD  Last physical: 12/11/2017 Last MTM visit: Visit date not found   Next visit within 3 mo: Visit date not found  Next physical within 3 mo: Visit date not found  Prescriber OR PCP: Shante Cohen MD  Last diagnosis associated with med order: 1. Hyperlipidemia  - atorvastatin (LIPITOR) 10 MG tablet; Take 1 tablet (10 mg total) by mouth daily.  Dispense: 90 tablet; Refill: 1    If protocol passes may refill for 12 months if within 3 months of last provider visit (or a total of 15 months).

## 2021-06-12 NOTE — PROGRESS NOTES
Scheduled F/U Call:  Attempt 1    Care Guide called patient.  If this patient is returning our call please transfer to Mari Martin at ext [phone].

## 2021-06-12 NOTE — PROGRESS NOTES
MRN:    [id number]    Patient name:   Mishel Garza    Care Guide:   Mari    Discuss at Care Conferences:   POD Huddle 8/23/17 Discuss at September     Barriers:   Pt very difficult to reach. CG has made multiple calls to pt.    Plan Summary:   Try to speak with pt next time she sees Dr Cohen. Offer RN Assessment. Pt is a priority pt.    Action  Due Date Action  Due Date   [facility] RN will:    RN assessment, if pt agrees    Delegations: Action  Due Date Action  Due Date   [facility] SW will:    NA    [facility] Care Guide will:   Try to speak with pt next time she sees Dr Cohen. Leo RN Assessment. Pt is a priority pt.

## 2021-06-13 NOTE — TELEPHONE ENCOUNTER
Received refill request from [facility] for Metformin.  Patient was last seen November 2020, therefore we will send a 1 year supply.

## 2021-06-13 NOTE — TELEPHONE ENCOUNTER
Refill Approved    Rx renewed per Medication Renewal Policy. Medication was last renewed on 5/29/2020 with 1 refill. Change in pharmacy noted.   Last office visit: 11/18/2020 with PCP Dr SAMUEL Rey, Care Connection Triage/Med Refill 12/20/2020     Requested Prescriptions   Pending Prescriptions Disp Refills     potassium chloride (K-DUR,KLOR-CON) 20 MEQ tablet [Pharmacy Med Name: Potassium Chloride Arlette ER 20 MEQ Oral Tablet Extended Release] 60 tablet 0     Sig: Take 1 tablet by mouth twice daily       Potassium Supplements Refill Protocol Passed - 12/17/2020 10:15 AM        Passed - PCP or prescribing provider visit in past 12 months       Last office visit with prescriber/PCP: 11/18/2020 Shante Cohen MD OR same dept: 11/18/2020 Shante Cohen MD OR same specialty: 11/18/2020 Shante Cohen MD  Last physical: 12/11/2017 Last MTM visit: Visit date not found   Next visit within 3 mo: Visit date not found  Next physical within 3 mo: Visit date not found  Prescriber OR PCP: Shante Cohen MD  Last diagnosis associated with med order: 1. Hypokalemia  - potassium chloride (K-DUR,KLOR-CON) 20 MEQ tablet [Pharmacy Med Name: Potassium Chloride Arlette ER 20 MEQ Oral Tablet Extended Release]; Take 1 tablet by mouth twice daily  Dispense: 60 tablet; Refill: 0    If protocol passes may refill for 12 months if within 3 months of last provider visit (or a total of 15 months).             Passed - Potassium level in last 12 months     Lab Results   Component Value Date    Potassium 4.9 02/26/2020

## 2021-06-13 NOTE — PROGRESS NOTES
Assessment/Plan:     1. Type 2 diabetes mellitus with diabetic polyneuropathy, with long-term current use of insulin (H)  Inadequate control, despite excellent morning sugars.  Seems to most likely be mealtime and postmeal that we are missing.  I feel she would benefit from a continuous glucose monitor, especially given significantly normal to low sugars in the morning.  Continue same dose of Tresiba and continue Metformin.  May require mealtime insulin versus potentially reduction in Tresiba and initiation of glipizide to see if that helps with meal coverage.  Referral placed to diabetes education.  Encouraged regular exercise.  Will check urine microalbumin today.  - Glycosylated Hemoglobin A1c; Standing  - Microalbumin, Random Urine  - Glycosylated Hemoglobin A1c  - Ambulatory referral to Diabetes Education Program (CDE)    2. Pulmonary hypertension, mild (H)  This remains asymptomatic, she remains euvolemic.  Continue furosemide and regular exercise.  Follow-up echocardiogram.    3. Tricuspid valve regurgitation, nonrheumatic  She is overdue for an echocardiogram to reassess.  This remains asymptomatic currently.  - Echo Complete; Future    4. Asymptomatic postmenopausal status  Placed for screening bone density scan.  - DXA Bone Density Scan; Future      There are no Patient Instructions on file for this visit.     Return in about 3 months (around 2/18/2021) for Annual Preventive Care Visit and diabetes follow-up.      Subjective:      Mishel Garza is a 65 y.o. female resenting to clinic today for follow-up of type 2 diabetes.  Morning sugars remain under good control mostly in the 90s 100s range.  On rare occasions will see a low, less than 70, but irregular.  Today it was 162 which is quite abnormal for her.  Variation in daytime sugars, not checking as routinely.  Often will see 150 before lunch.  Monitoring carbohydrates and on review of diet is eating very well.  Reasonable snacks including  "primarily proteins, does admit sometimes snacks or pretzels or popcorn.  Admits she has been struggling to get exercise.  Denies any shortness of breath, chest pain, or edema.  Prior history of pulmonary hypertension and tricuspid regurgitation, that has been stable.  Frustrated that her weight has gone up but does not feel it is due to fluid retention.  Remains on atorvastatin management of dyslipidemia.  She is up-to-date with eye exam.  She is agreeable to PPSV23.  She is agreeable to bone density scan.    Current Outpatient Medications   Medication Sig Dispense Refill     aspirin 325 MG tablet Take 325 mg by mouth daily.       atorvastatin (LIPITOR) 10 MG tablet Take 1 tablet (10 mg total) by mouth daily. 90 tablet 1     blood glucose test (CONTOUR NEXT TEST STRIPS) strips Use to test blood sugars four times daily. Dispense brand per patient's insurance at pharmacy discretion. 100 strip 11     flash glucose scanning reader ([employer] ELLYN 14 DAY READER) Misc Use 1 Units As Directed daily. Use reader to scan glucose  sensor every 8 hours 1 each 0     generic lancets Accu-Chek Multiclix Lancets Miscellaneous  Test BG 4x daily 100 each 11     KLOR-CON M20 20 mEq tablet TAKE 1 TABLET BY MOUTH 2 TIMES A  tablet 1     lancets (ONETOUCH DELICA LANCETS) 33 gauge Misc 1 each by In Vitro route 4 (four) times a day. 100 each 3     metFORMIN (GLUCOPHAGE) 500 MG tablet TAKE 2 TABLETS BY MOUTH TWICE DAILY WITH FOOD 120 tablet 0     multivitamin (MULTIPLE VITAMINS ORAL) Take by mouth. Centrum silver       ONETOUCH VERIO strips USE 1 EACH AS DIRECTED 4 (FOUR) TIMES A DAY. 100 strip 3     pen needle, diabetic (BD ULTRA-FINE PRINCESS PEN NEEDLE) 32 gauge x 5/32\" Ndle USE TO INJECT TRESIBA ONCE DAILY 100 each 11     TRESIBA FLEXTOUCH U-100 100 unit/mL (3 mL) InPn INJECT 14 UNITS UNDER SKIN DAILY.INCREASE BY 2 UNITS EVERY 3 DS AS NEEDED UNTIL AT GOAL OR 18 UNITS 15 Syringe 3     No current facility-administered medications for " this visit.        Past Medical History, Family History, and Social History reviewed.  Past Medical History:   Diagnosis Date     Factor V Leiden Mutation     Created by [employer] Annotation: May 19 2008 10:15AM Ciara Alvarado:  heterozygous;      High cholesterol      Pulmonary hypertension (H)     mild     Tricuspid valve regurgitation, nonrheumatic 3/3/2017    Likely secondary      Type 2 diabetes mellitus (H)     Created by Conversion      No past surgical history on file.  Patient has no known allergies.  Family History   Problem Relation Age of Onset     Factor V Leiden deficiency Mother      Social History     Socioeconomic History     Marital status:      Spouse name: Not on file     Number of children: Not on file     Years of education: Not on file     Highest education level: Not on file   Occupational History     Not on file   Social Needs     Financial resource strain: Not on file     Food insecurity     Worry: Not on file     Inability: Not on file     Transportation needs     Medical: Not on file     Non-medical: Not on file   Tobacco Use     Smoking status: Never Smoker     Smokeless tobacco: Never Used   Substance and Sexual Activity     Alcohol use: No     Drug use: No     Sexual activity: Not on file   Lifestyle     Physical activity     Days per week: Not on file     Minutes per session: Not on file     Stress: Not on file   Relationships     Social connections     Talks on phone: Not on file     Gets together: Not on file     Attends Sikh service: Not on file     Active member of club or organization: Not on file     Attends meetings of clubs or organizations: Not on file     Relationship status: Not on file     Intimate partner violence     Fear of current or ex partner: Not on file     Emotionally abused: Not on file     Physically abused: Not on file     Forced sexual activity: Not on file   Other Topics Concern     Not on file   Social History Narrative     Not  "on file         Review of systems is as stated in HPI, and the remainder of the 10 system review is otherwise negative.    Objective:     Vitals:    11/18/20 0713   BP: 128/76   Pulse: 81   Resp: 16   Temp: 98.3  F (36.8  C)   TempSrc: Oral   SpO2: 97%   Weight: 157 lb (71.2 kg)   Height: 5' 3\" (1.6 m)    Body mass index is 27.81 kg/m .    Alert female.  Mucous membranes moist.  Heart with regular rate and rhythm.  Lungs clear.  Extremities without edema.    Diabetic foot exam: normal DP and PT pulses, no trophic changes or ulcerative lesions and normal sensory exam        This note has been dictated using voice recognition software. Any grammatical or context distortions are unintentional and inherent to the the software.   "

## 2021-06-13 NOTE — PROGRESS NOTES
Mishel is here alone today to talk about getting a continuous glucose monitoring study done.    She is currently taking Metformin 1000mg two times a day and 18 units of Tresiba before bed nightly.    She checks her blood sugar 1-3 times daily.  She has been having trouble with bedtime readings in the 200's and waking up below 80 several times a month.  Stated she knows when her blood sugar will be low as she wakes sweaty and with blurred vision.    Reviewed how sensor works, checking interstitial glucose every five minutes.  Reviewed how information helps us make better treatment decisions.  Reviewed keeping food logs and continuing to check glucose as usual.  All additional questions and concerns addressed.  Sensor placed on upper left arm.  No additional adhesive used.  Lot number-[id number]  Serial number-[id number]  Follow-up appointment scheduled for 12.08.2020  Discussed calling if sensor falls off less than 5 days after placement.    DSMT 30 minutes  Sara Schuster RN, [facility]  [phone]  In Clinic Tuesday, Wednesday, Thursday

## 2021-06-14 NOTE — PROGRESS NOTES
Assessment/Plan:     1. Routine general medical examination at a health care facility  Encouraged healthy lifestyle habits including regular exercise, healthy eating habits, and adequate calcium and vitamin D intake.  Orders placed for colon cancer screening and mammogram.  Will await screening Pap smear results.  Influenza vaccine administered today.  Encourage consideration of shingles vaccine in the future.   - Gynecologic Cytology (PAP Smear)    2. Need for vaccination  - Influenza, Seasonal,Quad Inj, 36+ MOS    3. Screen for colon cancer  - Ambulatory referral for Colonoscopy    4. Screening mammogram, encounter for  - Mammo Screening Bilateral; Future    5. Type 2 diabetes mellitus without complication, with long-term current use of insulin  Inadequate control though improved.  I will have her increase to receive a pack to 12 units daily, increase every 3 days by 2 units up to either 18 units or morning blood sugar less than 130, notify if low sugars.  Referral placed to diabetes education.  She is on aspirin and a statin medication, blood pressure controlled, does not smoke, and is otherwise meeting goals of care.  Encouraged her to schedule yearly eye exam.  - Glycosylated Hemoglobin A1c; Standing  - Glycosylated Hemoglobin A1c  - Comprehensive Metabolic Panel  - Lipid Cascade FASTING    6. Hypercholesterolemia  Will obtain fasting lipid cascade today.    7. Tricuspid valve regurgitation, nonrheumatic  Requiring further intervention    8. Pulmonary hypertension, mild  Not felt to be clinically significant by cardiology, will follow up with cardiology as scheduled.    9. Factor V Leiden Mutation  She remains on aspirin and takes measures to reduce risk of pulmonary embolism.        Subjective:     Mishel Garza is a 62 y.o. female who presents for an annual exam.  Doing significantly better than last May saw her.  In particular, her back pain has resolved through meetings with a chiropractor and  massage therapist.  Feels like her moods have improved significantly with resolution of pain.  She is excited about this.  Her edema has resolved, is now taking furosemide just as needed and has not taken it for several weeks now if not longer.  Met with Dr. Wilcox of cardiology in June, he did not feel that her dyspnea and chest pain were cardiac in nature and they have since resolved that she is improved her stress management at work.  He also felt like pulmonary hypertension could be monitored.  Regards her diabetes, her prescription for to receive us to 10 units so she reduced her dose down to 10 units.  She has not been checking her sugars.  Denies any low sugars.  She has not been doing any regular exercise.    She is .  Her daughter is doing well.  She does not smoke.    Healthy Habits:   Healthy Diet: Yes  Regular Exercise: No  Sunscreen Use: Yes  Dental Visits Regularly: Yes  Seat Belt: Yes  Domestic abuse:   No    Health Maintenance reviewed:  Lipid Profile: due today  Glucose Screen: A1C due today  Colonoscopy: due  Mammogram: due    Gynecologic History  Patient's last menstrual period was 12/07/2012.  Contraception: post menopausal status  Last Pap: 10/2012. Results were: normal, HPV neg        Immunization History   Administered Date(s) Administered     DT (pediatric) 02/14/2001     Hep A, historic 05/19/2008, 11/24/2008     Influenza, seasonal,quad inj 36+ mos 10/22/2015, 12/11/2017     Influenza, seasonal,quad inj 6-35 mos 10/11/2014     Influenza,seasonal quad, PF 12/03/2013     Pneumo Polysac 23-V 11/14/2013     Td,adult,historic,unspecified 05/19/2008     Tdap 05/19/2008     ZOSTER 12/27/2013     Immunization status: up to date and documented.    Current Outpatient Prescriptions   Medication Sig Dispense Refill     aspirin 325 MG tablet Take 325 mg by mouth daily.       atorvastatin (LIPITOR) 10 MG tablet TAKE ONE TABLET BY MOUTH ONE TIME DAILY 90 tablet 4     blood glucose test (CONTOUR  "NEXT STRIPS) strips Use to test blood sugars four times daily. Dispense brand per patient's insurance at pharmacy discretion. 100 each 11     furosemide (LASIX) 40 MG tablet Take one tablet daily as needed for swelling 30 tablet 3     generic lancets Accu-Chek Multiclix Lancets Miscellaneous  Test BG 4x daily 100 each 11     metFORMIN (GLUCOPHAGE) 500 MG tablet Take 2 tablets (1,000 mg total) by mouth 2 (two) times a day with meals. 360 tablet 0     MULTIVIT-MIN/IRON/FOLIC/LUTEIN (CENTRUM SILVER WOMEN ORAL) Take by mouth daily.       pen needle, diabetic ([employer] ULTRA-FINE PRINCESS PEN NEEDLES) 32 gauge x 5/32\" Ndle Use to inject tresiba once daily 100 each 11     TRESIBA FLEXTOUCH U-100 100 unit/mL (3 mL) InPn INJECT 10 UNITS UNDER THE SKIN DAILY. 15 Syringe 1     multivitamin with minerals (THERA-M) 9 mg iron-400 mcg Tab tablet Take 1 tablet by mouth daily.       triamcinolone (KENALOG) 0.1 % cream as needed.        No current facility-administered medications for this visit.      Past Medical History:   Diagnosis Date     Factor V Leiden Mutation     Created by [employer] [facility] Annotation: May 19 2008 10:15Ciara Carrillo:  heterozygous;      High cholesterol      Pulmonary hypertension     mild     Tricuspid valve regurgitation, nonrheumatic 3/3/2017    Likely secondary      Type 2 diabetes mellitus     Created by Conversion      No past surgical history on file.  Review of patient's allergies indicates no known allergies.  Family History   Problem Relation Age of Onset     Factor V Leiden deficiency Mother      Social History     Social History     Marital status:      Spouse name: N/A     Number of children: N/A     Years of education: N/A     Occupational History     Not on file.     Social History Main Topics     Smoking status: Never Smoker     Smokeless tobacco: Not on file     Alcohol use No     Drug use: No     Sexual activity: Not on file     Other Topics Concern     Not on file     Social " "History Narrative       Review of Systems  General:  Denies problem  Eyes: Denies problem  Ears/Nose/Throat: Denies problem  Cardiovascular: Denies problem  Respiratory:  Denies problem  Gastrointestinal:  Denies problem   Genitourinary: Denies problem  Musculoskeletal:  Denies problem  Skin: Denies problem  Neurologic: Denies problem  Psychiatric: Denies problem  Endocrine: Denies problem  Heme/Lymphatic: Denies problem   Allergic/Immunologic: Denies problem            Objective:        Vitals:    12/11/17 0910   BP: 122/80   Pulse: 84   Resp: 20   Temp: 97.6  F (36.4  C)   TempSrc: Oral   SpO2: 97%   Weight: 114 lb 11.2 oz (52 kg)   Height: 5' 2.5\" (1.588 m)     Body mass index is 20.64 kg/(m^2).    Physical Exam:  General Appearance: Alert, pleasant, appears stated age  Head: Normocephalic, without obvious abnormality  Eyes: PERRL, conjunctiva/corneas clear, EOM's intact  Ears: Normal TM's and external ear canals, both ears  Nose: Nares normal, septum midline,mucosa normal, no drainage  Throat: Lips, mucosa, and tongue normal; teeth and gums normal; oropharynx is clear  Neck: Supple,without lymphadenopathy or thyromegally  Lungs: Clear to auscultation bilaterally, respirations unlabored  Heart: Regular rate and rhythm, no murmur   Breast:  Normal appearance.  No palpable masses, nipple discharge, or skin changes  Abdomen: Soft, non-tender, no masses, no organomegaly  Extremities: Extremities with strong and symmetric pulses, no cyanosis or edema  Skin: Skin color, texture normal, no rashes or lesions  Neurologic: Normal   Pelvic exam: Normal external female genitalia.  Vaginal and cervical mucosa within normal limits on speculum exam.  Surepap obtained.                This note has been dictated using voice recognition software. Any grammatical or context distortions are unintentional and inherent to the the software.    "

## 2021-06-14 NOTE — PROGRESS NOTES
Assessment/Plan:     1. Type 2 diabetes mellitus with diabetic polyneuropathy, with long-term current use of insulin (H)  Encouraged in regards to healthy lifestyle habits.  Encouraged scheduling of regular exercise.  Continue to work to refine diet further given recent indiscretions.  Reviewed medication options including SGL 2 inhibitors, GLP-1 inhibitors, and mealtime insulin.  Would like to begin with SGLT2 inhibitor.  Prescription provided for canagliflozin.  Discussed common side effects.  We will plan to check renal function in 4 weeks at follow-up visit.  Notify with onset of additional symptoms.  - canagliflozin (INVOKANA) 100 mg Tab; Take 1 tablet (100 mg total) by mouth daily before breakfast. Before the 1st meal of the day  Dispense: 90 tablet; Refill: 1    2. Visit for screening mammogram  - Mammo Screening Bilateral; Future      There are no Patient Instructions on file for this visit.     Return in about 4 weeks (around 2/15/2021) for Follow-up diabetes.      Subjective:      Mishel Garza is a 65 y.o. female had any clinic today for follow-up of type 2 diabetes.  At last visit we noted A1c was high despite normal fasting blood sugars on Tresiba and Metformin, referral made to diabetes education who was able to identify post prandial hyperglycemia.  At that time patient was not ready to initiate new medication, here today to discuss medications.  Admits that she has been exercising less due to winter and fear of slipping on ice, has had significant dietary indiscretion related to the holidays.  Denies low blood sugars.    Current Outpatient Medications   Medication Sig Dispense Refill     aspirin 325 MG tablet Take 325 mg by mouth daily.       atorvastatin (LIPITOR) 10 MG tablet Take 1 tablet (10 mg total) by mouth daily. 90 tablet 1     blood glucose test (CONTOUR NEXT TEST STRIPS) strips Use to test blood sugars four times daily. Dispense brand per patient's insurance at pharmacy discretion.  "100 strip 11     flash glucose scanning reader ([employer] ELLYN 14 DAY READER) Misc Use 1 Units As Directed daily. Use reader to scan glucose  sensor every 8 hours 1 each 0     generic lancets Accu-Chek Multiclix Lancets Miscellaneous  Test BG 4x daily 100 each 11     lancets (ONETOUCH DELICA LANCETS) 33 gauge Misc 1 each by In Vitro route 4 (four) times a day. 100 each 3     metFORMIN (GLUCOPHAGE) 500 MG tablet TAKE 2 TABLETS BY MOUTH TWICE DAILY WITH FOOD 360 tablet 3     multivitamin (MULTIPLE VITAMINS ORAL) Take by mouth. Centrum silver       ONETOUCH VERIO strips USE 1 EACH AS DIRECTED 4 (FOUR) TIMES A DAY. 100 strip 3     pen needle, diabetic (BD ULTRA-FINE PRINCESS PEN NEEDLE) 32 gauge x 5/32\" Ndle USE TO INJECT TRESIBA ONCE DAILY 100 each 11     potassium chloride (K-DUR,KLOR-CON) 20 MEQ tablet Take 1 tablet (20 mEq total) by mouth 2 (two) times a day. 180 tablet 3     TRESIBA FLEXTOUCH U-100 100 unit/mL (3 mL) InPn INJECT 14 UNITS UNDER SKIN DAILY.INCREASE BY 2 UNITS EVERY 3 DS AS NEEDED UNTIL AT GOAL OR 18 UNITS (Patient taking differently: 18 Units. ) 15 Syringe 3     canagliflozin (INVOKANA) 100 mg Tab Take 1 tablet (100 mg total) by mouth daily before breakfast. Before the 1st meal of the day 90 tablet 1     No current facility-administered medications for this visit.        Past Medical History, Family History, and Social History reviewed.  Past Medical History:   Diagnosis Date     Factor V Leiden Mutation     Created by [employer] [facility] Annotation: May 19 2008 10:15Ciara Carrillo:  heterozygous;      High cholesterol      Pulmonary hypertension (H)     mild     Tricuspid valve regurgitation, nonrheumatic 3/3/2017    Likely secondary      Type 2 diabetes mellitus (H)     Created by Conversion      History reviewed. No pertinent surgical history.  Patient has no known allergies.  Family History   Problem Relation Age of Onset     Factor V Leiden deficiency Mother      Social History " "    Socioeconomic History     Marital status:      Spouse name: Not on file     Number of children: Not on file     Years of education: Not on file     Highest education level: Not on file   Occupational History     Not on file   Social Needs     Financial resource strain: Not on file     Food insecurity     Worry: Not on file     Inability: Not on file     Transportation needs     Medical: Not on file     Non-medical: Not on file   Tobacco Use     Smoking status: Never Smoker     Smokeless tobacco: Never Used   Substance and Sexual Activity     Alcohol use: No     Drug use: No     Sexual activity: Not on file   Lifestyle     Physical activity     Days per week: Not on file     Minutes per session: Not on file     Stress: Not on file   Relationships     Social connections     Talks on phone: Not on file     Gets together: Not on file     Attends Mandaeism service: Not on file     Active member of club or organization: Not on file     Attends meetings of clubs or organizations: Not on file     Relationship status: Not on file     Intimate partner violence     Fear of current or ex partner: Not on file     Emotionally abused: Not on file     Physically abused: Not on file     Forced sexual activity: Not on file   Other Topics Concern     Not on file   Social History Narrative     Not on file         Review of systems is as stated in HPI, and the remainder of the 10 system review is otherwise negative.    Objective:     Vitals:    01/18/21 1354   BP: 122/78   Pulse: 85   Resp: 20   Temp: 97.8  F (36.6  C)   TempSrc: Oral   SpO2: 98%   Weight: 160 lb (72.6 kg)   Height: 5' 3\" (1.6 m)    Body mass index is 28.34 kg/m .    Female.  Mucous membranes moist.  Appropriate affect.  No edema.      This note has been dictated using voice recognition software. Any grammatical or context distortions are unintentional and inherent to the the software.   "

## 2021-06-15 PROBLEM — I27.20 PULMONARY HYPERTENSION, MILD (H): Status: ACTIVE | Noted: 2017-03-03

## 2021-06-15 PROBLEM — I36.1 TRICUSPID VALVE REGURGITATION, NONRHEUMATIC: Status: ACTIVE | Noted: 2017-03-03

## 2021-06-15 NOTE — PATIENT INSTRUCTIONS - HE
Your A1c has improved slightly.  Continue Invokana at current dose.  Reduce Tresiba to 10 units.  Update me via [employer] with your blood sugars in about 2 weeks.    I have sent in a prescription for fluconazole to use if you have persisting vaginal itching that would be suggestive of yeast infection.    Your yearly eye exam will be due June 23, please schedule this if you have not yet done so.

## 2021-06-15 NOTE — PROGRESS NOTES
Assessment/Plan:     1. Type 2 diabetes mellitus with diabetic polyneuropathy, with long-term current use of insulin (H)  Controlled for the most part based on daily sugars it sounds like it is in a good place, suspect A1c remains significantly elevated at least in part due to more recent adjustment in medications along with dietary indiscretion last week.  We discussed options.  Because she is needing to hold her Tresiba at times out of fear of low sugars, will reduce her Tresiba from 14 to 10 units.  Continue Invokana at current dose.  At this time she is not interested in increasing further anything that is reasonable for now.  Continue Metformin.  She will update me with her blood sugars in 2 weeks via [employer] to further titrate Tresiba as needed.  Follow-up with me in clinic in 3 months.  Will check comprehensive metabolic panel to assess liver function in the setting of Metformin and to assess renal function after initiation of Invokana.  Will obtain fasting lipids today.  Because she is intermittently having vaginal itching, I will give her prescription for fluconazole to have on hand.  - Comprehensive Metabolic Panel  - Lipid Cascade FASTING  - Glycosylated Hemoglobin A1c  - fluconazole (DIFLUCAN) 150 MG tablet; Take one tablet at onset of yeast infection symptoms.  May repeat in 3 days if needed.  Dispense: 2 tablet; Refill: 1    2. Pulmonary hypertension, mild (H)  Currently in good control without active symptoms.      Patient Instructions   Your A1c has improved slightly.  Continue Invokana at current dose.  Reduce Tresiba to 10 units.  Update me via [employer] with your blood sugars in about 2 weeks.    I have sent in a prescription for fluconazole to use if you have persisting vaginal itching that would be suggestive of yeast infection.    Your yearly eye exam will be due June 23, please schedule this if you have not yet done so.       Return in about 3 months (around 5/24/2021) for Annual Wellness  Visit and diabetes follow-up.      Subjective:      Mishel Garza is a 65 y.o. female presented to clinic today for follow-up of type 2 diabetes.  At last visit we had initiated Invokana 100 mg daily and she is tolerating well.  Noting intermittent vaginal itching, its not typically persisting but fluctuates.  She is nervous about the increased risk of amputation.  Notes that with taking the medications her sugars are lower to the extent that she sometimes is holding her Tresiba at bedtime, usually taking 14 units but sometimes taking none.  Morning sugars typically in the 80-1 10 range without anything less than 80.  Midday sugars usually in the 130s to 140s.  Evening sugars in the 170s to 190s.  If her blood sugar is less than 150 in the evening she will usually eat something.  If it is less than 180, she will hold her Tresiba.  Admits that last week she had rather significant dietary indiscretion when she was feeling more down.  She was able to self motivate to get back on track.  Noting persistent tingling in toes.  Denies any chest pain, shortness of breath.  Doing some exercise with weights at times but has not been walking regularly.    Current Outpatient Medications   Medication Sig Dispense Refill     aspirin 325 MG tablet Take 325 mg by mouth daily.       atorvastatin (LIPITOR) 10 MG tablet Take 1 tablet (10 mg total) by mouth daily. 90 tablet 1     blood glucose test (CONTOUR NEXT TEST STRIPS) strips Use to test blood sugars four times daily. Dispense brand per patient's insurance at pharmacy discretion. 100 strip 11     canagliflozin (INVOKANA) 100 mg Tab Take 1 tablet (100 mg total) by mouth daily before breakfast. Before the 1st meal of the day 90 tablet 1     flash glucose scanning reader (Click Quote SaveSTYLE ELLYN 14 DAY READER) Misc Use 1 Units As Directed daily. Use reader to scan glucose  sensor every 8 hours 1 each 0     generic lancets Accu-Chek Multiclix Lancets Miscellaneous  Test BG 4x daily 100  "each 11     lancets (ONETOUCH DELICA LANCETS) 33 gauge Misc 1 each by In Vitro route 4 (four) times a day. 100 each 3     metFORMIN (GLUCOPHAGE) 500 MG tablet TAKE 2 TABLETS BY MOUTH TWICE DAILY WITH FOOD 360 tablet 3     multivitamin (MULTIPLE VITAMINS ORAL) Take by mouth. Centrum silver       ONETOUCH VERIO strips USE 1 EACH AS DIRECTED 4 (FOUR) TIMES A DAY. 100 strip 3     pen needle, diabetic (BD ULTRA-FINE PRINCESS PEN NEEDLE) 32 gauge x 5/32\" Ndle USE TO INJECT TRESIBA ONCE DAILY 100 each 11     potassium chloride (K-DUR,KLOR-CON) 20 MEQ tablet Take 1 tablet (20 mEq total) by mouth 2 (two) times a day. 180 tablet 3     TRESIBA FLEXTOUCH U-100 100 unit/mL (3 mL) InPn INJECT 14 UNITS UNDER SKIN DAILY.INCREASE BY 2 UNITS EVERY 3 DS AS NEEDED UNTIL AT GOAL OR 18 UNITS (Patient taking differently: 18 Units. ) 15 Syringe 3     fluconazole (DIFLUCAN) 150 MG tablet Take one tablet at onset of yeast infection symptoms.  May repeat in 3 days if needed. 2 tablet 1     No current facility-administered medications for this visit.        Past Medical History, Family History, and Social History reviewed.  Past Medical History:   Diagnosis Date     Factor V Leiden Mutation     Created by [facility] Annotation: May 19 2008 10:15Ciara Carrillo:  heterozygous;      High cholesterol      Pulmonary hypertension (H)     mild     Tricuspid valve regurgitation, nonrheumatic 3/3/2017    Likely secondary      Type 2 diabetes mellitus (H)     Created by Conversion      History reviewed. No pertinent surgical history.  Patient has no known allergies.  Family History   Problem Relation Age of Onset     Factor V Leiden deficiency Mother      Social History     Socioeconomic History     Marital status:      Spouse name: Not on file     Number of children: Not on file     Years of education: Not on file     Highest education level: Not on file   Occupational History     Not on file   Social Needs     Financial " "resource strain: Not on file     Food insecurity     Worry: Not on file     Inability: Not on file     Transportation needs     Medical: Not on file     Non-medical: Not on file   Tobacco Use     Smoking status: Never Smoker     Smokeless tobacco: Never Used   Substance and Sexual Activity     Alcohol use: No     Drug use: No     Sexual activity: Not on file   Lifestyle     Physical activity     Days per week: Not on file     Minutes per session: Not on file     Stress: Not on file   Relationships     Social connections     Talks on phone: Not on file     Gets together: Not on file     Attends Christianity service: Not on file     Active member of club or organization: Not on file     Attends meetings of clubs or organizations: Not on file     Relationship status: Not on file     Intimate partner violence     Fear of current or ex partner: Not on file     Emotionally abused: Not on file     Physically abused: Not on file     Forced sexual activity: Not on file   Other Topics Concern     Not on file   Social History Narrative     Not on file         Review of systems is as stated in HPI, and the remainder of the 10 system review is otherwise negative.    Objective:     Vitals:    02/24/21 0708   BP: 116/68   Pulse: 88   Resp: 16   Temp: 97.8  F (36.6  C)   TempSrc: Oral   SpO2: 98%   Weight: 157 lb (71.2 kg)   Height: 5' 3\" (1.6 m)    Body mass index is 27.81 kg/m .    Alert female.  Affect appropriate.  Mucous membranes moist.  Heart with regular rate and rhythm.  Lungs clear.  Extremities without edema.  Diabetic foot exam: normal DP and PT pulses, no trophic changes or ulcerative lesions and normal sensory exam     Office Visit on 02/24/2021   Component Date Value Ref Range Status     Hemoglobin A1c 02/24/2021 10.0* <=5.6 % Final            This note has been dictated using voice recognition software. Any grammatical or context distortions are unintentional and inherent to the the software.   "

## 2021-06-16 NOTE — TELEPHONE ENCOUNTER
Refill Approved    Rx renewed per Medication Renewal Policy. Medication was last renewed on 10/16/20.    Dennys Red, [facility] Triage/Med Refill 4/20/2021     Requested Prescriptions   Pending Prescriptions Disp Refills     atorvastatin (LIPITOR) 10 MG tablet [Pharmacy Med Name: Atorvastatin Calcium 10 MG Oral Tablet] 90 tablet 0     Sig: Take 1 tablet by mouth once daily       Statins Refill Protocol (Hmg CoA Reductase Inhibitors) Passed - 4/20/2021  9:04 AM        Passed - PCP or prescribing provider visit in past 12 months      Last office visit with prescriber/PCP: 2/24/2021 Shante Cohen MD OR same dept: 2/24/2021 Shante Cohen MD OR same specialty: 2/24/2021 Shante Cohen MD  Last physical: 12/11/2017 Last MTM visit: Visit date not found   Next visit within 3 mo: Visit date not found  Next physical within 3 mo: Visit date not found  Prescriber OR PCP: Shante Cohen MD  Last diagnosis associated with med order: 1. Hyperlipidemia  - atorvastatin (LIPITOR) 10 MG tablet [Pharmacy Med Name: Atorvastatin Calcium 10 MG Oral Tablet]; Take 1 tablet by mouth once daily  Dispense: 90 tablet; Refill: 0    If protocol passes may refill for 12 months if within 3 months of last provider visit (or a total of 15 months).

## 2021-06-20 NOTE — LETTER
Letter by Shante Moreno MD at      Author: Shante Moreno MD Service: -- Author Type: --    Filed:  Encounter Date: 9/18/2020 Status: (Other)         Mishel Garza  [id number]  [place] [address]      September 18, 2020      Dear Mishel Garza,    Per our refill protocol, you are due for a medication check office visit. Your prescription for METFORMIN was sent to your pharmacy (09.15.2020). Please call [phone] to schedule an DIABETES FOLLOW UP /LAB visit with DR MORENO OR GLADIS GALARZA before your next refill is needed to avoid delays.    Thank you,  [facility]

## 2021-06-22 NOTE — TELEPHONE ENCOUNTER
RN cannot approve Refill Request    RN can NOT refill this medication overdue for labs.    Miguel Angel Limon, [facility] Triage/Med Refill 1/3/2019    Requested Prescriptions   Pending Prescriptions Disp Refills     metFORMIN (GLUCOPHAGE) 500 MG tablet [Pharmacy Med Name: METFORMIN  MG TABLET] 90 tablet 0     Sig: TAKE 2 TABLETS BY MOUTH TWICE A DAY WITH FOOD    Metformin Refill Protocol Failed - 1/2/2019 10:54 AM       Failed - Blood pressure in last 12 months    BP Readings from Last 1 Encounters:   12/11/17 122/80            Failed - LFT or AST or ALT in last 12 months    Albumin   Date Value Ref Range Status   12/11/2017 4.1 3.5 - 5.0 g/dL Final     Bilirubin, Total   Date Value Ref Range Status   12/11/2017 0.6 0.0 - 1.0 mg/dL Final     Alkaline Phosphatase   Date Value Ref Range Status   12/11/2017 67 45 - 120 U/L Final     AST   Date Value Ref Range Status   12/11/2017 14 0 - 40 U/L Final     ALT   Date Value Ref Range Status   12/11/2017 19 0 - 45 U/L Final     Protein, Total   Date Value Ref Range Status   12/11/2017 6.5 6.0 - 8.0 g/dL Final               Failed - GFR or Serum Creatinine in last 6 months    GFR MDRD Non Af Amer   Date Value Ref Range Status   12/11/2017 >60 >60 mL/min/1.73m2 Final     GFR MDRD Af Amer   Date Value Ref Range Status   12/11/2017 >60 >60 mL/min/1.73m2 Final            Failed - Visit with PCP or prescribing provider visit in last 6 months or next 3 months    Last office visit with prescriber/PCP: Visit date not found OR same dept: Visit date not found OR same specialty: 4/25/2017 Shante Cohen MD Last physical: Visit date not found Last MTM visit: Visit date not found         Next appt within 3 mo: Visit date not found  Next physical within 3 mo: Visit date not found  Prescriber OR PCP: Shante Cohen MD  Last diagnosis associated with med order: 1. Type 2 diabetes mellitus without complication, with long-term current use of insulin (H)  - metFORMIN  (GLUCOPHAGE) 500 MG tablet [Pharmacy Med Name: METFORMIN  MG TABLET]; TAKE 2 TABLETS BY MOUTH TWICE A DAY WITH FOOD  Dispense: 90 tablet; Refill: 0     If protocol passes may refill for 12 months if within 3 months of last provider visit (or a total of 15 months).          Failed - A1C in last 6 months    Hemoglobin A1c   Date Value Ref Range Status   12/11/2017 10.1 (H) 3.5 - 6.0 % Final              Failed - Microalbumin in last year     Microalbumin, Random Urine   Date Value Ref Range Status   01/25/2017 0.71 0.00 - 1.99 mg/dL Final

## 2021-06-23 NOTE — TELEPHONE ENCOUNTER
RN cannot approve Refill Request    RN can NOT refill this medication because the patient is overdue for an office visit.. Last office visit: 4/25/2017 Shante Cohen MD Last Physical: 12/11/2017 Last MTM visit: Visit date not found Last visit same specialty: 4/25/2017 Shante Cohen MD.  Next visit within 3 mo: Visit date not found  Next physical within 3 mo: Visit date not found      Marisela Aleman, [facility] Triage/Med Refill 2/9/2019    Requested Prescriptions   Pending Prescriptions Disp Refills     atorvastatin (LIPITOR) 10 MG tablet 90 tablet 3     Sig: Take 1 tablet (10 mg total) by mouth daily.    Statins Refill Protocol (Hmg CoA Reductase Inhibitors) Failed - 2/9/2019 10:37 PM       Failed - PCP or prescribing provider visit in past 12 months     Last office visit with prescriber/PCP: 4/25/2017 Shante Cohen MD OR same dept: Visit date not found OR same specialty: 4/25/2017 Shante Cohen MD  Last physical: 12/11/2017 Last MTM visit: Visit date not found   Next visit within 3 mo: Visit date not found  Next physical within 3 mo: Visit date not found  Prescriber OR PCP: Shante Cohen MD  Last diagnosis associated with med order: 1. Hyperlipidemia  - atorvastatin (LIPITOR) 10 MG tablet; Take 1 tablet (10 mg total) by mouth daily.  Dispense: 90 tablet; Refill: 3    If protocol passes may refill for 12 months if within 3 months of last provider visit (or a total of 15 months).

## 2021-06-24 NOTE — TELEPHONE ENCOUNTER
"Patient Returning Call  Reason for call:  Patient calling back   Information relayed to patient:  Began to relay message, patient cut off writer stating \"there should be notes I received this message last night\" writer finished the notes below and patient again began to say she already got this message and there should have been notes, writer tried to tell patient it was not documented so patient was called again.   Patient has additional questions:  No  If YES, what are your questions/concerns:    Okay to leave a detailed message?: No call back needed  "

## 2021-06-24 NOTE — PROGRESS NOTES
Assessment/Plan:     1. Type 2 diabetes mellitus with diabetic polyneuropathy, with long-term current use of insulin (H)  Poorly controlled with significant hyperglycemia.  Continued compliance with Tarceva and metformin.  Increased receive a to 14 units daily, increasing every 3 days x 2 units up to a total of 20 units.  Update me with blood sugars in 2 weeks.  Referral placed to diabetes education as well.  Discussed importance of compliance with medication and regular routine visits for diabetes under better control.  Reviewed risks of inadequately controlled diabetes and impact on life.  Encouraged continued exercise.  Encouraged her to continue making her own meals.  Refill provided on her test strips.  - Glycosylated Hemoglobin A1c  - insulin degludec (TRESIBA FLEXTOUCH U-100) 100 unit/mL (3 mL) InPn; Inject 14 Units under the skin daily. Increase by 2 units every 3 days as needed until sugars at goal or until 18 units.  Dispense: 15 Syringe; Refill: 4  - Comprehensive Metabolic Panel  - Ambulatory referral to Diabetic Education Program (CDE)  - Microalbumin, Random Urine; Future    2. Visit for screening mammogram  Referral placed for mammogram.  - Mammo Screening Bilateral; Future    3. Screen for colon cancer  Referral placed for colonoscopy.  - Ambulatory referral for Colonoscopy    4.  Hypertension  States she is very anxious this morning, would like to see what her blood pressure is like in the future.  I can have her blood pressure checked when she sees a diabetes educator at her next visit and will consider making further changes from there.      Patient Instructions   -Start checking sugars, aim for twice daily until we get your sugars under better control.  -Mostly before meals.  Once before meal sugars are at goal, then we'll intermittently check after meals.    Tresiba:  Increase to 14 units daily.  Increase by 2 units every 3 days until morning fasting sugar is <130 or until you get a low sugar.   Up to 20 units of Tresiba.    Update me with blood sugars in 2 weeks.    Schedule with diabetes educator.    Schedule mammogram; schedule colonoscopy.           Return in about 3 months (around 5/21/2019) for Diabetes; nurse only BP at your diabetes education visit.      Subjective:      Mishel Garza is a 63 y.o. female presented to clinic today for follow-up of type 2 diabetes.  It has been years since I last saw her in clinic.  She has had this pattern in the past as well.  She has not been checking her blood sugars.  She is been compliant with receiving 10 units daily and metformin 1000 mg twice daily.  She remains on statin and aspirin.  Has just started exercising.  Notes that she had been eating quite a bit of fast food, no starting her meals.  She retired at her last visit, is enjoying and having things at a slower pace.  Denies any chest pain, palpitations, lightheadedness or dizziness, and no edema.  She continues to struggle with some tingling in her toes but it is not painful.    Back pain is doing well overall, therapeutic massage seems to keep in check.  She has not had any recurrence of the edema that she had 1-2 years ago, has not needed furosemide for many months.    Current Outpatient Medications   Medication Sig Dispense Refill     aspirin 325 MG tablet Take 325 mg by mouth daily.       atorvastatin (LIPITOR) 10 MG tablet Take 1 tablet (10 mg total) by mouth daily. 90 tablet 0     blood glucose test (CONTOUR NEXT TEST STRIPS) strips Use to test blood sugars four times daily. Dispense brand per patient's insurance at pharmacy discretion. 100 strip 11     generic lancets Accu-Chek Multiclix Lancets Miscellaneous  Test BG 4x daily 100 each 11     insulin degludec (TRESIBA FLEXTOUCH U-100) 100 unit/mL (3 mL) InPn Inject 14 Units under the skin daily. Increase by 2 units every 3 days as needed until sugars at goal or until 18 units. 15 Syringe 4     metFORMIN (GLUCOPHAGE) 500 MG tablet TAKE 2  "TABLETS BY MOUTH TWICE A DAY WITH FOOD 360 tablet 0     MULTIVIT-MIN/IRON/FOLIC/LUTEIN (CENTRUM SILVER WOMEN ORAL) Take by mouth daily.       multivitamin (MULTIPLE VITAMINS ORAL) Take by mouth. Centrum silver       pen needle, diabetic (BD ULTRA-FINE PRINCESS PEN NEEDLES) 32 gauge x 5/32\" Ndle Use to inject tresiba once daily 100 each 11     No current facility-administered medications for this visit.        Past Medical History, Family History, and Social History reviewed.  Past Medical History:   Diagnosis Date     Factor V Leiden Mutation     Created by Conversion [facility] Annotation: May 19 2008 10:15Ciara Carrillo:  heterozygous;      High cholesterol      Pulmonary hypertension (H)     mild     Tricuspid valve regurgitation, nonrheumatic 3/3/2017    Likely secondary      Type 2 diabetes mellitus (H)     Created by Conversion      No past surgical history on file.  Patient has no known allergies.  Family History   Problem Relation Age of Onset     Factor V Leiden deficiency Mother      Social History     Socioeconomic History     Marital status:      Spouse name: Not on file     Number of children: Not on file     Years of education: Not on file     Highest education level: Not on file   Occupational History     Not on file   Social Needs     Financial resource strain: Not on file     Food insecurity:     Worry: Not on file     Inability: Not on file     Transportation needs:     Medical: Not on file     Non-medical: Not on file   Tobacco Use     Smoking status: Never Smoker     Smokeless tobacco: Never Used   Substance and Sexual Activity     Alcohol use: No     Drug use: No     Sexual activity: Not on file   Lifestyle     Physical activity:     Days per week: Not on file     Minutes per session: Not on file     Stress: Not on file   Relationships     Social connections:     Talks on phone: Not on file     Gets together: Not on file     Attends Roman [employer] service: Not on file     Active member " "of club or organization: Not on file     Attends meetings of clubs or organizations: Not on file     Relationship status: Not on file     Intimate partner violence:     Fear of current or ex partner: Not on file     Emotionally abused: Not on file     Physically abused: Not on file     Forced sexual activity: Not on file   Other Topics Concern     Not on file   Social History Narrative     Not on file         Review of systems is as stated in HPI, and the remainder of the 10 system review is otherwise negative.    Objective:     Vitals:    02/21/19 0740 02/21/19 0744   BP: 152/90 146/88   Pulse: 71    Resp: 20    Temp: 97.8  F (36.6  C)    TempSrc: Oral    SpO2: 97%    Weight: 138 lb (62.6 kg)    Height: 5' 2.5\" (1.588 m)     Body mass index is 24.84 kg/m .    Alert female.  Mucous memories moist.  Heart with regular rate and rhythm.  Lungs clear.  Extremities without edema.  Normal foot exam.      This note has been dictated using voice recognition software. Any grammatical or context distortions are unintentional and inherent to the the software.   "

## 2021-06-24 NOTE — PATIENT INSTRUCTIONS - HE
-Start checking sugars, aim for twice daily until we get your sugars under better control.  -Mostly before meals.  Once before meal sugars are at goal, then we'll intermittently check after meals.    Tresiba:  Increase to 14 units daily.  Increase by 2 units every 3 days until morning fasting sugar is <130 or until you get a low sugar.  Up to 20 units of Tresiba.    Update me with blood sugars in 2 weeks.    Schedule with diabetes educator.    Schedule mammogram; schedule colonoscopy.

## 2021-06-24 NOTE — TELEPHONE ENCOUNTER
Patient notified of results, rx sent to pharmacy and lab only early next week. Patient had to queestions

## 2021-06-24 NOTE — TELEPHONE ENCOUNTER
Critical lab: 1306 today   K 2.6- been taking medication as prescribed, been on potassium for about a week. Dizziness a while ago- but has gotten better, chronic long term issue. Normally happens in the winter. Last episode about 2 weeks ago. Patient indicates no problems with heart rate  Glucose 410- she was not fasting for the appointment. She is a diabetic. No problems with BG tonight.     Paged on call- Dr Miguel Angel Blake @ 8:45 pm- take an extra potassium tonight and PCP will follow up tomorrow.     Called patient and let her know of on call MD Instructions. Patient verbalizes understanding and will take an extra potassium this evening. Denies further questions. Patient has enough medication to take additional one time dose as directed by provider. Follow up with PCP tomorrow.     Yasemin Gifford RN [facility] Triage/Med Refill 3/13/2019 8:50 PM    Reason for Disposition    Lab or radiology calling with CRITICAL test results     Patient is asymptomatic, relayed on call provider instructions.    Protocols used: PCP CALL - NO TRIAGE-A-

## 2021-06-24 NOTE — TELEPHONE ENCOUNTER
----- Message from Shante Cohen MD sent at 3/6/2019  4:45 PM CST -----  Please call pt.  Her potassium level has worsened.  I'd like her to begin potassium chloride 20mg twice daily and have her potassium level rechecked early next week.  If she is feeling weak or any heart palpitations, she should go to the emergency room.

## 2021-06-25 NOTE — PROGRESS NOTES
Progress Notes by Jean Pierre Wilcox DO at 3/3/2017 10:30 AM     Author: Jean Pierre Wilcox DO Service: -- Author Type: Physician    Filed: 3/3/2017 11:23 AM Encounter Date: 3/3/2017 Status: Signed    : Jean Pierre Wilcox DO (Physician)           Click to link to [facility] HEART CARE NOTE    Thank you, Dr. Cohen, for asking the [facility] Heart Care team to see Ms. Mishel Gazra to evaluate atypical chest pain, cuspid regurgitation, mild pulmonary hypertension and lower extremity edema.      Assessment/Recommendations   Assessment:    1. Mild constant chest pain.  Given mild elevation in pulmonary pressures, lower extremity edema I would be concerned for pulmonary embolism as a cause of the symptoms.  She has factor V Leiden disorder which puts her at high risk for pulmonary embolism.   2.  Moderate functional tricuspid regurgitation (VC 6 cm, no hepatic reversal).  This is likely secondary to mild pulmonary hypertension.  I do not believe this is a degenerative disease of the tricuspid valve after reviewing images.    3.  Mild pulmonary hypertension.  Given recent development of chest pain in January associated with lower extremity edema I would be concerned that she had a pulmonary embolism.  If this is negative I would recommend a VQ scan for looking for chronic pulmonary embolism given factor V Leiden.  If that is negative we will consider stress testing given chest pain with DM.  And also consider right heart cath.      Plan:  1. CTA chest PE protocol (today) to assess for acute PE given constant chest pain, newly developed pulmonary hypertension and factor V Leiden disease.    2. Continue lasix for edema.   3.  Further workup will likely be required as stated above.       History of Present Illness    Ms. Mishel Garza is a 61 y.o. female with history of factor V Leiden disorder, diabetes mellitus presents in cardiology clinic in consultation for  lower extremity edema and moderate tricuspid regurgitation noted on echocardiogram.  According to the patient since January 2017 she has noticed mild constant chest discomfort in her lower right chest wall.  Chest pain is not worsened by exertion.  During this time she is also noted significant bilateral lower extremity edema.  Echocardiogram was recently performed which demonstrated moderate tricuspid regurgitation, mild pulmonary hypertension.  Patient denies any recent fevers or chills.  She denies any syncopal episodes.  She does note mild dyspnea with exertion since January 2017.  She has no history of sleep apnea.  She has no history of being on weight loss drugs.  There is no family history of pulmonary hypertension.  Her mother had factor V Leiden disorder.    ECHO (personnaly reviewed):     left ventricular ejection fraction is 64%. No regional wall motion abnormality.    Mild mitral regurgitation    Moderate tricuspid regurgitation (VC:6 cm, no hepatic reversal. Structurally normal valve.  Estimated pulmonary artery pressures are mildly elevated at 48 mmHg.    Trivial pericardial effusion    No previous study for comparison.         Physical Examination Review of Systems   Vitals:    03/03/17 1028   BP: 128/84   Pulse: 88   Resp: 16     Body mass index is 21.1 kg/(m^2).  Wt Readings from Last 3 Encounters:   03/03/17 121 lb (54.9 kg)   02/22/17 129 lb (58.5 kg)   02/20/17 135 lb (61.2 kg)       General Appearance:   no distress, normal body habitus   ENT/Mouth: membranes moist, no oral lesions or bleeding gums.      EYES:  no scleral icterus, normal conjunctivae   Neck: no carotid bruits or thyromegaly   Chest/Lungs:   lungs are clear to auscultation, no rales or wheezing, no sternal scar, equal chest wall expansion    Cardiovascular:   Regular. Normal first and second heart sounds with faint systolic murmur. No rubs, or gallops; the carotid, radial and posterior tibial pulses are intact, Jugular venous  pressure normal at 6 cm, no V wave.  Normal HJR.  Mild pitting edema bilaterally    Abdomen:  no organomegaly, masses, bruits, or tenderness; bowel sounds are present   Extremities: no cyanosis or clubbing   Skin: no xanthelasma, warm.    Neurologic: normal gait, normal  bilateral, no tremors     Psychiatric: alert and oriented x3, calm     General: WNL  Eyes: WNL  Ears/Nose/Throat: WNL  Lungs: WNL  Heart: Chest Pain, Leg Swelling  Stomach: WNL  Bladder: WNL  Muscle/Joints: WNL  Skin: WNL  Nervous System: WNL  Mental Health: WNL     Blood: WNL     Medical History  Surgical History Family History Social History   Past Medical History:   Diagnosis Date   ? Factor V Leiden Mutation     Created by Conversion [facility] Annotation: May 19 2008 10:15Ciara Carrillo:  heterozygous;    ? Tricuspid valve regurgitation, nonrheumatic 3/3/2017    Likely secondary    ? Type 2 diabetes mellitus     Created by Conversion      no prior cardiac surgery.   Family History   Problem Relation Age of Onset   ? Factor V Leiden deficiency Mother     Social History     Social History   ? Marital status:      Spouse name: N/A   ? Number of children: N/A   ? Years of education: N/A     Occupational History   ? Not on file.     Social History Main Topics   ? Smoking status: Never Smoker   ? Smokeless tobacco: Not on file   ? Alcohol use No   ? Drug use: No   ? Sexual activity: Not on file     Other Topics Concern   ? Not on file     Social History Narrative          Medications  Allergies   Current Outpatient Prescriptions   Medication Sig Dispense Refill   ? aspirin 325 MG tablet Take 325 mg by mouth daily.     ? atorvastatin (LIPITOR) 10 MG tablet TAKE ONE TABLET BY MOUTH ONE TIME DAILY 90 tablet 4   ? blood glucose test (CONTOUR NEXT STRIPS) strips Use to test blood sugars four times daily. Dispense brand per patient's insurance at pharmacy discretion. 100 each 11   ? cyclobenzaprine (FLEXERIL) 5 MG tablet Take 1-2  "tablets (5-10 mg total) by mouth bedtime as needed for muscle spasms. 30 tablet 1   ? furosemide (LASIX) 40 MG tablet Take 1 tablet (40 mg total) by mouth daily. 30 tablet 1   ? generic lancets Accu-Chek Multiclix Lancets Miscellaneous  Test BG 4x daily 100 each 11   ? insulin degludec (TRESIBA FLEXTOUCH U-100) 100 unit/mL (3 mL) InPn Inject 10 Units under the skin daily. 9 mL 0   ? metFORMIN (GLUCOPHAGE) 500 MG tablet Take 2 tablets (1,000 mg total) by mouth 2 (two) times a day with meals. 120 tablet 3   ? multivitamin with minerals (THERA-M) 9 mg iron-400 mcg Tab tablet Take 1 tablet by mouth daily.     ? pen needle, diabetic (BD ULTRA-FINE PRINCESS PEN NEEDLES) 32 gauge x 5/32\" Ndle Use to inject tresiba once daily 100 each 11   ? triamcinolone (KENALOG) 0.1 % cream Apply to Rash Twice Daily       No current facility-administered medications for this visit.       No Known Allergies      Lab Results    Chemistry/lipid CBC Cardiac Enzymes/BNP/TSH/INR   Lab Results   Component Value Date    CHOL 165 10/22/2015    HDL 83 10/22/2015    LDLCALC 67 10/22/2015    TRIG 73 10/22/2015    CREATININE 0.73 02/02/2017    BUN 10 02/02/2017    K 3.9 02/02/2017     02/02/2017    CL 93 (L) 02/02/2017    CO2 33 (H) 02/02/2017    Lab Results   Component Value Date    HGB 14.4 09/07/2011    Lab Results   Component Value Date     (H) 01/25/2017    TSH 3.97 01/25/2017                                "

## 2021-06-25 NOTE — TELEPHONE ENCOUNTER
RN cannot approve Refill Request    RN can NOT refill this medication overdue for office visits and/or labs.    Miguel Angel Limon, [facility] Triage/Med Refill 3/16/2019    Requested Prescriptions   Pending Prescriptions Disp Refills     metFORMIN (GLUCOPHAGE) 500 MG tablet [Pharmacy Med Name: METFORMIN  MG TABLET] 120 tablet 0     Sig: TAKE 2 TABS BY MOUTH TWICE DAILY WITH FOOD. **OFFICE VISIT REQUIRED FOR FURTHER REFILLS**    Metformin Refill Protocol Failed - 3/13/2019  5:00 PM       Failed - Microalbumin in last year     Microalbumin, Random Urine   Date Value Ref Range Status   01/25/2017 0.71 0.00 - 1.99 mg/dL Final                 Passed - Blood pressure in last 12 months    BP Readings from Last 1 Encounters:   02/21/19 146/88            Passed - LFT or AST or ALT in last 12 months    Albumin   Date Value Ref Range Status   02/21/2019 4.2 3.5 - 5.0 g/dL Final     Bilirubin, Total   Date Value Ref Range Status   02/21/2019 0.6 0.0 - 1.0 mg/dL Final     Alkaline Phosphatase   Date Value Ref Range Status   02/21/2019 102 45 - 120 U/L Final     AST   Date Value Ref Range Status   02/21/2019 21 0 - 40 U/L Final     ALT   Date Value Ref Range Status   02/21/2019 27 0 - 45 U/L Final     Protein, Total   Date Value Ref Range Status   02/21/2019 6.8 6.0 - 8.0 g/dL Final               Passed - GFR or Serum Creatinine in last 6 months    GFR MDRD Non Af Amer   Date Value Ref Range Status   03/15/2019 >60 >60 mL/min/1.73m2 Final     GFR MDRD Af Amer   Date Value Ref Range Status   03/15/2019 >60 >60 mL/min/1.73m2 Final            Passed - Visit with PCP or prescribing provider visit in last 6 months or next 3 months    Last office visit with prescriber/PCP: 2/21/2019 OR same dept: 2/21/2019 Shante Cohen MD OR same specialty: 2/21/2019 Shante Cohen MD Last physical: Visit date not found Last MTM visit: Visit date not found         Next appt within 3 mo: Visit date not found  Next physical within  3 mo: Visit date not found  Prescriber OR PCP: Shante Cohen MD  Last diagnosis associated with med order: 1. Type 2 diabetes mellitus without complication, with long-term current use of insulin (H)  - metFORMIN (GLUCOPHAGE) 500 MG tablet [Pharmacy Med Name: METFORMIN  MG TABLET]; Take 2 tabs by mouth twice daily with food.  **OFFICE VISIT REQUIRED FOR FURTHER REFILLS**  Dispense: 120 tablet; Refill: 0     If protocol passes may refill for 12 months if within 3 months of last provider visit (or a total of 15 months).          Passed - A1C in last 6 months    Hemoglobin A1c   Date Value Ref Range Status   02/21/2019 13.7 (H) 3.5 - 6.0 % Final

## 2021-06-25 NOTE — TELEPHONE ENCOUNTER
Patient has a lab only appointment tomorrow to recheck her potassium  Please place orders.  Thank you!

## 2021-06-25 NOTE — TELEPHONE ENCOUNTER
Patient was instructed to take an extra potassium tablet last evening.  Please check with her and see how she is feeling today.  Let me know if there are concerns.  Have her take an extra potassium again this evening (Thursday) and return on Friday for a lab recheck of her potassium.  She should work hard to control blood sugar as elevated blood sugar will lower her potassium.  If she feels she develops any symptoms where she is feeling more poorly in any way whatsoever she should seek medical evaluation.

## 2021-06-25 NOTE — TELEPHONE ENCOUNTER
FYI    Reason contacted:  Lab/ symptom  Information relayed:  Spoke with patient who states she is feeling just fine today. She did take an extra potassium yesterday and will do so again this evening. She was scheduled for a lab only appointment tomorrow and will call if she has any concerns.   Additional questions:  No  Further follow-up needed:  No  Okay to leave a detailed message:  No

## 2021-06-25 NOTE — PROGRESS NOTES
Progress Notes by Jean Pierre Wilcox DO at 6/22/2017  1:50 PM     Author: Jean Pierre Wilcox DO Service: -- Author Type: Physician    Filed: 6/22/2017  2:28 PM Encounter Date: 6/22/2017 Status: Signed    : Jean Pierre Wilcox DO (Physician)           Click to link to [facility] HEART CARE NOTE    Thank you, Dr. Cohen, for asking the [facility] Heart Care team to see Ms. Mishel Garza to evaluate atypical chest pain, cuspid regurgitation, mild pulmonary hypertension and lower extremity edema.      Assessment/Recommendations   Assessment:    1. Mild constant chest pain are noncardiac in nature.  Patient does states she has gastroesophageal reflux symptoms.  Her CT did demonstrate some mild thickening her pericardium which is likely nonspecific.  She does not have any clear symptoms of pericarditis at this time.  We did discuss consideration for cardiac MRI if ongoing symptoms to assess pericardium  2.  Mild to Moderate tricuspid regurgitation.  3.  Mild pulmonary hypertension (RVSP:48 mmHg).       Plan:  1.  Patient currently has minimal symptoms at this time.  She is currently on Lasix therapy 40 mg daily with no cardiac symptoms and edema has resolved.  I do not hear any significant murmurs on exam.  We did discuss further treatment options and at this time she prefer to monitor symptoms before proceeding.  2. Continue lasix for edema.   3.  If patient develops worsening chest pain symptoms or dyspnea on exertion would consider cardiac MRI to assess pericardium.       History of Present Illness    Ms. Mishel Garza is a 62 y.o. female with history of factor V Leiden disorder, diabetes mellitus presents in cardiology clinic in [facility] for lower extremity edema.  Since last follow-up the patient has done well with regard to her cardiac symptoms.  She still notes mild episodes of atypical chest pain.  She states these are random times truly occurred at work.   They do not occur during exertion.  They are not worsened immediately after exertion and are not positional.    She did undergo echo stress testing which was reviewed with the patient and by myself.  This demonstrated no sign of ischemia.  She did undergo CTA of her chest to rule out acute pulmonary embolism given ongoing chest pain were initially evaluated her which demonstrate any sign of acute pulmonary embolism.  It did demonstrate an incidental finding of mild increased thickening of her pericardium.  Given this was a non-gated CT this is non-specific.  I did discuss with the patient that MRI would further define this.  At this time she would like to further testing given she has no significant symptoms.    ECHO (personnaly reviewed):     left ventricular ejection fraction is 64%. No regional wall motion abnormality.    Mild mitral regurgitation    Moderate tricuspid regurgitation (VC:6 cm, no hepatic reversal. Structurally normal valve.  Estimated pulmonary artery pressures are mildly elevated at 48 mmHg.    Trivial pericardial effusion    No previous study for comparison.         Physical Examination Review of Systems   Vitals:    06/22/17 1348   BP: 100/62   Pulse: 86   Resp: 18     Body mass index is 19.01 kg/(m^2).  Wt Readings from Last 3 Encounters:   06/22/17 109 lb (49.4 kg)   04/25/17 108 lb (49 kg)   04/06/17 110 lb 6.4 oz (50.1 kg)       General Appearance:   no distress, normal body habitus   ENT/Mouth: membranes moist, no oral lesions or bleeding gums.      EYES:  no scleral icterus, normal conjunctivae   Neck: no carotid bruits or thyromegaly   Chest/Lungs:   lungs are clear to auscultation, no rales or wheezing, no sternal scar, equal chest wall expansion    Cardiovascular:   Regular. Normal first and second heart sounds with no systolic murmur. No rubs, or gallops; the carotid, radial and posterior tibial pulses are intact, Jugular venous pressure normal at 6 cm, no V wave.  Normal HJR.  no  pitting edema bilaterally  (resolved)   Abdomen:  no organomegaly, masses, bruits, or tenderness; bowel sounds are present   Extremities: no cyanosis or clubbing   Skin: no xanthelasma, warm.    Neurologic: normal gait, normal  bilateral, no tremors     Psychiatric: alert and oriented x3, calm     General: WNL  Eyes: WNL  Ears/Nose/Throat: WNL  Lungs: Shortness of Breath  Heart: Chest Pain  Stomach: WNL  Bladder: WNL  Muscle/Joints: WNL  Skin: WNL  Nervous System: Dizziness, Loss of Balance  Mental Health: WNL     Blood: WNL     Medical History  Surgical History Family History Social History   Past Medical History:   Diagnosis Date   ? Factor V Leiden Mutation     Created by Conversion [facility] Annotation: May 19 2008 10:15AM Ciara Alvarado:  heterozygous;    ? High cholesterol    ? Pulmonary hypertension     mild   ? Tricuspid valve regurgitation, nonrheumatic 3/3/2017    Likely secondary    ? Type 2 diabetes mellitus     Created by Conversion      no prior cardiac surgery.   Family History   Problem Relation Age of Onset   ? Factor V Leiden deficiency Mother     Social History     Social History   ? Marital status:      Spouse name: N/A   ? Number of children: N/A   ? Years of education: N/A     Occupational History   ? Not on file.     Social History Main Topics   ? Smoking status: Never Smoker   ? Smokeless tobacco: Not on file   ? Alcohol use No   ? Drug use: No   ? Sexual activity: Not on file     Other Topics Concern   ? Not on file     Social History Narrative          Medications  Allergies   Current Outpatient Prescriptions   Medication Sig Dispense Refill   ? aspirin 325 MG tablet Take 325 mg by mouth daily.     ? atorvastatin (LIPITOR) 10 MG tablet TAKE ONE TABLET BY MOUTH ONE TIME DAILY 90 tablet 4   ? blood glucose test (CONTOUR NEXT STRIPS) strips Use to test blood sugars four times daily. Dispense brand per patient's insurance at pharmacy discretion. 100 each 11   ? furosemide  "(LASIX) 40 MG tablet Take one tablet daily as needed for swelling 30 tablet 3   ? generic lancets Accu-Chek Multiclix Lancets Miscellaneous  Test BG 4x daily 100 each 11   ? insulin degludec (TRESIBA FLEXTOUCH U-100) 100 unit/mL (3 mL) InPn Inject 10 Units under the skin daily. (Patient taking differently: Inject 14 Units under the skin daily. ) 9 mL 0   ? metFORMIN (GLUCOPHAGE) 500 MG tablet TAKE 2 TABLETS (1,000 MG TOTAL) BY MOUTH 2 (TWO) TIMES A DAY WITH MEALS. 120 tablet 3   ? multivitamin with minerals (THERA-M) 9 mg iron-400 mcg Tab tablet Take 1 tablet by mouth daily.     ? pen needle, diabetic (BD ULTRA-FINE PRINCESS PEN NEEDLES) 32 gauge x 5/32\" Ndle Use to inject tresiba once daily 100 each 11   ? triamcinolone (KENALOG) 0.1 % cream as needed.        No current facility-administered medications for this visit.       No Known Allergies      Lab Results    Chemistry/lipid CBC Cardiac Enzymes/BNP/TSH/INR   Lab Results   Component Value Date    CHOL 165 10/22/2015    HDL 83 10/22/2015    LDLCALC 67 10/22/2015    TRIG 73 10/22/2015    CREATININE 0.73 02/02/2017    BUN 10 02/02/2017    K 3.9 02/02/2017     02/02/2017    CL 93 (L) 02/02/2017    CO2 33 (H) 02/02/2017    Lab Results   Component Value Date    WBC 6.9 04/04/2017    WBC 6.9 04/04/2017    HGB 13.6 04/04/2017    HGB 13.6 04/04/2017    HCT 41.2 04/04/2017    HCT 41.2 04/04/2017    MCV 77 (L) 04/04/2017    MCV 77 (L) 04/04/2017     04/04/2017     04/04/2017    Lab Results   Component Value Date     (H) 01/25/2017    TSH 3.59 04/04/2017                                "

## 2021-06-27 ENCOUNTER — HEALTH MAINTENANCE LETTER (OUTPATIENT)
Age: 66
End: 2021-06-27

## 2021-06-29 ENCOUNTER — RECORDS - HEALTHEAST (OUTPATIENT)
Dept: ADMINISTRATIVE | Facility: OTHER | Age: 66
End: 2021-06-29

## 2021-06-29 LAB — RETINOPATHY: NEGATIVE

## 2021-06-30 NOTE — PROGRESS NOTES
Progress Notes by Akiko Schuster, Diabetes Ed at 12/8/2020  1:00 PM     Author: Akiko Schuster, Diabetes Ed Service: -- Author Type: Diabetes Ed    Filed: 12/8/2020  2:35 PM Encounter Date: 12/8/2020 Status: Attested    : Akiko Schuster, Diabetes Ed (Diabetes Ed) Cosigner: Shante Cohen MD at 12/9/2020  7:04 AM    Attestation signed by Shante Cohen MD at 12/9/2020  7:04 AM    I agree with plan of care as outlined.  VJ                 Time spent with the patient: 30 minutes for diabetes education and counseling.   Visit Type:DSMT     Mishel ROOSEVELT Garza is referred by Dr. Shante Cohen for Diabetes Education.     Accompanied by: unaccompanied    Number of days sensor captured:14 days    Average glucose:213  Variability (goal less than or equal to 36%): 37.9%  Time in range  (goal 70%): 32%  Time between 181-250 (goal <25%):41%  Time above 250 (goal <5%): 26%  Time below range: 1%  Cumulative best time of day: 8am-10am with an average of 126  Cumulative highest time of day: 8pm-10pm with average of 298    Discussion  Reviewed with Mishel graph showing daily average over 24 hour period.  Discussed variability and what this means.  Reviewed instances of low blood sugar.  She did not bring her glucose readings or food log in today.  Discussed her blood sugars do come down into a nice range in the morning hours and afternoon is when she is having more trouble.  She does drink a glass of diet orange juice some nights and this is reflective in her overnight highs.  Stated she did not start activity during study, suggested she think about this further as it could help lower her blood sugars now instead of waiting for additional medication.    Current Diabetes medications:  Metformin 1000mg two times a day, Tresiba 18 units daily    Suggested medication changes:  Discussed add on options including GLP-1, SGLT-2 inhibitor and rapid acting insulin.  Reviewed how each medication works, side  effects and pros and cons of each.  Mishel was not open to discussing options further today.    Follow-up:  Suggested she discuss options with her primary, appointment scheduled for January of this.  Suggested she contact provider before appointment through [employer] if questions and concerns she would like to discuss before appointment.                  Monitoring   Meter (per above goals): Assessed and Discussed  Monitoring: Assessed, Discussed and Literature provided  BG goals: Assessed and Discussed    Nutrition Management  Nutrition Management: Assessed and Discussed  Weight: Not addressed  Portions/Balance: Assessed and Discussed  Carb ID/Count: Not addressed  Label Reading: Not addressed  Heart Healthy Fats: Not addressed  Menu Planning: Not addressed  Dining Out: Not addressed  Physical Activity: Assessed and Discussed  Medications: Assessed and Discussed    Diabetes Disease Process: Assessed and Discussed    Acute Complications: Prevent, Detect, Treat:  Hypoglycemia: Assessed and Discussed  Hyperglycemia: Assessed and Discussed  Sick Days: Not addressed  Driving: Not addressed    Chronic Complications  Goal Setting and Problem Solving: Assessed and Discussed  Barriers: Assessed and Discussed  Psychosocial Adjustments: Assessed and Discussed      Akiko Schuster  12/8/2020  2:06 PM

## 2021-07-03 NOTE — ADDENDUM NOTE
Addendum Note by Sergey Moreno MD at 1/25/2017  5:55 PM     Author: Sergey Moreno MD Service: -- Author Type: Physician    Filed: 1/25/2017  5:55 PM Encounter Date: 1/25/2017 Status: Signed    : Sergey Moreno MD (Physician)    Addended by: SERGEY MORENO on: 1/25/2017 05:55 PM        Modules accepted: Orders

## 2021-07-03 NOTE — ADDENDUM NOTE
Addendum Note by Sergey Moreno MD at 3/16/2019 11:43 AM     Author: Sergey Moreno MD Service: -- Author Type: Physician    Filed: 3/16/2019 11:43 AM Encounter Date: 3/13/2019 Status: Signed    : Sergey Moreno MD (Physician)    Addended by: SERGEY MORENO on: 3/16/2019 11:43 AM        Modules accepted: Orders

## 2021-07-03 NOTE — ADDENDUM NOTE
Addendum Note by Aidan Cullen MD at 3/14/2019 12:41 PM     Author: Aidan Cullen MD Service: -- Author Type: Physician    Filed: 3/14/2019 12:41 PM Encounter Date: 3/13/2019 Status: Signed    : Aidan Cullen MD (Physician)    Addended by: AIDAN CULLEN on: 3/14/2019 12:41 PM        Modules accepted: Orders

## 2021-07-03 NOTE — ADDENDUM NOTE
Addendum Note by Sergey Moreno MD at 4/13/2017  4:03 PM     Author: Sergey Moreno MD Service: -- Author Type: Physician    Filed: 4/13/2017  4:03 PM Encounter Date: 4/4/2017 Status: Signed    : Sergey Moreno MD (Physician)    Addended by: SERGEY MORENO on: 4/13/2017 04:03 PM        Modules accepted: Orders

## 2021-07-06 ENCOUNTER — RECORDS - HEALTHEAST (OUTPATIENT)
Dept: HEALTH INFORMATION MANAGEMENT | Facility: CLINIC | Age: 66
End: 2021-07-06

## 2021-07-07 ENCOUNTER — OFFICE VISIT - HEALTHEAST (OUTPATIENT)
Dept: FAMILY MEDICINE | Facility: CLINIC | Age: 66
End: 2021-07-07

## 2021-07-07 DIAGNOSIS — E78.00 PURE HYPERCHOLESTEROLEMIA: ICD-10-CM

## 2021-07-07 DIAGNOSIS — I36.1 TRICUSPID VALVE REGURGITATION, NONRHEUMATIC: ICD-10-CM

## 2021-07-07 DIAGNOSIS — D68.59 PRIMARY HYPERCOAGULABLE STATE (H): ICD-10-CM

## 2021-07-07 DIAGNOSIS — Z00.00 MEDICARE ANNUAL WELLNESS VISIT, SUBSEQUENT: ICD-10-CM

## 2021-07-07 DIAGNOSIS — Z11.59 ENCOUNTER FOR HEPATITIS C SCREENING TEST FOR LOW RISK PATIENT: ICD-10-CM

## 2021-07-07 DIAGNOSIS — M85.80 OSTEOPENIA, UNSPECIFIED LOCATION: ICD-10-CM

## 2021-07-07 DIAGNOSIS — E11.42 TYPE 2 DIABETES MELLITUS WITH DIABETIC POLYNEUROPATHY, WITH LONG-TERM CURRENT USE OF INSULIN (H): ICD-10-CM

## 2021-07-07 DIAGNOSIS — I27.20 PULMONARY HYPERTENSION, MILD (H): ICD-10-CM

## 2021-07-07 DIAGNOSIS — Z79.4 TYPE 2 DIABETES MELLITUS WITH DIABETIC POLYNEUROPATHY, WITH LONG-TERM CURRENT USE OF INSULIN (H): ICD-10-CM

## 2021-07-07 LAB — HBA1C MFR BLD: 11.2 %

## 2021-07-07 RX ORDER — INSULIN DEGLUDEC 100 U/ML
12 INJECTION, SOLUTION SUBCUTANEOUS DAILY
Qty: 15 ML | Refills: 1 | Status: SHIPPED | OUTPATIENT
Start: 2021-07-07 | End: 2022-01-19

## 2021-07-07 ASSESSMENT — MIFFLIN-ST. JEOR: SCORE: 1200.87

## 2021-07-07 NOTE — PROGRESS NOTES
"Progress Notes by Shante Cohen MD at 7/7/2021  7:30 AM     Author: Shante Cohen MD Service: -- Author Type: Physician    Filed: 7/7/2021 11:59 AM Encounter Date: 7/7/2021 Status: Signed    : Shante Cohen MD (Physician)         Assessment and Plan:   1. Medicare annual wellness visit, subsequent  At today's visit, we discussed lifestyle interventions to promote self-management and wellness, including maintenance of a healthy weight, healthy diet, regular physical activity and exercise, and falls prevention.  Information provided regarding advance healthcare directive.  Shingrix vaccine #1 administered today, she is aware of potential that there could be out-of-pocket cost for this.  Up-to-date with routine cancer screening.  We will screen for hepatitis C today per current guidelines.    2. Type 2 diabetes mellitus with diabetic polyneuropathy, with long-term current use of insulin (H)  Inadequate control.  Some dietary indiscretion likely contributing to some extent, though this seems out of proportion.  Increase Tresiba to 12 units, will do so very gradually as her intermittent morning sugars have been in the normal range.  Referral made to diabetes education for consideration of continuous glucose monitor or other blood sugar monitoring alternatives.  Referral placed to endocrinology due to difficulty in managing patient's type 2 diabetes with significant hyperglycemia.  - Glycosylated Hemoglobin A1c  - pen needle, diabetic (BD ULTRA-FINE PRINCESS PEN NEEDLE) 32 gauge x 5/32\" Ndle; USE TO INJECT TRESIBA ONCE DAILY  Dispense: 100 each; Refill: 11  - Ambulatory referral to Endocrinology [facility]  - Ambulatory referral to Diabetes Education Program (CDE)  - insulin degludec (TRESIBA FLEXTOUCH U-100) 100 unit/mL injection pen; Inject 12 Units under the skin daily.  Dispense: 15 mL; Refill: 1    3. Pulmonary hypertension, mild (H)  This is not requiring any active treatments.  No evidence of " decompensation.    4. Tricuspid valve regurgitation, nonrheumatic  This is not clinically active.    5. Factor V Leiden Mutation  He is at increased risk of venous thromboembolism.  No current active symptoms.    6. Hypercholesterolemia  Continue atorvastatin, which she is tolerating well.  Lipids looked good in February.    7. Osteopenia, unspecified location  Risk of fracture is moderate.  Will check vitamin D level today to ensure sufficiency.  Encourage regular weightbearing exercise and measures to reduce risk of falls.    - Vitamin D, Total (25-Hydroxy)    8. Encounter for hepatitis C screening test for low risk patient  We will screen for hepatitis C antibody today.  - Hepatitis C Antibody (Anti-HCV)     The patient's current medical problems were reviewed.    I have had an Advance Directives discussion with the patient.  The following health maintenance schedule was reviewed with the patient and provided in printed form in the after visit summary:   Health Maintenance   Topic Date Due   ? HEPATITIS C SCREENING  Never done   ? ADVANCE CARE PLANNING  02/01/2013   ? ZOSTER VACCINES (2 of 3) 02/21/2014   ? INFLUENZA VACCINE RULE BASED (1) 08/01/2021   ? MICROALBUMIN  11/18/2021   ? A1C  01/07/2022   ? BMP  02/24/2022   ? DIABETIC FOOT EXAM  02/24/2022   ? LIPID  02/24/2022   ? COLORECTAL CANCER SCREENING  06/11/2022   ? DIABETIC EYE EXAM  06/29/2022   ? MEDICARE ANNUAL WELLNESS VISIT  07/07/2022   ? FALL RISK ASSESSMENT  07/07/2022   ? MAMMOGRAM  03/08/2023   ? TD 18+ HE  02/25/2029   ? DEXA SCAN  12/30/2035   ? Pneumococcal Vaccine: Pediatrics (0 to 5 Years) and At-Risk Patients (6 to 64 Years)  Completed   ? Pneumococcal Vaccine: 65+ Years  Completed   ? COVID-19 Vaccine  Completed       Subjective:   Chief Complaint: Mishel Garza is an 66 y.o. female here for an Annual Wellness visit.   HPI: Overall has been doing well without any specific concerns.  New diagnosis of cataracts made by ophthalmologist,  has a follow-up visit later this month with another provider to review.  Admits that she has not been checking her sugars, using glucometer is very much a deterrent for her.  In the last week she has checked her morning sugar twice, it was 120 and 132.  Remains compliant with Invokana, Tresiba 10 units, and Metformin.  Denies any low sugars.  Admits intermittent dietary indiscretion including eating more pizza that is recommended, some portion size challenges, and occasionally will have chips.  Describes enjoying a piece of cake at her daughter's birthday party.  Admits that she has not been exercising regularly.  She otherwise has rather significant carbohydrate awareness and monitors this at her meals without doing specific counting, overall seems to have good insight and understanding of this.  History of pulmonary hypertension, has not had any shortness of breath, fluid retention, or other symptoms.  No lightheadedness or dizziness.  Known tricuspid regurgitation has not been clinically active.  Known history of factor V Leiden, no venous thromboembolism.  Remains on atorvastatin and management of dyslipidemia.    Review of Systems:   Please see above.  The rest of the review of systems are negative for all systems.    Patient Care Team:  Shante Cohen MD as PCP - General  Shante Cohen MD as Assigned PCP  Elizabeth Underwood PharmD as Pharmacist (Pharmacist)     Patient Active Problem List   Diagnosis   ? Hypercholesterolemia   ? Obesity   ? Factor V Leiden Mutation   ? Type 2 diabetes mellitus with diabetic polyneuropathy, with long-term current use of insulin (H)   ? Pulmonary hypertension, mild (H)   ? Tricuspid valve regurgitation, nonrheumatic   ? Osteopenia, unspecified location     Past Medical History:   Diagnosis Date   ? Factor V Leiden Mutation     Created by Systems Integration [facility] Annotation: May 19 2008 10:15Ciara Carrillo:  heterozygous;    ? High cholesterol    ? Pulmonary  hypertension (H)     mild   ? Tricuspid valve regurgitation, nonrheumatic 3/3/2017    Likely secondary    ? Type 2 diabetes mellitus (H)     Created by Conversion       No past surgical history on file.   Family History   Problem Relation Age of Onset   ? Factor V Leiden deficiency Mother       Social History     Socioeconomic History   ? Marital status:      Spouse name: Not on file   ? Number of children: Not on file   ? Years of education: Not on file   ? Highest education level: Not on file   Occupational History   ? Not on file   Social Needs   ? Financial resource strain: Not on file   ? Food insecurity     Worry: Not on file     Inability: Not on file   ? Transportation needs     Medical: Not on file     Non-medical: Not on file   Tobacco Use   ? Smoking status: Never Smoker   ? Smokeless tobacco: Never Used   Substance and Sexual Activity   ? Alcohol use: No   ? Drug use: No   ? Sexual activity: Not on file   Lifestyle   ? Physical activity     Days per week: Not on file     Minutes per session: Not on file   ? Stress: Not on file   Relationships   ? Social connections     Talks on phone: Not on file     Gets together: Not on file     Attends Je[employer]ah's witness service: Not on file     Active member of club or organization: Not on file     Attends meetings of clubs or organizations: Not on file     Relationship status: Not on file   ? Intimate partner violence     Fear of current or ex partner: Not on file     Emotionally abused: Not on file     Physically abused: Not on file     Forced sexual activity: Not on file   Other Topics Concern   ? Not on file   Social History Narrative   ? Not on file      Current Outpatient Medications   Medication Sig Dispense Refill   ? aspirin 325 MG tablet Take 325 mg by mouth daily.     ? atorvastatin (LIPITOR) 10 MG tablet Take 1 tablet by mouth once daily 90 tablet 3   ? blood glucose test (CONTOUR NEXT TEST STRIPS) strips Use to test blood sugars four times daily. Dispense  "brand per patient's insurance at pharmacy discretion. 100 strip 11   ? canagliflozin (INVOKANA) 100 mg Tab Take 1 tablet (100 mg total) by mouth daily before breakfast. Before the 1st meal of the day 90 tablet 1   ? flash glucose scanning reader (FREESTYLE ELLYN 14 DAY READER) Misc Use 1 Units As Directed daily. Use reader to scan glucose  sensor every 8 hours 1 each 0   ? fluconazole (DIFLUCAN) 150 MG tablet Take one tablet at onset of yeast infection symptoms.  May repeat in 3 days if needed. 2 tablet 1   ? generic lancets Accu-Chek Multiclix Lancets Miscellaneous  Test BG 4x daily 100 each 11   ? insulin degludec (TRESIBA FLEXTOUCH U-100) 100 unit/mL injection pen Inject 10 Units under the skin daily. 15 mL 1   ? lancets (ONETOUCH DELICA LANCETS) 33 gauge Misc 1 each by In Vitro route 4 (four) times a day. 100 each 3   ? metFORMIN (GLUCOPHAGE) 500 MG tablet TAKE 2 TABLETS BY MOUTH TWICE DAILY WITH FOOD 360 tablet 3   ? multivitamin (MULTIPLE VITAMINS ORAL) Take by mouth. Centrum silver     ? ONETOUCH VERIO strips USE 1 EACH AS DIRECTED 4 (FOUR) TIMES A DAY. 100 strip 3   ? potassium chloride (K-DUR,KLOR-CON) 20 MEQ tablet Take 1 tablet (20 mEq total) by mouth 2 (two) times a day. 180 tablet 3   ? pen needle, diabetic (BD ULTRA-FINE PRINCESS PEN NEEDLE) 32 gauge x 5/32\" Ndle USE TO INJECT TRESIBA ONCE DAILY 100 each 11     No current facility-administered medications for this visit.       Objective:   Vital Signs:   Visit Vitals  /62   Pulse 77   Temp 97.9  F (36.6  C)   Resp 16   Ht 5' 3\" (1.6 m)   Wt 152 lb 8 oz (69.2 kg)   LMP 12/07/2012   SpO2 96%   BMI 27.01 kg/m           VisionScreening:  No exam data present     PHYSICAL EXAM  Physical Examination: General appearance - alert, well appearing, and in no distress, oriented to person, place, and time and normal appearing weight  Mental status - alert, oriented to person, place, and time, normal mood, behavior, speech, dress, motor activity, and thought " processes  Eyes - pupils equal and reactive, extraocular eye movements intact  Ears - bilateral TM's and external ear canals normal  Nose - normal and patent, no erythema, discharge or polyps  Mouth - mucous membranes moist, pharynx normal without lesions  Neck - supple, no significant adenopathy  Lymphatics - no palpable lymphadenopathy, no hepatosplenomegaly  Chest - clear to auscultation, no wheezes, rales or rhonchi, symmetric air entry  Heart - normal rate, regular rhythm, normal S1, S2, no murmurs, rubs, clicks or gallops  Abdomen - soft, nontender, nondistended, no masses or organomegaly  Breasts - breasts appear normal, no suspicious masses, no skin or nipple changes or axillary nodes  Neurological - alert, oriented, normal speech, no focal findings or movement disorder noted  Musculoskeletal - no joint tenderness, deformity or swelling  Extremities - peripheral pulses normal, no pedal edema, no clubbing or cyanosis  Skin - normal coloration and turgor, no rashes, no suspicious skin lesions noted      Assessment Results 7/7/2021   Activities of Daily Living No help needed   Instrumental Activities of Daily Living No help needed   Mini Cog Total Score 5   Some recent data might be hidden     A Mini-Cog score of 0-2 suggests the possibility of dementia, score of 3-5 suggests no dementia    Identified Health Risks:     She is at risk for lack of exercise and has been provided with information to increase physical activity for the benefit of her well-being.  Information regarding advance directives (living o[place]), including where she can download the appropriate form, was provided to the patient via the AVS.

## 2021-07-07 NOTE — PATIENT INSTRUCTIONS - HE
Patient Instructions by Shante Cohen MD at 7/7/2021  7:30 AM     Author: Shante Cohen MD Service: -- Author Type: Physician    Filed: 7/7/2021  8:13 AM Encounter Date: 7/7/2021 Status: Addendum    : Shante Cohen MD (Physician)    Related Notes: Original Note by Shante Cohen MD (Physician) filed at 7/7/2021  8:10 AM       Increase Tresiba to 12 units at bedtime.  Begin checking your sugars twice daily.  Schedule visit with diabetes education to discuss options for glucose monitoring.  Our endocrinology office will be contacting you to schedule a visit.      Patient Education     Exercise for a Healthier Heart  You may wonder how you can improve the health of your heart. If youre thinking about exercise, youre on the right track. You dont need to become an athlete, but you do need a certain amount of brisk exercise to help strengthen your heart. If you have been diagnosed with a heart condition, your doctor may recommend exercise to help stabilize your condition. To help make exercise a habit, choose safe, fun activities.       Be sure to check with your health care provider before starting an exercise program.    Why exercise?  Exercising regularly offers many healthy rewards. It can help you do all of the following:    Improve your blood cholesterol levels to help prevent further heart trouble    Lower your blood pressure to help prevent a stroke or heart attack    Control diabetes, or reduce your risk of getting this disease    Improve your heart and lung function    Reach and maintain a healthy weight    Make your muscles stronger and more limber so you can stay active    Prevent falls and fractures by slowing the loss of bone mass (osteoporosis)    Manage stress better  Exercise tips  Ease into your routine. Set small goals. Then build on them.  Exercise on most days. Aim for a total of 150 or more minutes of moderate to  vigorous intensity activity each week. Consider 40  minutes, 3 to 4 times a week. For best results, activity should last for 40 minutes on average. It is OK to work up to the 40 minute period over time. Examples of moderate-intensity activity is walking one mile in 15 minutes or 30 to 45 minutes of yard work.  Step up your daily activity level. Along with your exercise program, try being more active throughout the day. Walk instead of drive. Do more household tasks or yard work.  Choose one or more activities you enjoy. Walking is one of the easiest things you can do. You can also try swimming, riding a bike, or taking an exercise class.  Stop exercising and call your doctor if you:    Have chest pain or feel dizzy or lightheaded    Feel burning, tightness, pressure, or heaviness in your chest, neck, shoulders, back, or arms    Have unusual shortness of breath    Have increased joint or muscle pain    Have palpitations or an irregular heartbeat      9720-2884 [employer]. [address] [place] [address]. All rights reserved. This information is not intended as a substitute for professional medical care. Always follow your healthcare professional's instructions.         Patient Education   Understanding Advance Care Planning  Advance care planning is the process of deciding ones own future medical care. It helps ensure that if you cant speak for yourself, your wishes can still be carried out. The plan is a series of legal documents that note a persons wishes. The documents vary by state. Advance care planning may be done when a person has a serious illness that is expected to get worse. It may be done before major surgery. And it can help you and your family be prepared in case of a major illness or injury. Advance care planning helps with making decisions at these times.       A health care proxy is a person who acts as the voice of a patient when the patient cant speak for himself or herself. The name of this role varies by state. It may be  called a Durable Medical Power of  or Durable Power of  for Healthcare. It may be called an agent, surrogate, or advocate. Or it may be called a representative or decision maker. It is an official duty that is identified by a legal document. The document also varies by state.    Why Is Advance Care Planning Important?  If a person communicates their healthcare wishes:    They will be given medical care that matches their values and goals.    Their family members will not be forced to make decisions in a crisis with no guidance.  Creating a Plan  Making an advance care plan is often done in 3 steps:    Thinking about ones wishes. To create an advance care plan, you should think about what kind of medical treatment you would want if you lose the ability to communicate. Are there any situations in which you would refuse or stop treatment? Are there therapies you would want or not want? And whom do you want to make decisions for you? There are many places to learn more about how to plan for your care. Ask your doctor or  for resources.    Picking a health care proxy. This means choosing a trusted person to speak for you only when you cant speak for yourself. When you cannot make medical decisions, your proxy makes sure the instructions in your advance care plan are followed. A proxy does not make decisions based on his or her own opinions. They must put aside those opinions and values if needed, and carry out your wishes.    Filling out the legal documents. There are several kinds of legal documents for advance care planning. Each one tells health care providers your wishes. The documents may vary by state. They must be signed and may need to be witnessed or notarized. You can cancel or change them whenever you wish. Depending on your state, the documents may include a Healthcare Proxy form, Living Will, Durable Medical Power of , Advance Directive, or others.  The Familys Role  The  best help a family can give is to support their loved ones wishes. Open and honest communication is vital. Family should express any concerns they have about the patients choices while the patient can still make decisions.    8622-5364 [employer] [address] [place] [address], [place] [address]. All rights reserved. This information is not intended as a substitute for professional medical care. Always follow your healthcare professional's instructions.         Also, [facility] offers a free, downloadable health care directive that allows you to share your treatment choices and personal preferences if you cannot communicate your wishes. It also allows you to appoint another person (called a health care agent) to make health care decisions if you are unable to do so. You can download an advance directive by going here: [email or web address]     Patient Education   Personalized Prevention Plan  You are due for the preventive services outlined below.  Your care team is available to assist you in scheduling these services.  If you have already completed any of these items, please share that information with your care team to update in your medical record.  Health Maintenance Due   Topic Date Due   ? HEPATITIS C SCREENING  Never done   ? ADVANCE CARE PLANNING  02/01/2013   ? ZOSTER VACCINES (2 of 3) 02/21/2014

## 2021-07-08 ENCOUNTER — COMMUNICATION - HEALTHEAST (OUTPATIENT)
Dept: FAMILY MEDICINE | Facility: CLINIC | Age: 66
End: 2021-07-08

## 2021-07-08 ENCOUNTER — TRANSCRIBE ORDERS (OUTPATIENT)
Dept: FAMILY MEDICINE | Facility: CLINIC | Age: 66
End: 2021-07-08

## 2021-07-08 DIAGNOSIS — E11.9 TYPE 2 DIABETES MELLITUS (H): Primary | ICD-10-CM

## 2021-07-08 LAB
25(OH)D3 SERPL-MCNC: 41.9 NG/ML (ref 30–80)
HCV AB SERPL QL IA: NEGATIVE

## 2021-07-10 VITALS
SYSTOLIC BLOOD PRESSURE: 110 MMHG | DIASTOLIC BLOOD PRESSURE: 62 MMHG | BODY MASS INDEX: 27.02 KG/M2 | HEIGHT: 63 IN | HEART RATE: 77 BPM | WEIGHT: 152.5 LBS | TEMPERATURE: 97.9 F | RESPIRATION RATE: 16 BRPM | OXYGEN SATURATION: 96 %

## 2021-07-11 PROBLEM — M85.80 OSTEOPENIA, UNSPECIFIED LOCATION: Status: ACTIVE | Noted: 2021-07-06

## 2021-07-13 ENCOUNTER — RECORDS - HEALTHEAST (OUTPATIENT)
Dept: ADMINISTRATIVE | Facility: CLINIC | Age: 66
End: 2021-07-13

## 2021-07-21 ENCOUNTER — RECORDS - HEALTHEAST (OUTPATIENT)
Dept: ADMINISTRATIVE | Facility: CLINIC | Age: 66
End: 2021-07-21

## 2021-07-22 NOTE — LETTER
Author: -- Service: -- Author Type: --    Filed:  Encounter Date: 7/8/2021 Status: (Other)       07/08/21  Re: Mishel Garza  Birthday: 1955          Dear Colleague,      Thank you for your referral to Endocrinology. This is a notification to let you know that the patient declined to schedule an appointment at this time.    If you have any questions or concerns, please contact our office at [phone]         Sincerely,    Endocrinology Scheduling

## 2021-07-26 ENCOUNTER — TELEPHONE (OUTPATIENT)
Dept: FAMILY MEDICINE | Facility: CLINIC | Age: 66
End: 2021-07-26

## 2021-07-26 ENCOUNTER — TRANSFERRED RECORDS (OUTPATIENT)
Dept: HEALTH INFORMATION MANAGEMENT | Facility: CLINIC | Age: 66
End: 2021-07-26

## 2021-07-26 LAB — RETINOPATHY: POSITIVE

## 2021-07-26 NOTE — TELEPHONE ENCOUNTER
Reason for Call:  Pre op Appt   nora date : [id number] & [id number]    Detailed comments: pre op    Phone Number Patient can be reached at: Home number on file [phone] (home)    Best Time: anytime    Can we leave a detailed message on this number? YES    Call taken on 7/26/2021 at 4:06 PM by Gayle Lane

## 2021-07-27 DIAGNOSIS — Z79.4 TYPE 2 DIABETES MELLITUS WITH DIABETIC POLYNEUROPATHY, WITH LONG-TERM CURRENT USE OF INSULIN (H): ICD-10-CM

## 2021-07-27 DIAGNOSIS — E11.42 TYPE 2 DIABETES MELLITUS WITH DIABETIC POLYNEUROPATHY, WITH LONG-TERM CURRENT USE OF INSULIN (H): ICD-10-CM

## 2021-07-27 NOTE — TELEPHONE ENCOUNTER
Patient is completely out of medication and hoping this can be refilled today.  Please send to the pharmacy ASAP if appropriate.

## 2021-08-11 ENCOUNTER — TELEPHONE (OUTPATIENT)
Dept: FAMILY MEDICINE | Facility: CLINIC | Age: 66
End: 2021-08-11

## 2021-08-11 DIAGNOSIS — E11.9 TYPE 2 DIABETES MELLITUS WITHOUT COMPLICATION, WITHOUT LONG-TERM CURRENT USE OF INSULIN (H): ICD-10-CM

## 2021-08-11 RX ORDER — BLOOD SUGAR DIAGNOSTIC
STRIP MISCELLANEOUS
Qty: 300 STRIP | Refills: 3 | Status: SHIPPED | OUTPATIENT
Start: 2021-08-11 | End: 2021-08-25

## 2021-08-11 NOTE — TELEPHONE ENCOUNTER
Reason for Call:  Patient is calling for a refill of   ONETOUCH VERIO strips    SEND TO [facility]    Detailed comments: LAST SEEN 07/2021    Phone Number Patient can be reached at: Home number on file [phone] (home)    Best Time: and    Can we leave a detailed message on this number? YES    Call taken on 8/11/2021 at 12:27 PM by Ayanna Benitez

## 2021-08-25 RX ORDER — BLOOD SUGAR DIAGNOSTIC
STRIP MISCELLANEOUS
Qty: 100 STRIP | Refills: 11 | Status: SHIPPED | OUTPATIENT
Start: 2021-08-25 | End: 2021-09-01

## 2021-08-25 NOTE — TELEPHONE ENCOUNTER
Used last strip today- needs this resent in a one month supply with a max testing of once a day without being on insulin per insurance.

## 2021-09-01 ENCOUNTER — TELEPHONE (OUTPATIENT)
Dept: FAMILY MEDICINE | Facility: CLINIC | Age: 66
End: 2021-09-01

## 2021-09-01 ENCOUNTER — OFFICE VISIT (OUTPATIENT)
Dept: FAMILY MEDICINE | Facility: CLINIC | Age: 66
End: 2021-09-01
Payer: MEDICARE

## 2021-09-01 VITALS
DIASTOLIC BLOOD PRESSURE: 62 MMHG | RESPIRATION RATE: 20 BRPM | WEIGHT: 148.3 LBS | TEMPERATURE: 98.1 F | HEART RATE: 74 BPM | OXYGEN SATURATION: 98 % | HEIGHT: 63 IN | SYSTOLIC BLOOD PRESSURE: 104 MMHG | BODY MASS INDEX: 26.28 KG/M2

## 2021-09-01 DIAGNOSIS — Z01.818 PREOP GENERAL PHYSICAL EXAM: Primary | ICD-10-CM

## 2021-09-01 DIAGNOSIS — E78.00 PURE HYPERCHOLESTEROLEMIA: ICD-10-CM

## 2021-09-01 DIAGNOSIS — H25.9 AGE-RELATED CATARACT OF BOTH EYES, UNSPECIFIED AGE-RELATED CATARACT TYPE: ICD-10-CM

## 2021-09-01 DIAGNOSIS — I27.20 PULMONARY HYPERTENSION, MILD (H): ICD-10-CM

## 2021-09-01 DIAGNOSIS — D68.59 PRIMARY HYPERCOAGULABLE STATE (H): ICD-10-CM

## 2021-09-01 DIAGNOSIS — I36.1 TRICUSPID VALVE REGURGITATION, NONRHEUMATIC: ICD-10-CM

## 2021-09-01 DIAGNOSIS — E11.42 TYPE 2 DIABETES MELLITUS WITH DIABETIC POLYNEUROPATHY, WITH LONG-TERM CURRENT USE OF INSULIN (H): ICD-10-CM

## 2021-09-01 DIAGNOSIS — Z79.4 TYPE 2 DIABETES MELLITUS WITH DIABETIC POLYNEUROPATHY, WITH LONG-TERM CURRENT USE OF INSULIN (H): ICD-10-CM

## 2021-09-01 PROCEDURE — 99214 OFFICE O/P EST MOD 30 MIN: CPT | Performed by: FAMILY MEDICINE

## 2021-09-01 RX ORDER — BLOOD SUGAR DIAGNOSTIC
STRIP MISCELLANEOUS
Qty: 400 STRIP | Refills: 11 | Status: SHIPPED | OUTPATIENT
Start: 2021-09-01 | End: 2021-09-14

## 2021-09-01 ASSESSMENT — MIFFLIN-ST. JEOR: SCORE: 1177.84

## 2021-09-01 NOTE — PROGRESS NOTES
[facility]  [address] [facility] [address]  [facility] [address]  Phone: [phone]  Fax: [phone]  Primary Provider: Sergey Moreno  Pre-op Performing Provider: SERGEY MORENO    PREOPERATIVE EVALUATION:  Today's date: 9/1/2021    Mishel Garza is a 66 year old female who presents for a preoperative evaluation.    Surgical Information:  Surgery/Procedure: Cataract   Surgery Location: [facility]  Surgeon: Dr Mcgraw  Surgery Date: rt eye 9-15-21 lt eye 9-29-21  Time of Surgery: To be determined  Where patient plans to recover: At home with family  Fax number for surgical facility: [phone]      Type of Anesthesia Anticipated: Choice    Assessment & Plan     The proposed surgical procedure is considered LOW risk.    Preop general physical exam  Surgical risks include type 2 diabetes with hyperglycemia, more recent control has been excellent overall. I suspect her A1c is falsely elevated based on this information. Morning sugars well controlled, postmeal sugars greatly improved and near goal consistently. History of pulmonary hypertension with recent adequate control. Hypercholesterolemia stable. At this time she is medically stable to proceed with surgery as scheduled. She may proceed with anesthesia and surgery without further clinical clarification or evaluation.    Age-related cataract of both eyes, unspecified age-related cataract type  To proceed with interval surgeries as noted.    Type 2 diabetes mellitus with diabetic polyneuropathy, with long-term current use of insulin (H)  Greatly improved control. Will have her actually reduce her Tresiba from 12 units to 10 units given some borderline hypoglycemia in the mornings. She will continue Metformin and Invokana at current doses. Prior to surgery, will stop Invokana on September 12 and again on September 26 resuming after surgery is complete. She will reduce Tresiba to 60 units the night before  surgery. She will hold Metformin the morning of surgery. The remainder of her medication she can continue before and after surgery.    Pulmonary hypertension, mild (H)  Currently well controlled and asymptomatic.    Tricuspid valve regurgitation, nonrheumatic  Asymptomatic.    Factor V Leiden Mutation  Early ambulation recommended.    Hypercholesterolemia  Continue atorvastatin at current dose.         Risks and Recommendations:  The patient has the following additional risks and recommendations for perioperative complications:      Medication Instructions:  Patient will stop Invokana on September 12 and again on September 26, resuming them once eating after surgery. She will reduce Tresiba to 60 units the night before surgery. She will hold Metformin the morning of surgery. She will continue all other medications as usual before and after procedure.    RECOMMENDATION:  APPROVAL GIVEN to proceed with proposed procedure, without further diagnostic evaluation.            Subjective     HPI related to upcoming procedure: Progressive visual fogginess and reduced clarity related to bilateral cataracts, requiring surgical intervention.    History of type 2 diabetes managed with Invokana, Metformin, and Tresiba, currently taking 12 units of Tresiba. Morning sugars in the 60s to 80s for the most part, occasionally a little higher. Later in the day, sugars mostly at goal, occasionally a little over 200. Most recent A1c July 7 was 11.2, blood sugars were much higher at that time and she has worked quite hard at continued healthy diet and adjustment medication to achieve this. Denies any overt low sugars, none less than 60. She remains on atorvastatin and management of hypercholesterolemia. History of pulmonary hypertension, has not required intervention and this remains asymptomatic. History of factor V Leiden, she has no personal history of venous thromboembolism but does have a maternal family history of venous  thromboembolism. History of chronic anemia for which she takes an iron supplement daily.    Preop Questions 8/29/2021   1. Have you ever had a heart attack or stroke? No   2. Have you ever had surgery on your heart or blood vessels, such as a stent placement, a coronary artery bypass, or surgery on an artery in your head, neck, heart, or legs? No   3. Do you have chest pain with activity? No   4. Do you have a history of  heart failure? No   5. Do you currently have a cold, bronchitis or symptoms of other infection? No   6. Do you have a cough, shortness of breath, or wheezing? No   7. Do you or anyone in your family have previous history of blood clots? YES -mother with DVT, factor V Leiden   8. Do you or does anyone in your family have a serious bleeding problem such as prolonged bleeding following surgeries or cuts? No   9. Have you ever had problems with anemia or been told to take iron pills? YES -chronic anemia, no recent known anemia. Taking daily iron supplement.   10. Have you had any abnormal blood loss such as black, tarry or bloody stools, or abnormal vaginal bleeding? No   11. Have you ever had a blood transfusion? No   12. Are you willing to have a blood transfusion if it is medically needed before, during, or after your surgery? Yes   13. Have you or any of your relatives ever had problems with anesthesia? No   14. Do you have sleep apnea, excessive snoring or daytime drowsiness? No   15. Do you have any artifical heart valves or other implanted medical devices like a pacemaker, defibrillator, or continuous glucose monitor? No   16. Do you have artificial joints? No   17. Are you allergic to latex? No       Health Care Directive:  Patient does not have a Health Care Directive or Living Will: Discussed advance care planning with patient; information given to patient to review.    Preoperative Review of :   reviewed - no record of controlled substances prescribed.    Review of  Systems  CONSTITUTIONAL: NEGATIVE for fever, chills, change in weight  INTEGUMENTARY/SKIN: NEGATIVE for worrisome rashes, moles or lesions  EYES: NEGATIVE for vision changes or irritation  ENT/MOUTH: NEGATIVE for ear, mouth and throat problems  RESP: NEGATIVE for significant cough or SOB  CV: NEGATIVE for chest pain, palpitations or peripheral edema  GI: NEGATIVE for nausea, abdominal pain, heartburn, or change in bowel habits  : NEGATIVE for frequency, dysuria, or hematuria  MUSCULOSKELETAL: NEGATIVE for significant arthralgias or myalgia  NEURO: NEGATIVE for weakness, dizziness or paresthesias  ENDOCRINE: NEGATIVE for temperature intolerance, skin/hair changes  HEME: NEGATIVE for bleeding problems  PSYCHIATRIC: NEGATIVE for changes in mood or affect    Patient Active Problem List    Diagnosis Date Noted     Osteopenia, unspecified location 07/06/2021     Priority: Medium     Type 2 diabetes mellitus with diabetic polyneuropathy, with long-term current use of insulin (H)      Priority: Medium     Created by Conversion         Pulmonary hypertension, mild (H) 03/03/2017     Priority: Medium     Tricuspid valve regurgitation, nonrheumatic 03/03/2017     Priority: Medium     Likely secondary          Hypercholesterolemia      Priority: Medium     Created by Conversion         Obesity      Priority: Medium     Created by Conversion         Factor V Leiden Mutation      Priority: Medium     Created by Conversion  [facility] Annotation: May 19 2008 10:15Ciara Carrillo:   heterozygous;          Past Medical History:   Diagnosis Date     High cholesterol      Primary hypercoagulable state (H)     Created by Conversion [facility] Annotation: May 19 2008 10:Ciara Cristobal:  heterozygous;      Pulmonary hypertension (H)     mild     Tricuspid valve regurgitation, nonrheumatic 3/3/2017    Likely secondary      Type 2 diabetes mellitus (H)     Created by Conversion      No past surgical history on  "file.  Current Outpatient Medications   Medication Sig Dispense Refill     aspirin 325 MG tablet [ASPIRIN 325 MG TABLET] Take 325 mg by mouth daily.       atorvastatin (LIPITOR) 10 MG tablet [ATORVASTATIN (LIPITOR) 10 MG TABLET] Take 1 tablet by mouth once daily 90 tablet 3     blood glucose (ONETOUCH VERIO IQ) test strip Use to test blood sugar once  daily and as needed. 100 strip 11     blood glucose test (CONTOUR NEXT TEST STRIPS) strips [BLOOD GLUCOSE TEST (CONTOUR NEXT TEST STRIPS) STRIPS] Use to test blood sugars four times daily. Dispense brand per patient's insurance at pharmacy discretion. 100 strip 11     canagliflozin (INVOKANA) 100 MG tablet Take 1 tablet (100 mg) by mouth daily before breakfast 90 tablet 3     flash glucose scanning reader (NanotectureSTYLE ELLYN 14 DAY READER) Misc [FLASH GLUCOSE SCANNING READER (FREESTYLE ELLYN 14 DAY READER) MISC] Use 1 Units As Directed daily. Use reader to scan glucose  sensor every 8 hours 1 each 0     fluconazole (DIFLUCAN) 150 MG tablet [FLUCONAZOLE (DIFLUCAN) 150 MG TABLET] Take one tablet at onset of yeast infection symptoms.  May repeat in 3 days if needed. 2 tablet 1     generic lancets [GENERIC LANCETS] Accu-Chek Multiclix Lancets Miscellaneous  Test BG 4x daily 100 each 11     insulin degludec (TRESIBA FLEXTOUCH U-100) 100 unit/mL injection pen [INSULIN DEGLUDEC (TRESIBA FLEXTOUCH U-100) 100 UNIT/ML INJECTION PEN] Inject 12 Units under the skin daily. 15 mL 1     lancets (ONETOUCH DELICA LANCETS) 33 gauge Misc [LANCETS (ONETOUCH DELICA LANCETS) 33 GAUGE MISC] 1 each by In Vitro route 4 (four) times a day. 100 each 3     metFORMIN (GLUCOPHAGE) 500 MG tablet [METFORMIN (GLUCOPHAGE) 500 MG TABLET] TAKE 2 TABLETS BY MOUTH TWICE DAILY WITH FOOD 360 tablet 3     multivitamin (MULTIPLE VITAMINS ORAL) [MULTIVITAMIN (MULTIPLE VITAMINS ORAL)] Take by mouth. Centrum silver       pen needle, diabetic (BD ULTRA-FINE PRINCESS PEN NEEDLE) 32 gauge x 5/32\" Ndle [PEN NEEDLE, DIABETIC " "(BD ULTRA-FINE PRINCESS PEN NEEDLE) 32 GAUGE X 5/32\" NDLE] USE TO INJECT TRESIBA ONCE DAILY 100 each 11     pen needle, diabetic (BD ULTRA-FINE PRINCESS PEN NEEDLE) 32 gauge x 5/32\" Ndle [PEN NEEDLE, DIABETIC (BD ULTRA-FINE PRINCESS PEN NEEDLE) 32 GAUGE X 5/32\" NDLE] USE TO INJECT TRESIBA ONCE DAILY 100 each 11     potassium chloride (K-DUR,KLOR-CON) 20 MEQ tablet [POTASSIUM CHLORIDE (K-DUR,KLOR-CON) 20 MEQ TABLET] Take 1 tablet (20 mEq total) by mouth 2 (two) times a day. 180 tablet 3       No Known Allergies     Social History     Tobacco Use     Smoking status: Never Smoker     Smokeless tobacco: Never Used   Substance Use Topics     Alcohol use: No     Family History   Problem Relation Age of Onset     Factor V Leiden deficiency Mother      History   Drug Use No         Objective     /62   Pulse 74   Temp 98.1  F (36.7  C) (Oral)   Resp 20   Ht 1.594 m (5' 2.75\")   Wt 67.3 kg (148 lb 4.8 oz)   SpO2 98%   BMI 26.48 kg/m      Physical Exam    GENERAL APPEARANCE: healthy, alert and no distress     EYES: EOMI, PERRL     HENT: ear canals and TM's normal and nose and mouth without ulcers or lesions     NECK: no adenopathy, no asymmetry, masses, or scars and thyroid normal to palpation     RESP: lungs clear to auscultation - no rales, rhonchi or wheezes     CV: regular rates and rhythm, normal S1 S2, no S3 or S4 and no murmur, click or rub     ABDOMEN:  soft, nontender, no HSM or masses and bowel sounds normal     MS: extremities normal- no gross deformities noted, no evidence of inflammation in joints, FROM in all extremities.     SKIN: no suspicious lesions or rashes     NEURO: Normal strength and tone, sensory exam grossly normal, mentation intact and speech normal     PSYCH: mentation appears normal. and affect normal/bright     LYMPHATICS: No cervical adenopathy    Recent Labs   Lab Test 07/07/21  0717 02/24/21  0730 02/24/21  0659 02/26/20  0843   NA  --  143  --  143   POTASSIUM  --  4.2  --  4.9   CR  --  0.66 "  --  0.66   A1C 11.2*  --  10.0*  --         Diagnostics:  No labs were ordered during this visit.   No EKG required for low risk surgery (cataract, skin procedure, breast biopsy, etc).    Revised Cardiac Risk Index (RCRI):  The patient has the following serious cardiovascular risks for perioperative complications:   - Diabetes Mellitus (on Insulin) = 1 point     RCRI Interpretation: 1 point: Class II (low risk - 0.9% complication rate)           Signed Electronically by: Shante Cohen MD  Copy of this evaluation report is provided to requesting physician.

## 2021-09-01 NOTE — TELEPHONE ENCOUNTER
[facility] called needed the pre op office note resent as the first one was unsigned.

## 2021-09-01 NOTE — PATIENT INSTRUCTIONS
Stop Invokana on September 12 and September 26, resume Invokana once you begin eating again after surgery.     Before surgery, take only 6 units of Tresiba. If you develop a low sugar prior to surgery (<70), respond by drinking a clear beverage that contain sugar such as juice or Gatorade.    Do not take Metformin the morning of surgery.

## 2021-09-13 DIAGNOSIS — R73.9 HYPERGLYCEMIA: ICD-10-CM

## 2021-09-13 DIAGNOSIS — Z79.4 TYPE 2 DIABETES MELLITUS WITH DIABETIC POLYNEUROPATHY, WITH LONG-TERM CURRENT USE OF INSULIN (H): Primary | ICD-10-CM

## 2021-09-13 DIAGNOSIS — E11.42 TYPE 2 DIABETES MELLITUS WITH DIABETIC POLYNEUROPATHY, WITH LONG-TERM CURRENT USE OF INSULIN (H): Primary | ICD-10-CM

## 2021-09-14 RX ORDER — BLOOD SUGAR DIAGNOSTIC
STRIP MISCELLANEOUS
Qty: 400 STRIP | Refills: 11 | Status: SHIPPED | OUTPATIENT
Start: 2021-09-14 | End: 2021-10-13

## 2021-09-14 NOTE — TELEPHONE ENCOUNTER
"Routing refill request to provider for review/approval because:  Please see pharmacy notes r/t insurance and testing 3 times daily    Last Written Prescription Date:  9/1/20  Last Fill Quantity: 400,  # refills: 11   Last office visit provider:  9/1/21     Requested Prescriptions   Pending Prescriptions Disp Refills     blood glucose (ONETOUCH VERIO IQ) test strip [Pharmacy Med Name: ONETOUCH VERIO DION]  11     Sig: USE TO TEST BLOOD SUGAR 4 TIMES A DAY AS NEEDED       Diabetic Supplies Protocol Passed - 9/13/2021 11:43 AM        Passed - Medication is active on med list        Passed - Patient is 18 years of age or older        Passed - Recent (6 mo) or future (30 days) visit within the authorizing provider's specialty     Patient had office visit in the last 6 months or has a visit in the next 30 days with authorizing provider.  See \"Patient Info\" tab in inbasket, or \"Choose Columns\" in Meds & Orders section of the refill encounter.                 Dennys Red RN 09/14/21 8:15 AM  "

## 2021-10-01 ENCOUNTER — TRANSFERRED RECORDS (OUTPATIENT)
Dept: HEALTH INFORMATION MANAGEMENT | Facility: CLINIC | Age: 66
End: 2021-10-01

## 2021-10-01 LAB — RETINOPATHY: NORMAL

## 2021-10-13 ENCOUNTER — OFFICE VISIT (OUTPATIENT)
Dept: FAMILY MEDICINE | Facility: CLINIC | Age: 66
End: 2021-10-13
Payer: MEDICARE

## 2021-10-13 VITALS
RESPIRATION RATE: 20 BRPM | BODY MASS INDEX: 25.76 KG/M2 | HEIGHT: 63 IN | WEIGHT: 145.4 LBS | TEMPERATURE: 97.7 F | HEART RATE: 82 BPM | OXYGEN SATURATION: 98 % | SYSTOLIC BLOOD PRESSURE: 100 MMHG | DIASTOLIC BLOOD PRESSURE: 62 MMHG

## 2021-10-13 DIAGNOSIS — Z79.4 TYPE 2 DIABETES MELLITUS WITH DIABETIC POLYNEUROPATHY, WITH LONG-TERM CURRENT USE OF INSULIN (H): Primary | ICD-10-CM

## 2021-10-13 DIAGNOSIS — E16.2 HYPOGLYCEMIA: ICD-10-CM

## 2021-10-13 DIAGNOSIS — E11.42 TYPE 2 DIABETES MELLITUS WITH DIABETIC POLYNEUROPATHY, WITH LONG-TERM CURRENT USE OF INSULIN (H): Primary | ICD-10-CM

## 2021-10-13 DIAGNOSIS — R73.9 HYPERGLYCEMIA: ICD-10-CM

## 2021-10-13 LAB — HBA1C MFR BLD: 8 % (ref 0–5.6)

## 2021-10-13 PROCEDURE — 91300 COVID-19,PF,PFIZER (12+ YRS): CPT | Performed by: FAMILY MEDICINE

## 2021-10-13 PROCEDURE — 99214 OFFICE O/P EST MOD 30 MIN: CPT | Mod: 25 | Performed by: FAMILY MEDICINE

## 2021-10-13 PROCEDURE — 90662 IIV NO PRSV INCREASED AG IM: CPT | Performed by: FAMILY MEDICINE

## 2021-10-13 PROCEDURE — 90472 IMMUNIZATION ADMIN EACH ADD: CPT | Performed by: FAMILY MEDICINE

## 2021-10-13 PROCEDURE — 36415 COLL VENOUS BLD VENIPUNCTURE: CPT | Performed by: FAMILY MEDICINE

## 2021-10-13 PROCEDURE — 0004A COVID-19,PF,PFIZER (12+ YRS): CPT | Performed by: FAMILY MEDICINE

## 2021-10-13 PROCEDURE — 83036 HEMOGLOBIN GLYCOSYLATED A1C: CPT | Performed by: FAMILY MEDICINE

## 2021-10-13 PROCEDURE — G0008 ADMIN INFLUENZA VIRUS VAC: HCPCS | Performed by: FAMILY MEDICINE

## 2021-10-13 PROCEDURE — 90750 HZV VACC RECOMBINANT IM: CPT | Performed by: FAMILY MEDICINE

## 2021-10-13 RX ORDER — BLOOD SUGAR DIAGNOSTIC
STRIP MISCELLANEOUS
Qty: 400 STRIP | Refills: 11 | Status: SHIPPED | OUTPATIENT
Start: 2021-10-13 | End: 2022-09-30

## 2021-10-13 ASSESSMENT — MIFFLIN-ST. JEOR: SCORE: 1164.69

## 2021-10-13 NOTE — PROGRESS NOTES
Assessment/Plan:     Type 2 diabetes mellitus with diabetic polyneuropathy, with long-term current use of insulin (H)  Inadequate control yet.  Fasting hypoglycemia.  Reduce Tresiba to 10 units as she has been doing, may need to reduce further continued morning hypoglycemia.  Continue a carbohydrate at bedtime.  I think she would benefit from continuous glucose monitor and your increased frequency of checking sugars such that we could better capture postmeal sugars as I suspect receiving some postmeal hyperglycemia.  For now continue Metformin and Invokana at current doses.  Referral placed to diabetes education to assist with exploring options for continuous glucose monitoring.  Follow-up with me in 3 months.  - Hemoglobin A1c; Future  - Hemoglobin A1c  - [facility] Adult Diabetes Educator Referral; Future  - blood glucose (ONETOUCH VERIO IQ) test strip; USE TO TEST BLOOD SUGAR 4 TIMES A DAY AS NEEDED    Hypoglycemia  Hyperglycemia  She needs more frequent monitoring, once daily is inadequate to accurately capture current state, especially with fluctuation in blood sugars identified.  Send in a new prescription for testing strips for more frequent testing, referral to diabetes education for consideration of continuous glucose monitor.  - blood glucose (ONETOUCH VERIO IQ) test strip; USE TO TEST BLOOD SUGAR 4 TIMES A DAY AS NEEDED    Covid vaccine #3, seasonal influenza vaccine, and Shingrix No. 2 administered today.    There are no Patient Instructions on file for this visit.     Return in about 3 months (around 1/13/2022) for Diabetes.      Subjective:      Mishel Garza is a 66 year old female presented to clinic today for follow-up of type 2 diabetes.  She had to stop her Invokana for about 2 or 3weeks due to cataract surgery, notes her sugars were higher than.  Since then her sugars have come down nicely but her morning sugars at times were in the 50s, so she reduce Tresiba back to 10 units and is added 15 g of  carbohydrate at bedtime.  She is monitoring her carbohydrates closely at meals.  Eating high-protein snacks.  Admits that she is not exercising currently.  Remains compliant with medications.  She is interested in influenza vaccine, Covid #3, and Shingrix No. 2 today.    Current Outpatient Medications   Medication Sig Dispense Refill     aspirin 325 MG tablet [ASPIRIN 325 MG TABLET] Take 325 mg by mouth daily.       atorvastatin (LIPITOR) 10 MG tablet [ATORVASTATIN (LIPITOR) 10 MG TABLET] Take 1 tablet by mouth once daily 90 tablet 3     blood glucose (ONETOUCH VERIO IQ) test strip USE TO TEST BLOOD SUGAR 4 TIMES A DAY AS NEEDED 400 strip 11     blood glucose test (CONTOUR NEXT TEST STRIPS) strips [BLOOD GLUCOSE TEST (CONTOUR NEXT TEST STRIPS) STRIPS] Use to test blood sugars four times daily. Dispense brand per patient's insurance at pharmacy discretion. 100 strip 11     canagliflozin (INVOKANA) 100 MG tablet Take 1 tablet (100 mg) by mouth daily before breakfast 90 tablet 3     flash glucose scanning reader (WirelessGateSTYLE ELLYN 14 DAY READER) Misc [FLASH GLUCOSE SCANNING READER (FREESTYLE ELLYN 14 DAY READER) MISC] Use 1 Units As Directed daily. Use reader to scan glucose  sensor every 8 hours 1 each 0     generic lancets [GENERIC LANCETS] Accu-Chek Multiclix Lancets Miscellaneous  Test BG 4x daily 100 each 11     insulin degludec (TRESIBA FLEXTOUCH U-100) 100 unit/mL injection pen [INSULIN DEGLUDEC (TRESIBA FLEXTOUCH U-100) 100 UNIT/ML INJECTION PEN] Inject 12 Units under the skin daily. 15 mL 1     lancets (ONETOUCH DELICA LANCETS) 33 gauge Misc [LANCETS (ONETOUCH DELICA LANCETS) 33 GAUGE MISC] 1 each by In Vitro route 4 (four) times a day. 100 each 3     metFORMIN (GLUCOPHAGE) 500 MG tablet [METFORMIN (GLUCOPHAGE) 500 MG TABLET] TAKE 2 TABLETS BY MOUTH TWICE DAILY WITH FOOD 360 tablet 3     multivitamin (MULTIPLE VITAMINS ORAL) [MULTIVITAMIN (MULTIPLE VITAMINS ORAL)] Take by mouth. Centrum silver       pen needle,  "diabetic (BD ULTRA-FINE PRINCESS PEN NEEDLE) 32 gauge x 5/32\" Ndle [PEN NEEDLE, DIABETIC (BD ULTRA-FINE PRINCESS PEN NEEDLE) 32 GAUGE X 5/32\" NDLE] USE TO INJECT TRESIBA ONCE DAILY 100 each 11     potassium chloride (K-DUR,KLOR-CON) 20 MEQ tablet [POTASSIUM CHLORIDE (K-DUR,KLOR-CON) 20 MEQ TABLET] Take 1 tablet (20 mEq total) by mouth 2 (two) times a day. 180 tablet 3       Past Medical History, Family History, and Social History reviewed.  Past Medical History:   Diagnosis Date     High cholesterol      Primary hypercoagulable state (H)     Created by Conversion [facility] Annotation: May 19 2008 10:15AM Ciara Alvarado:  heterozygous;      Pulmonary hypertension (H)     mild     Tricuspid valve regurgitation, nonrheumatic 3/3/2017    Likely secondary      Type 2 diabetes mellitus (H)     Created by Conversion      No past surgical history on file.  Patient has no known allergies.  Family History   Problem Relation Age of Onset     Factor V Leiden deficiency Mother      Social History     Socioeconomic History     Marital status:      Spouse name: Not on file     Number of children: Not on file     Years of education: Not on file     Highest education level: Not on file   Occupational History     Not on file   Tobacco Use     Smoking status: Never Smoker     Smokeless tobacco: Never Used   Substance and Sexual Activity     Alcohol use: No     Drug use: No     Sexual activity: Not on file   Other Topics Concern     Not on file   Social History Narrative     Not on file     Social Determinants of Health     Financial Resource Strain:      Difficulty of Paying Living Expenses:    Food Insecurity:      Worried About Running Out of Food in the Last Year:      Ran Out of Food in the Last Year:    Transportation Needs:      Lack of Transportation (Medical):      Lack of Transportation (Non-Medical):    Physical Activity:      Days of Exercise per Week:      Minutes of Exercise per Session:    Stress:      Feeling of " "Stress :    Social Connections:      Frequency of Communication with Friends and Family:      Frequency of Social Gatherings with Friends and Family:      Attends Hindu Services:      Active Member of Clubs or Organizations:      Attends Club or Organization Meetings:      Marital Status:    Intimate Partner Violence:      Fear of Current or Ex-Partner:      Emotionally Abused:      Physically Abused:      Sexually Abused:          Review of systems is as stated in HPI, and the remainder of the 10 system review is otherwise negative.    Objective:     Vitals:    10/13/21 0705   BP: 100/62   Pulse: 82   Resp: 20   Temp: 97.7  F (36.5  C)   TempSrc: Oral   SpO2: 98%   Weight: 66 kg (145 lb 6.4 oz)   Height: 1.594 m (5' 2.75\")    Body mass index is 25.96 kg/m .    Alert female.  Very pleasant.  Mucous membranes moist.  Heart with regular rate and rhythm.  Lungs clear.  Feet are normal in appearance.      This note has been dictated using voice recognition software. Any grammatical or context distortions are unintentional and inherent to the the software.     "

## 2021-11-30 ENCOUNTER — ALLIED HEALTH/NURSE VISIT (OUTPATIENT)
Dept: EDUCATION SERVICES | Facility: CLINIC | Age: 66
End: 2021-11-30
Attending: FAMILY MEDICINE
Payer: MEDICARE

## 2021-11-30 ENCOUNTER — MEDICAL CORRESPONDENCE (OUTPATIENT)
Dept: HEALTH INFORMATION MANAGEMENT | Facility: CLINIC | Age: 66
End: 2021-11-30

## 2021-11-30 VITALS — WEIGHT: 150.5 LBS | BODY MASS INDEX: 26.87 KG/M2

## 2021-11-30 DIAGNOSIS — E11.42 TYPE 2 DIABETES MELLITUS WITH DIABETIC POLYNEUROPATHY, WITH LONG-TERM CURRENT USE OF INSULIN (H): ICD-10-CM

## 2021-11-30 DIAGNOSIS — Z79.4 TYPE 2 DIABETES MELLITUS WITH DIABETIC POLYNEUROPATHY, WITH LONG-TERM CURRENT USE OF INSULIN (H): ICD-10-CM

## 2021-11-30 PROCEDURE — G0108 DIAB MANAGE TRN  PER INDIV: HCPCS

## 2021-11-30 RX ORDER — FLASH GLUCOSE SENSOR
KIT MISCELLANEOUS
Qty: 2 EACH | Refills: 11 | Status: SHIPPED | OUTPATIENT
Start: 2021-11-30 | End: 2022-05-02

## 2021-11-30 NOTE — LETTER
11/30/2021         RE: Mishel Garza  [address]  [facility] [address]        Dear Colleague,    Thank you for referring your patient, Mishel Garza, to the [facility]. Please see a copy of my visit note below.    No notes on file

## 2021-11-30 NOTE — CONFIDENTIAL NOTE
"Diabetes Self-Management Education & Support    Presents for:      SUBJECTIVE/OBJECTIVE:  Diabetes education in the past 24mo: (P) No  Diabetes type: (P) Type 2  Disease course: (P) Improving  Diabetes management related comments/concerns: (P) blood sugar testing  Cultural Influences/Ethnic Background:  Not  or       Diabetes Symptoms & Complications:  Fatigue: (P) No  Neuropathy: (P) Yes  Polydipsia: (P) No  Polyphagia: (P) No  Polyuria: (P) No  Visual change: (P) No  Slow healing wounds: (P) No  Autonomic neuropathy: (P) No  CVA: (P) No  Heart disease: (P) No  Nephropathy: (P) No  Peripheral neuropathy: (P) No  Peripheral Vascular Disease: (P) No  Retinopathy: (P) No  Sexual dysfunction: (P) No    Patient Problem List and Family Medical History reviewed for relevant medical history, current medical status, and diabetes risk factors.    Vitals:  Wt 68.3 kg (150 lb 8 oz)   BMI 26.87 kg/m    Estimated body mass index is 26.87 kg/m  as calculated from the following:    Height as of 10/13/21: 1.594 m (5' 2.75\").    Weight as of this encounter: 68.3 kg (150 lb 8 oz).   Last 3 BP:   BP Readings from Last 3 Encounters:   10/13/21 100/62   09/01/21 104/62   07/07/21 110/62       History   Smoking Status     Never Smoker   Smokeless Tobacco     Never Used       Labs:  Lab Results   Component Value Date    A1C 8.0 10/13/2021     Lab Results   Component Value Date    GLC 75 02/24/2021     Lab Results   Component Value Date    LDL 61 02/24/2021     Direct Measure HDL   Date Value Ref Range Status   02/24/2021 65 >=50 mg/dL Final   ]  GFR Estimate   Date Value Ref Range Status   02/24/2021 >60 >60 mL/min/1.73m2 Final     GFR Estimate If Black   Date Value Ref Range Status   02/24/2021 >60 >60 mL/min/1.73m2 Final     Lab Results   Component Value Date    CR 0.66 02/24/2021     No results found for: MICROALBUMIN    Healthy Eating:  Cultural/Pentecostalism diet restrictions?: (P) No  Meal planning/habits: (P) Avoiding " sweets,Carb counting,Smaller portions  How many times a week on average do you eat food made away from home (restaurant/take-out)?: (P) 1  Meals include: (P) Breakfast,Lunch,Dinner,Morning Snack,Evening Snack  Beverages: (P) Water,Diet soda   B- 1/2 banana, yogurt   Lunch and dinner vary, tries to keep 40-45 grams of carb.  Feels good about carb counting skills.  S-dry roasted peanuts, fruit  Feels like she needs to eat a bedtime snack to keep blood sugars from running low overnight.    Being Active:  Barrier to exercise: (P) None    Monitoring:  Blood Glucose Meter: (P) One Touch  Times checking blood sugar at home (number): (P) 1  Times checking blood sugar at home (per): (P) Day  Blood glucose trend: (P) Decreasing  Stated she is frustrated as her insurance does not consider Tresiba on insulin so they will not cover more than 1 test strip daily.  She has only been checking her fasting blood sugar daily at this point.  Her fasting blood sugars range from  with 2 readings in the morning of November of 66.  Stated feels sweaty and blurred vision when low.    Taking Medications:  Diabetes Medication(s)     Biguanides       metFORMIN (GLUCOPHAGE) 500 MG tablet    [METFORMIN (GLUCOPHAGE) 500 MG TABLET] TAKE 2 TABLETS BY MOUTH TWICE DAILY WITH FOOD    Insulin       insulin degludec (TRESIBA FLEXTOUCH U-100) 100 unit/mL injection pen    [INSULIN DEGLUDEC (TRESIBA FLEXTOUCH U-100) 100 UNIT/ML INJECTION PEN] Inject 12 Units under the skin daily.     Patient taking differently: Inject 10 Units Subcutaneous daily     Sodium-Glucose Co-Transporter 2 (SGLT2) Inhibitors       canagliflozin (INVOKANA) 100 MG tablet    Take 1 tablet (100 mg) by mouth daily before breakfast          Current Treatments: (P) Diet,Non-insulin Injectables,Oral Medication (taken by mouth)    Problem Solving:    Reducing Risks:  CAD Risks: (P) Diabetes Mellitus,Post-menopausal  Has dilated eye exam at least once a year?: (P) Yes  Sees dentist  every 6 months?: (P) Yes  Feet checked by healthcare provider in the last year?: (P) Yes    Healthy Coping:  Informal Support system:: (P) Family,Friends,Spouse  Patient Activation Measure Survey Score:  No flowsheet data found.    Diabetes knowledge and skills assessment:   Patient is knowledgeable in diabetes management concepts related to: Healthy Eating, Being Active, Monitoring and Taking Medication    Patient needs further education on the following diabetes management concepts: Monitoring and Healthy Coping    Based on learning assessment above, most appropriate setting for further diabetes education would be: Individual setting.      INTERVENTIONS:    Education provided today on:  AADE Self-Care Behaviors:  Monitoring: log and interpret results, individual blood glucose targets and frequency of monitoring  Healthy Coping: recognize feelings about diagnosis and methods for coping with stress    Opportunities for ongoing education and support in diabetes-self management were discussed.    Pt verbalized understanding of concepts discussed and recommendations provided today.       Education Materials Provided:  No new materials provided today      ASSESSMENT:  Mishel is here alone today to discuss getting a continuous glucose monitoring system.    Mishel is currently taking Tresiba 10 units nightly, Invokana 100mg in the morning and Metformin 1000mg twice daily with meals.  Stated she does not miss her medications, she is good about remembering them when she goes out as well.    Currently she is only able to test her blood sugar one time daily as her insurance will not cover any additional test strips.  She is taking Tresiba, which is an insulin, per Mishel's report her insurance does not see it this way.  She checks her fasting sugar daily with a range of .  Since decreasing her insulin to 10 units she has had two readings of sixty-six.    We did review Mishel's current meal plan, which is noted below under  healthy eating section.    Mishel understands the benefit of activity for her blood sugar as well as other medical conditions and overall health.  Stated she will continue working on this.    Plan:  Believe Mishel's insurance will not cover a CGM due to the fact that she is not on mealtime insulin.  Will send prescription to mail order pharmacy and see if they can get it covered under her part B insurance.  If not consider freestyle vadim pro study after the first of the year.        Patient's most recent   Lab Results   Component Value Date    A1C 8.0 10/13/2021    is not meeting goal of <8.0    PLAN  See Patient Instructions for co-developed, patient-stated behavior change goals.  AVS printed and provided to patient today. See Follow-Up section for recommended follow-up.     The service provided today was under the supervising provider, Yessica Orlando, who was available if needed.     Time Spent: 30 minutes  Encounter Type: Individual    Any diabetes medication dose changes were made via the CDE Protocol and Collaborative Practice Agreement with the patient's primary care provider. A copy of this encounter was shared with the provider.

## 2021-12-02 ENCOUNTER — TELEPHONE (OUTPATIENT)
Dept: FAMILY MEDICINE | Facility: CLINIC | Age: 66
End: 2021-12-02
Payer: COMMERCIAL

## 2021-12-02 DIAGNOSIS — E11.42 TYPE 2 DIABETES MELLITUS WITH DIABETIC POLYNEUROPATHY, WITH LONG-TERM CURRENT USE OF INSULIN (H): Primary | ICD-10-CM

## 2021-12-02 DIAGNOSIS — Z79.4 TYPE 2 DIABETES MELLITUS WITH DIABETIC POLYNEUROPATHY, WITH LONG-TERM CURRENT USE OF INSULIN (H): Primary | ICD-10-CM

## 2021-12-02 NOTE — TELEPHONE ENCOUNTER
"Please send prescription for Freestyle Abran 2 Mobile  Qty 1, Refills 0  Directions \"use to read bloods sugars per  instructions\"    Please call and speak to one of our Durable Medical Equipment Team members if you have any questions- [phone]    "

## 2021-12-08 DIAGNOSIS — E11.9 TYPE 2 DIABETES MELLITUS WITHOUT COMPLICATION, WITHOUT LONG-TERM CURRENT USE OF INSULIN (H): ICD-10-CM

## 2021-12-08 RX ORDER — FLASH GLUCOSE SENSOR
1 KIT MISCELLANEOUS DAILY
Qty: 1 EACH | Refills: 0 | Status: CANCELLED | OUTPATIENT
Start: 2021-12-08

## 2021-12-08 RX ORDER — FLASH GLUCOSE SCANNING READER
EACH MISCELLANEOUS
Qty: 1 EACH | Refills: 0 | Status: SHIPPED | OUTPATIENT
Start: 2021-12-08 | End: 2024-07-30 | Stop reason: ALTCHOICE

## 2021-12-08 NOTE — TELEPHONE ENCOUNTER
"Requesting Freestyle Abran 14 day reader device rx from Sh[facility] Cohen.  Must be the Abran 14 day for Medicare compliance.      Freestyle Abran 14 Day [place]ons  Qty 1 Refills 0  Sig \"use to read blood sugars per  instructions\"    Please call and speak to one of our Durable Medical Equipment Team members if you have any questions- [phone]    "

## 2021-12-14 ENCOUNTER — TELEPHONE (OUTPATIENT)
Dept: EDUCATION SERVICES | Facility: CLINIC | Age: 66
End: 2021-12-14
Payer: COMMERCIAL

## 2021-12-14 NOTE — TELEPHONE ENCOUNTER
"Spoke w/ pt. She got a call from  pharmacy and they are delivering the vadim on Thursday. Pt states Sara told her to give a call when she gets the sensor and she could \"squeeze\" in her quick to show her how to use it.     Informed pt will have Sara call her tomorrow to get her in, not sure where Sara wants to squeeze in.     Pt verbalized understanding.   "

## 2021-12-20 DIAGNOSIS — Z79.4 TYPE 2 DIABETES MELLITUS WITHOUT COMPLICATION, WITH LONG-TERM CURRENT USE OF INSULIN (H): ICD-10-CM

## 2021-12-20 DIAGNOSIS — E11.9 TYPE 2 DIABETES MELLITUS WITHOUT COMPLICATION, WITH LONG-TERM CURRENT USE OF INSULIN (H): ICD-10-CM

## 2021-12-21 ENCOUNTER — ALLIED HEALTH/NURSE VISIT (OUTPATIENT)
Dept: EDUCATION SERVICES | Facility: CLINIC | Age: 66
End: 2021-12-21
Payer: MEDICARE

## 2021-12-21 DIAGNOSIS — Z79.4 TYPE 2 DIABETES MELLITUS WITH DIABETIC POLYNEUROPATHY, WITH LONG-TERM CURRENT USE OF INSULIN (H): Primary | ICD-10-CM

## 2021-12-21 DIAGNOSIS — E11.42 TYPE 2 DIABETES MELLITUS WITH DIABETIC POLYNEUROPATHY, WITH LONG-TERM CURRENT USE OF INSULIN (H): Primary | ICD-10-CM

## 2021-12-21 PROCEDURE — G0108 DIAB MANAGE TRN  PER INDIV: HCPCS

## 2021-12-21 NOTE — PROGRESS NOTES
Mishel is here alone today for her diabetes education.  She recently received her Freestyle vadim 14-day sensors and reader in the mail.  She called last week stating she would like some assistance with placing the sensor for the first time.    Reviewed reader including 60-minute delay after starting a new sensor, how to check glucose with reader and charging.  Also reviewed long history, daily graphs, average glucose and time in range.    Instructed Mishel on attaching the sensor.  She was able to place the sensor on her upper left arm without assistance.  Discussed changing placement of next sensor, when to reorder and to call if she has any questions or concerns.    All additional questions and concerns addressed today.     The service provided today was under the supervising provider, Rob Amezcua, who was available if needed.   DSMT 30 minutes individual  Sara Schuster RN, [facility]  [phone]  In Clinic Tuesday, Wednesday, Thursday

## 2021-12-21 NOTE — LETTER
12/21/2021         RE: Mishel Garza  [address] [facility] [facility] [address]        Dear Colleague,    Thank you for referring your patient, Mishel Garza, to the [facility]. Please see a copy of my visit note below.    Mishel is here alone today for her diabetes education.  She recently received her Freestyle vadim 14-day sensors and reader in the mail.  She called last week stating she would like some assistance with placing the sensor for the first time.    Reviewed reader including 60-minute delay after starting a new sensor, how to check glucose with reader and charging.  Also reviewed long history, daily graphs, average glucose and time in range.    Instructed Mishel on attaching the sensor.  She was able to place the sensor on her upper left arm without assistance.  Discussed changing placement of next sensor, when to reorder and to call if she has any questions or concerns.    All additional questions and concerns addressed today.     The service provided today was under the supervising provider, Rob Amezcua, who was available if needed.   DSMT 30 minutes individual  Sara Schuster RN, [facility]  [phone]  In Clinic Tuesday, Wednesday, Thursday

## 2021-12-22 NOTE — TELEPHONE ENCOUNTER
"Last Written Prescription Date:  12/21/2020  Last Fill Quantity: 360,  # refills: 3   Last office visit provider:  10/13/2021 with Dr Cohen.     Requested Prescriptions   Pending Prescriptions Disp Refills     metFORMIN (GLUCOPHAGE) 500 MG tablet [Pharmacy Med Name: metFORMIN HCl 500 MG Oral Tablet] 360 tablet 0     Sig: TAKE 2 TABLETS BY MOUTH TWICE DAILY WITH FOOD       Biguanide Agents Passed - 12/20/2021  6:55 AM        Passed - Patient is age 10 or older        Passed - Patient has documented A1c within the specified period of time.     If HgbA1C is 8 or greater, it needs to be on file within the past 3 months.  If less than 8, must be on file within the past 6 months.     Recent Labs   Lab Test 10/13/21  0705   A1C 8.0*             Passed - Patient's CR is NOT>1.4 OR Patient's EGFR is NOT<45 within past 12 mos.     Recent Labs   Lab Test 02/24/21  0730   GFRESTIMATED >60   GFRESTBLACK >60       Recent Labs   Lab Test 02/24/21  0730   CR 0.66             Passed - Patient does NOT have a diagnosis of CHF.        Passed - Medication is active on med list        Passed - Patient is not pregnant        Passed - Patient has not had a positive pregnancy test within the past 12 mos.         Passed - Recent (6 mo) or future (30 days) visit within the authorizing provider's specialty     Patient had office visit in the last 6 months or has a visit in the next 30 days with authorizing provider or within the authorizing provider's specialty.  See \"Patient Info\" tab in inbasket, or \"Choose Columns\" in Meds & Orders section of the refill encounter.                 Luciana Morelos 12/21/21 11:46 PM  "

## 2021-12-27 DIAGNOSIS — E87.6 HYPOKALEMIA: ICD-10-CM

## 2021-12-29 RX ORDER — POTASSIUM CHLORIDE 1500 MG/1
TABLET, EXTENDED RELEASE ORAL
Qty: 180 TABLET | Refills: 0 | Status: SHIPPED | OUTPATIENT
Start: 2021-12-29 | End: 2022-03-31

## 2021-12-29 NOTE — TELEPHONE ENCOUNTER
"Last Written Prescription Date:  12/20/20  Last Fill Quantity: 180,  # refills: 3   Last office visit provider:  10/13/21     Requested Prescriptions   Pending Prescriptions Disp Refills     potassium chloride ER (KLOR-CON M) 20 MEQ CR tablet [Pharmacy Med Name: Potassium Chloride Arlette ER 20 MEQ Oral Tablet Extended Release] 180 tablet 0     Sig: Take 1 tablet by mouth twice daily       Potassium Supplements Protocol Passed - 12/27/2021  7:49 AM        Passed - Recent (12 mo) or future (30 days) visit within the authorizing provider's department     Patient has had an office visit with the authorizing provider or a provider within the authorizing providers department within the previous 12 mos or has a future within next 30 days. See \"Patient Info\" tab in inbasket, or \"Choose Columns\" in Meds & Orders section of the refill encounter.              Passed - Medication is active on med list        Passed - Patient is age 18 or older        Passed - Normal serum potassium in past 12 months     Recent Labs   Lab Test 02/24/21  0730   POTASSIUM 4.2                         Dennys Red RN 12/29/21 7:58 AM  "

## 2022-01-19 ENCOUNTER — OFFICE VISIT (OUTPATIENT)
Dept: FAMILY MEDICINE | Facility: CLINIC | Age: 67
End: 2022-01-19
Payer: MEDICARE

## 2022-01-19 VITALS
RESPIRATION RATE: 16 BRPM | DIASTOLIC BLOOD PRESSURE: 64 MMHG | SYSTOLIC BLOOD PRESSURE: 100 MMHG | WEIGHT: 147 LBS | HEIGHT: 63 IN | HEART RATE: 74 BPM | BODY MASS INDEX: 26.05 KG/M2 | OXYGEN SATURATION: 99 % | TEMPERATURE: 97.8 F

## 2022-01-19 DIAGNOSIS — D68.59 PRIMARY HYPERCOAGULABLE STATE (H): ICD-10-CM

## 2022-01-19 DIAGNOSIS — I36.1 TRICUSPID VALVE REGURGITATION, NONRHEUMATIC: ICD-10-CM

## 2022-01-19 DIAGNOSIS — E11.42 TYPE 2 DIABETES MELLITUS WITH DIABETIC POLYNEUROPATHY, WITH LONG-TERM CURRENT USE OF INSULIN (H): Primary | ICD-10-CM

## 2022-01-19 DIAGNOSIS — Z79.4 TYPE 2 DIABETES MELLITUS WITH DIABETIC POLYNEUROPATHY, WITH LONG-TERM CURRENT USE OF INSULIN (H): Primary | ICD-10-CM

## 2022-01-19 DIAGNOSIS — E78.00 PURE HYPERCHOLESTEROLEMIA: ICD-10-CM

## 2022-01-19 DIAGNOSIS — I27.20 PULMONARY HYPERTENSION, MILD (H): ICD-10-CM

## 2022-01-19 LAB
CREAT UR-MCNC: 108 MG/DL
HBA1C MFR BLD: 7.7 % (ref 0–5.6)
HOLD SPECIMEN: NORMAL
HOLD SPECIMEN: NORMAL
MICROALBUMIN UR-MCNC: 1.45 MG/DL (ref 0–1.99)
MICROALBUMIN/CREAT UR: 13.4 MG/G CR

## 2022-01-19 PROCEDURE — 83036 HEMOGLOBIN GLYCOSYLATED A1C: CPT | Performed by: FAMILY MEDICINE

## 2022-01-19 PROCEDURE — 82043 UR ALBUMIN QUANTITATIVE: CPT | Performed by: FAMILY MEDICINE

## 2022-01-19 PROCEDURE — 99214 OFFICE O/P EST MOD 30 MIN: CPT | Performed by: FAMILY MEDICINE

## 2022-01-19 PROCEDURE — 36415 COLL VENOUS BLD VENIPUNCTURE: CPT | Performed by: FAMILY MEDICINE

## 2022-01-19 RX ORDER — INSULIN DEGLUDEC 100 U/ML
8 INJECTION, SOLUTION SUBCUTANEOUS DAILY
Qty: 15 ML | Refills: 1
Start: 2022-01-19 | End: 2022-01-19

## 2022-01-19 RX ORDER — INSULIN DEGLUDEC 100 U/ML
6 INJECTION, SOLUTION SUBCUTANEOUS DAILY
Qty: 15 ML | Refills: 1 | Status: SHIPPED | OUTPATIENT
Start: 2022-01-19 | End: 2022-06-24

## 2022-01-19 ASSESSMENT — MIFFLIN-ST. JEOR: SCORE: 1171.95

## 2022-01-19 NOTE — PROGRESS NOTES
Assessment/Plan:     Type 2 diabetes mellitus with diabetic polyneuropathy, with long-term current use of insulin (H)  Morning hypoglycemia with overall inadequate control as A1c remains greater than 7.  Going to have him reduce Tresiba to 60 units.  Ultimately I think we may be able to transition from Tresiba to something like glipizide daytime which should provide better mealtime coverage with lower risk for overnight hypoglycemia.  We discussed options and we elect to do so gradually by reducing Tresiba first.  Continue metformin and Invokana at current doses.  We will check urine microalbumin today.  She will update me via [facility] in 3 to 4 weeks, follow-up with me in 3 months.  - Hemoglobin A1c; Future  - Albumin Random Urine Quantitative with Creat Ratio; Future  - Hemoglobin A1c  - Albumin Random Urine Quantitative with Creat Ratio  - insulin degludec (TRESIBA FLEXTOUCH) 100 UNIT/ML pen; Inject 6 Units Subcutaneous daily    Hypercholesterolemia  She continues on a atorvastatin.    Pulmonary hypertension, mild (H)  This remains asymptomatic and well controlled.  No evidence of fluid overload today.    Tricuspid valve regurgitation, nonrheumatic  This remains asymptomatic and does not require further monitoring at this time.    Factor V Leiden Mutation  No evidence of venous thromboembolism, no additional measures needed.      There are no Patient Instructions on file for this visit.     Return in about 3 months (around 4/19/2022) for Follow up diabetes.      Subjective:      Mishel Garza is a 66 year old female presented to clinic today for follow-up of type 2 diabetes.  She is been feeling well.  She is now using the vadim sensor and is doing well with it.  Morning sugars have tended to run low in the 70s to 100s, tend to be a bit higher later in the day in the 150s to 200s.  Isolated blood sugar of 219 after grocery shopping, then the next morning it was only 64.  Taking Tresiba 10 units in addition  to metformin and Invokana.  Denies chest pain, palpitations, lightheadedness, shortness of breath, or fluid retention.  No orthopnea.  No swelling in her legs.    Current Outpatient Medications   Medication Sig Dispense Refill     aspirin 325 MG tablet [ASPIRIN 325 MG TABLET] Take 325 mg by mouth daily.       atorvastatin (LIPITOR) 10 MG tablet [ATORVASTATIN (LIPITOR) 10 MG TABLET] Take 1 tablet by mouth once daily 90 tablet 3     blood glucose (ONETOUCH VERIO IQ) test strip USE TO TEST BLOOD SUGAR 4 TIMES A DAY AS NEEDED 400 strip 11     blood glucose test (CONTOUR NEXT TEST STRIPS) strips [BLOOD GLUCOSE TEST (CONTOUR NEXT TEST STRIPS) STRIPS] Use to test blood sugars four times daily. Dispense brand per patient's insurance at pharmacy discretion. 100 strip 11     canagliflozin (INVOKANA) 100 MG tablet Take 1 tablet (100 mg) by mouth daily before breakfast 90 tablet 3     Continuous Blood Gluc  (FREESTYLE ELLYN 14 DAY READER) LAZARO Use to read blood sugars as per 's instructions. 1 each 0     Continuous Blood Gluc Sensor (FREESTYLE ELLYN 14 DAY SENSOR) MISC Change every 14 days. Use per manufacture's instructions to check glucose daily 2 each 11     flash glucose scanning reader (FREESTYLE ELLYN 14 DAY READER) Misc [FLASH GLUCOSE SCANNING READER (FREESTYLE ELLYN 14 DAY READER) MISC] Use 1 Units As Directed daily. Use reader to scan glucose  sensor every 8 hours 1 each 0     generic lancets [GENERIC LANCETS] Accu-Chek Multiclix Lancets Miscellaneous  Test BG 4x daily 100 each 11     insulin degludec (TRESIBA FLEXTOUCH) 100 UNIT/ML pen Inject 8 Units Subcutaneous daily 15 mL 1     lancets (ONETOUCH DELICA LANCETS) 33 gauge Misc [LANCETS (ONETOUCH DELICA LANCETS) 33 GAUGE MISC] 1 each by In Vitro route 4 (four) times a day. 100 each 3     metFORMIN (GLUCOPHAGE) 500 MG tablet TAKE 2 TABLETS BY MOUTH TWICE DAILY WITH FOOD 360 tablet 1     multivitamin (MULTIPLE VITAMINS ORAL) [MULTIVITAMIN (MULTIPLE  "VITAMINS ORAL)] Take by mouth. Centrum silver       pen needle, diabetic (BD ULTRA-FINE PRINCESS PEN NEEDLE) 32 gauge x 5/32\" Ndle [PEN NEEDLE, DIABETIC (BD ULTRA-FINE PRINCESS PEN NEEDLE) 32 GAUGE X 5/32\" NDLE] USE TO INJECT TRESIBA ONCE DAILY 100 each 11     potassium chloride ER (KLOR-CON M) 20 MEQ CR tablet Take 1 tablet by mouth twice daily 180 tablet 0       Past Medical History, Family History, and Social History reviewed.  Past Medical History:   Diagnosis Date     High cholesterol      Primary hypercoagulable state (H)     Created by Conversion [facility] Annotation: May 19 2008 10:15AM Ciara Alvarado:  heterozygous;      Pulmonary hypertension (H)     mild     Tricuspid valve regurgitation, nonrheumatic 3/3/2017    Likely secondary      Type 2 diabetes mellitus (H)     Created by Conversion      No past surgical history on file.  Patient has no known allergies.  Family History   Problem Relation Age of Onset     Factor V Leiden deficiency Mother      Social History     Socioeconomic History     Marital status:      Spouse name: Not on file     Number of children: Not on file     Years of education: Not on file     Highest education level: Not on file   Occupational History     Not on file   Tobacco Use     Smoking status: Never Smoker     Smokeless tobacco: Never Used   Substance and Sexual Activity     Alcohol use: No     Drug use: No     Sexual activity: Not on file   Other Topics Concern     Not on file   Social History Narrative     Not on file     Social Determinants of Health     Financial Resource Strain: Not on file   Food Insecurity: Not on file   Transportation Needs: Not on file   Physical Activity: Not on file   Stress: Not on file   Social Connections: Not on file   Intimate Partner Violence: Not on file   Housing Stability: Not on file         Review of systems is as stated in HPI, and the remainder of the 10 system review is otherwise negative.    Objective:     Vitals:    01/19/22 " "0703   BP: 100/64   Pulse: 74   Resp: 16   Temp: 97.8  F (36.6  C)   TempSrc: Oral   SpO2: 99%   Weight: 66.7 kg (147 lb)   Height: 1.594 m (5' 2.75\")    Body mass index is 26.25 kg/m .    Alert female.  Mucous membranes moist.  Heart with regular rate and rhythm.  Lungs clear and well aerated.  Extremities without edema.    Office Visit on 01/19/2022   Component Date Value Ref Range Status     Hemoglobin A1C 01/19/2022 7.7* 0.0 - 5.6 % Final    Normal <5.7%   Prediabetes 5.7-6.4%    Diabetes 6.5% or higher     Note: Adopted from ADA consensus guidelines.            This note has been dictated using voice recognition software. Any grammatical or context distortions are unintentional and inherent to the the software.     "

## 2022-04-19 NOTE — ADDENDUM NOTE
Addendum Note by Omid Samano CMA at 1/25/2017  6:11 PM     Author: Omid Samano CMA Service: -- Author Type: Certified Medical Assistant    Filed: 1/25/2017  6:11 PM Encounter Date: 1/25/2017 Status: Signed    : Omid Samano CMA (Certified Medical Assistant)    Addended by: OMID SAMANO on: 1/25/2017 06:11 PM        Modules accepted: Orders         I would complete a 6 week course and then try stopping to see if sx resolve. If sx return take otc pepcid 20mg nightly thereafter. If still sx, pt should let me know

## 2022-04-29 DIAGNOSIS — E11.42 TYPE 2 DIABETES MELLITUS WITH DIABETIC POLYNEUROPATHY, WITH LONG-TERM CURRENT USE OF INSULIN (H): ICD-10-CM

## 2022-04-29 DIAGNOSIS — Z79.4 TYPE 2 DIABETES MELLITUS WITH DIABETIC POLYNEUROPATHY, WITH LONG-TERM CURRENT USE OF INSULIN (H): ICD-10-CM

## 2022-05-02 RX ORDER — FLASH GLUCOSE SENSOR
KIT MISCELLANEOUS
Qty: 2 EACH | Refills: 11 | Status: SHIPPED | OUTPATIENT
Start: 2022-05-02 | End: 2022-05-08

## 2022-05-04 DIAGNOSIS — Z79.4 TYPE 2 DIABETES MELLITUS WITH DIABETIC POLYNEUROPATHY, WITH LONG-TERM CURRENT USE OF INSULIN (H): ICD-10-CM

## 2022-05-04 DIAGNOSIS — E11.42 TYPE 2 DIABETES MELLITUS WITH DIABETIC POLYNEUROPATHY, WITH LONG-TERM CURRENT USE OF INSULIN (H): ICD-10-CM

## 2022-05-04 NOTE — TELEPHONE ENCOUNTER
Chief Complaint   Patient presents with     Refill Request     Free style sensors      Pricilla Jim RN on 5/4/2022 at 10:46 AM

## 2022-05-05 NOTE — TELEPHONE ENCOUNTER
Patient spoke with pharmacy and they are requesting notes from last visit in order to fill Free Style Sensors.

## 2022-05-08 RX ORDER — FLASH GLUCOSE SENSOR
KIT MISCELLANEOUS
Qty: 2 EACH | Refills: 11 | Status: SHIPPED | OUTPATIENT
Start: 2022-05-08 | End: 2022-09-15

## 2022-05-08 NOTE — TELEPHONE ENCOUNTER
Routing refill request to provider for review/approval because:  Please see notes from patient related to visit notes?    Last Written Prescription Date:  5/2/22  Last Fill Quantity: 2,  # refills: 11   Last office visit provider:  1/19/22     Requested Prescriptions   Pending Prescriptions Disp Refills     Continuous Blood Gluc Sensor (FREESTYLE ELLYN 14 DAY SENSOR) MISC 2 each 11     Sig: Change every 14 days. Use per manufacture's instructions to check glucose daily       There is no refill protocol information for this order          Dennys Red RN 05/08/22 8:12 AM

## 2022-05-11 DIAGNOSIS — E11.42 TYPE 2 DIABETES MELLITUS WITH DIABETIC POLYNEUROPATHY, WITH LONG-TERM CURRENT USE OF INSULIN (H): ICD-10-CM

## 2022-05-11 DIAGNOSIS — Z79.4 TYPE 2 DIABETES MELLITUS WITH DIABETIC POLYNEUROPATHY, WITH LONG-TERM CURRENT USE OF INSULIN (H): ICD-10-CM

## 2022-05-27 ENCOUNTER — ANCILLARY PROCEDURE (OUTPATIENT)
Dept: MAMMOGRAPHY | Facility: CLINIC | Age: 67
End: 2022-05-27
Attending: FAMILY MEDICINE
Payer: MEDICARE

## 2022-05-27 DIAGNOSIS — Z12.31 VISIT FOR SCREENING MAMMOGRAM: ICD-10-CM

## 2022-05-27 PROCEDURE — 77067 SCR MAMMO BI INCL CAD: CPT

## 2022-06-22 DIAGNOSIS — E11.42 TYPE 2 DIABETES MELLITUS WITH DIABETIC POLYNEUROPATHY, WITH LONG-TERM CURRENT USE OF INSULIN (H): ICD-10-CM

## 2022-06-22 DIAGNOSIS — E11.9 TYPE 2 DIABETES MELLITUS WITHOUT COMPLICATION, WITH LONG-TERM CURRENT USE OF INSULIN (H): ICD-10-CM

## 2022-06-22 DIAGNOSIS — Z79.4 TYPE 2 DIABETES MELLITUS WITH DIABETIC POLYNEUROPATHY, WITH LONG-TERM CURRENT USE OF INSULIN (H): ICD-10-CM

## 2022-06-22 DIAGNOSIS — Z79.4 TYPE 2 DIABETES MELLITUS WITHOUT COMPLICATION, WITH LONG-TERM CURRENT USE OF INSULIN (H): ICD-10-CM

## 2022-06-22 NOTE — TELEPHONE ENCOUNTER
"Routing refill request to provider for review/approval because:  Labs not current:      Last Written Prescription Date:  12/21/21  Last Fill Quantity: 360,  # refills: 1   Last office visit provider:  1/19/22     Requested Prescriptions   Pending Prescriptions Disp Refills     metFORMIN (GLUCOPHAGE) 500 MG tablet [Pharmacy Med Name: metFORMIN HCl 500 MG Oral Tablet] 360 tablet 0     Sig: TAKE 2 TABLETS BY MOUTH TWICE DAILY WITH FOOD       Biguanide Agents Failed - 6/22/2022  8:27 AM        Failed - Patient's CR is NOT>1.4 OR Patient's EGFR is NOT<45 within past 12 mos.     Recent Labs   Lab Test 02/24/21  0730   GFRESTIMATED >60   GFRESTBLACK >60       Recent Labs   Lab Test 02/24/21  0730   CR 0.66             Passed - Patient is age 10 or older        Passed - Patient has documented A1c within the specified period of time.     If HgbA1C is 8 or greater, it needs to be on file within the past 3 months.  If less than 8, must be on file within the past 6 months.     Recent Labs   Lab Test 01/19/22  0653   A1C 7.7*             Passed - Patient does NOT have a diagnosis of CHF.        Passed - Medication is active on med list        Passed - Patient is not pregnant        Passed - Patient has not had a positive pregnancy test within the past 12 mos.         Passed - Recent (6 mo) or future (30 days) visit within the authorizing provider's specialty     Patient had office visit in the last 6 months or has a visit in the next 30 days with authorizing provider or within the authorizing provider's specialty.  See \"Patient Info\" tab in inbasket, or \"Choose Columns\" in Meds & Orders section of the refill encounter.               TRESIBA FLEXTOUCH 100 UNIT/ML pen [Pharmacy Med Name: Tresiba FlexTouch 100 UNIT/ML Subcutaneous Solution Pen-injector] 15 mL 0     Sig: INJECT 12 UNITS SUBCUTANEOUSLY ONCE DAILY       Long Acting Insulin Protocol Failed - 6/22/2022  8:27 AM        Failed - Serum creatinine on file in past 12 months " "    Recent Labs   Lab Test 02/24/21  0730   CR 0.66       Ok to refill medication if creatinine is low          Passed - HgbA1C in past 3 or 6 months     If HgbA1C is 8 or greater, it needs to be on file within the past 3 months.  If less than 8, must be on file within the past 6 months.     Recent Labs   Lab Test 01/19/22  0653   A1C 7.7*             Passed - Medication is active on med list        Passed - Patient is age 18 or older        Passed - Recent (6 mo) or future (30 days) visit within the authorizing provider's specialty     Patient had office visit in the last 6 months or has a visit in the next 30 days with authorizing provider or within the authorizing provider's specialty.  See \"Patient Info\" tab in inbasket, or \"Choose Columns\" in Meds & Orders section of the refill encounter.                 Robyn Esqueda RN 06/22/22 6:54 PM  "

## 2022-06-24 RX ORDER — INSULIN DEGLUDEC 100 U/ML
INJECTION, SOLUTION SUBCUTANEOUS
Qty: 15 ML | Refills: 4 | Status: SHIPPED | OUTPATIENT
Start: 2022-06-24 | End: 2022-09-15

## 2022-07-24 ENCOUNTER — HEALTH MAINTENANCE LETTER (OUTPATIENT)
Age: 67
End: 2022-07-24

## 2022-09-15 ENCOUNTER — OFFICE VISIT (OUTPATIENT)
Dept: FAMILY MEDICINE | Facility: CLINIC | Age: 67
End: 2022-09-15
Payer: MEDICARE

## 2022-09-15 VITALS
BODY MASS INDEX: 26.07 KG/M2 | OXYGEN SATURATION: 98 % | SYSTOLIC BLOOD PRESSURE: 104 MMHG | DIASTOLIC BLOOD PRESSURE: 70 MMHG | WEIGHT: 146 LBS | HEART RATE: 81 BPM

## 2022-09-15 DIAGNOSIS — Z79.4 TYPE 2 DIABETES MELLITUS WITH DIABETIC POLYNEUROPATHY, WITH LONG-TERM CURRENT USE OF INSULIN (H): Primary | ICD-10-CM

## 2022-09-15 DIAGNOSIS — E11.42 TYPE 2 DIABETES MELLITUS WITH DIABETIC POLYNEUROPATHY, WITH LONG-TERM CURRENT USE OF INSULIN (H): Primary | ICD-10-CM

## 2022-09-15 DIAGNOSIS — M85.80 OSTEOPENIA, UNSPECIFIED LOCATION: ICD-10-CM

## 2022-09-15 DIAGNOSIS — Z13.220 SCREENING FOR HYPERLIPIDEMIA: ICD-10-CM

## 2022-09-15 DIAGNOSIS — E78.00 PURE HYPERCHOLESTEROLEMIA: ICD-10-CM

## 2022-09-15 DIAGNOSIS — Z12.11 SCREEN FOR COLON CANCER: ICD-10-CM

## 2022-09-15 LAB
HBA1C MFR BLD: 7.9 % (ref 0–5.6)
HOLD SPECIMEN: NORMAL

## 2022-09-15 PROCEDURE — 90662 IIV NO PRSV INCREASED AG IM: CPT | Performed by: FAMILY MEDICINE

## 2022-09-15 PROCEDURE — G0008 ADMIN INFLUENZA VIRUS VAC: HCPCS | Performed by: FAMILY MEDICINE

## 2022-09-15 PROCEDURE — 99214 OFFICE O/P EST MOD 30 MIN: CPT | Mod: 25 | Performed by: FAMILY MEDICINE

## 2022-09-15 PROCEDURE — 83036 HEMOGLOBIN GLYCOSYLATED A1C: CPT | Performed by: FAMILY MEDICINE

## 2022-09-15 PROCEDURE — 36415 COLL VENOUS BLD VENIPUNCTURE: CPT | Performed by: FAMILY MEDICINE

## 2022-09-15 RX ORDER — GLIPIZIDE 5 MG/1
5 TABLET, FILM COATED, EXTENDED RELEASE ORAL DAILY
Qty: 90 TABLET | Refills: 1 | Status: SHIPPED | OUTPATIENT
Start: 2022-09-15 | End: 2022-09-29 | Stop reason: SINTOL

## 2022-09-15 RX ORDER — GLIPIZIDE 5 MG/1
5 TABLET, FILM COATED, EXTENDED RELEASE ORAL DAILY
Qty: 90 TABLET | Refills: 1 | Status: SHIPPED | OUTPATIENT
Start: 2022-09-15 | End: 2022-09-15

## 2022-09-15 RX ORDER — FLASH GLUCOSE SENSOR
KIT MISCELLANEOUS
Qty: 2 EACH | Refills: 11 | Status: SHIPPED | OUTPATIENT
Start: 2022-09-15 | End: 2022-09-30

## 2022-09-15 NOTE — PROGRESS NOTES
Assessment & Plan     Type 2 diabetes mellitus with diabetic polyneuropathy, with long-term current use of insulin (H)  Inadequate control with goal A1c less than 7.  She is having significant meal related hyperglycemia, is taking only a tiny amount of Tresiba at 6 units.  We will stop Tresiba and instead begin glipizide 5 mg daily to see if this will give her a little better mealtime coverage.  If blood sugars are not controlled after a few days, then will increase to 10 mg.  She may only require a small amount.  She is at significant risk of persisting hyperglycemia or new hypoglycemia with this change.  In order to fully assess her diabetes, it is important that she be able to check her blood sugars routinely, at least 3 times daily but ideally 4 times daily.  Continue healthy diet.  Encouraged introduction of regular exercise.  We will check comprehensive metabolic panel and lipids today.  She has her eye exam scheduled for next month, believes her last eye exam was September or October of 2022.  - Hemoglobin A1c; Standing  - Hemoglobin A1c  - Continuous Blood Gluc Sensor (Minoryx TherapeuticsSTYLE ELLYN 14 DAY SENSOR) MISC; Change every 14 days. Use per manufacture's instructions to check glucose daily  - Comprehensive metabolic panel; Future  - glipiZIDE (GLUCOTROL XL) 5 MG 24 hr tablet; Take 1 tablet (5 mg) by mouth daily    ADDENDUM 9/30/22: Following clinic visit, patient notify us of significant side effects from glipizide, therefore we will be discontinuing glipizide instead resuming Tresiba 6 units daily.  Updated prescription sent to pharmacy.       Hypercholesterolemia  Continue atorvastatin.  We will check fasting lipids today.    Osteopenia, unspecified location  Will check vitamin D level today.  Advised regular physical activity.  - Vitamin D Deficiency; Future    Screening for hyperlipidemia  - Lipid panel reflex to direct LDL Non-fasting; Future    Screen for colon cancer  She is due for follow-up colonoscopy,  "orders placed.  - Colonoscopy Screening  Referral; Future             BMI:   Estimated body mass index is 26.07 kg/m  as calculated from the following:    Height as of 1/19/22: 1.594 m (5' 2.75\").    Weight as of this encounter: 66.2 kg (146 lb).   Weight management plan: Discussed healthy diet and exercise guidelines  Blood sugar testing frequency justification:  Adjustment of medication(s)      Return in about 3 months (around 12/15/2022) for Follow up diabetes and annual wellness visit.   Follow-up Visit   Expected date: As directed      Follow Up Appointment Details:     Follow-up with whom?: Me    Follow-Up for what?: Medicare Wellness    Welcome or Annual?: Annual Wellness    How?: In Person                    Shante Cohen MD  [facility] is a 67 year old, presenting for the following health issues:  No chief complaint on file.      Doing well.  Frustrated as she often is having highs after meals despite efforts to minimize and control her carbohydrates.  She has a clear understanding of carbohydrate controlled.  Admits she is not doing any regular exercise.  Using freestyle vadim meter which has been very helpful for her.  Remains on atorvastatin management of high cholesterol, due for follow-up of this.  She is due for vitamin D level as well due to history of osteopenia.  She is due for her follow-up colonoscopy.  She is agreeable to flu vaccine, we also discussed PCV 20 and COVID booster.    History of Present Illness       Diabetes:   She presents for follow up of diabetes.  She is checking home blood glucose four or more times daily. She checks blood glucose before meals and at bedtime.  Blood glucose is sometimes over 200 and sometimes under 70. She is aware of hypoglycemia symptoms including blurred vision. She has no concerns regarding her diabetes at this time.  She is not experiencing numbness or burning in feet, excessive thirst, blurry " vision, weight changes or redness, sores or blisters on feet. The patient has had a diabetic eye exam in the last 12 months. Eye exam performed on October 2021. Location of last eye exam [facility].        She eats 4 or more servings of fruits and vegetables daily.She consumes 0 sweetened beverage(s) daily.She exercises with enough effort to increase her heart rate 9 or less minutes per day.  She exercises with enough effort to increase her heart rate 3 or less days per week.   She is taking medications regularly.             Review of Systems         Objective    /70   Pulse 81   Wt 66.2 kg (146 lb)   SpO2 98%   BMI 26.07 kg/m    Body mass index is 26.07 kg/m .  Physical Exam   Alert and pleasant female.  Heart with regular rate and rhythm.  Lungs clear.  Extremities without edema.  Diabetic foot exam: normal DP and PT pulses, no trophic changes or ulcerative lesions and normal sensory exam     Office Visit on 09/15/2022   Component Date Value Ref Range Status     Hemoglobin A1C 09/15/2022 7.9 (A) 0.0 - 5.6 % Final    Normal <5.7%   Prediabetes 5.7-6.4%    Diabetes 6.5% or higher     Note: Adopted from ADA consensus guidelines.     Hold Specimen 09/15/2022 [facility]   Final

## 2022-09-15 NOTE — PATIENT INSTRUCTIONS
Stop Tresiba.  Begin glipizide 5 mg extended release in the morning.  If your sugars jump up with 5 mg, we can increase to 10 mg.  Let me know if you are getting any low sugars or if your sugars are not in reasonable range with this adjustment.  We may need to do a combination of Tresiba and glipizide for short time if we are seeing significant high sugars.  Update me with your sugars in a month.    Be sure to keep your eye exam as scheduled later this year.    I placed an order for your colonoscopy, be sure to get this scheduled for later this year.

## 2022-09-29 ENCOUNTER — MYC MEDICAL ADVICE (OUTPATIENT)
Dept: FAMILY MEDICINE | Facility: CLINIC | Age: 67
End: 2022-09-29

## 2022-09-29 DIAGNOSIS — Z79.4 TYPE 2 DIABETES MELLITUS WITH DIABETIC POLYNEUROPATHY, WITH LONG-TERM CURRENT USE OF INSULIN (H): ICD-10-CM

## 2022-09-29 DIAGNOSIS — E11.42 TYPE 2 DIABETES MELLITUS WITH DIABETIC POLYNEUROPATHY, WITH LONG-TERM CURRENT USE OF INSULIN (H): ICD-10-CM

## 2022-09-29 DIAGNOSIS — R73.9 HYPERGLYCEMIA: ICD-10-CM

## 2022-09-29 RX ORDER — INSULIN DEGLUDEC 100 U/ML
INJECTION, SOLUTION SUBCUTANEOUS
Qty: 15 ML | Refills: 4 | Status: SHIPPED | OUTPATIENT
Start: 2022-09-29 | End: 2024-03-05

## 2022-09-29 NOTE — TELEPHONE ENCOUNTER
Routing to PCP as FYI. Patient was just seen on 09/15/2022.    Thanks,  Reg Salazar RN on 9/29/2022 at 12:52 PM

## 2022-09-30 RX ORDER — FLASH GLUCOSE SENSOR
KIT MISCELLANEOUS
Qty: 2 EACH | Refills: 11 | Status: SHIPPED | OUTPATIENT
Start: 2022-09-30 | End: 2023-03-29

## 2022-09-30 RX ORDER — BLOOD SUGAR DIAGNOSTIC
STRIP MISCELLANEOUS
Qty: 400 STRIP | Refills: 11 | Status: SHIPPED | OUTPATIENT
Start: 2022-09-30

## 2022-09-30 NOTE — TELEPHONE ENCOUNTER
Patient requesting that [facility] be updated for Freestyle Abran          Hello,      We are requesting visit notes from 9/15/22 with MD Cohen to be ADDENDED TO INCLUDE INSULIN REGIMEN for Medicare Renewal of Freestyle Abran 14 Day Sensors. Per Medicare Guidelines, patient must be seen every 6 months. Pharmacy will also need a new prescription for sensors as well. Thank you.        OFFICE NOTE REQUIREMENTS FOR MEDICARE COMPLIANCE:  -Patient must be seen/clinical notes must be written in the last 6 months.  -Must state that the patient is injecting insulin 3 times daily or more (Can be written that patient is injecting 3 times daily or more with insulin pump)  -Must state that the patient is a type 1 or type 2 diabetic.     PRESCRIPTION REQUIREMENTS FOR MEDICARE COMPLIANCE:  Freestyle Abran 14 Day Sensors  SIG: Change every 14 days.  QTY: 2   REFILLS: 5     -Prescriptions must be written after the clinical note date and will only be able to be used for 6 months from the date of the clinical notes. (We will be requesting new clinical notes and prescriptions every 6 months to meet Medicare Guidelines.  -Please authorize 5 refills for SENSORS if provider approves sending a prescription for a 6-month supply. Per Medicare guidelines, pharmacy can only fill based on total # of fills authorized.        Please reach out if there are any questions.     Thank you,  [facility] Team     [facility]   [address] [place] [address]  Phone # [phone]  Fax # [phone]

## 2022-10-01 ENCOUNTER — HEALTH MAINTENANCE LETTER (OUTPATIENT)
Age: 67
End: 2022-10-01

## 2022-10-05 DIAGNOSIS — Z79.4 TYPE 2 DIABETES MELLITUS WITH DIABETIC POLYNEUROPATHY, WITH LONG-TERM CURRENT USE OF INSULIN (H): ICD-10-CM

## 2022-10-05 DIAGNOSIS — E11.42 TYPE 2 DIABETES MELLITUS WITH DIABETIC POLYNEUROPATHY, WITH LONG-TERM CURRENT USE OF INSULIN (H): ICD-10-CM

## 2022-10-05 DIAGNOSIS — R73.9 HYPERGLYCEMIA: ICD-10-CM

## 2022-10-05 RX ORDER — BLOOD SUGAR DIAGNOSTIC
STRIP MISCELLANEOUS
Qty: 100 STRIP | Refills: 11 | Status: CANCELLED | OUTPATIENT
Start: 2022-10-05

## 2022-10-05 NOTE — TELEPHONE ENCOUNTER
My medical recommendation is for her to test her sugars more frequently.  If her insurance does not cover that, the pharmacy may dispense what insurance will cover.  However I will not reduce the number of times I recommend checking based on insurance requirements.  VJ

## 2022-11-08 ASSESSMENT — ENCOUNTER SYMPTOMS
PALPITATIONS: 0
HEARTBURN: 0
HEMATOCHEZIA: 0
WEAKNESS: 0
HEMATURIA: 0
EYE PAIN: 0
NERVOUS/ANXIOUS: 0
CHILLS: 0
DIZZINESS: 0
DIARRHEA: 0
JOINT SWELLING: 0
CONSTIPATION: 0
HEADACHES: 0
FEVER: 0
FREQUENCY: 0
BREAST MASS: 0
DYSURIA: 0
COUGH: 0
ARTHRALGIAS: 0
SORE THROAT: 0
PARESTHESIAS: 0
NAUSEA: 0
ABDOMINAL PAIN: 0
MYALGIAS: 0
SHORTNESS OF BREATH: 0

## 2022-11-08 ASSESSMENT — ACTIVITIES OF DAILY LIVING (ADL): CURRENT_FUNCTION: NO ASSISTANCE NEEDED

## 2022-11-09 ENCOUNTER — OFFICE VISIT (OUTPATIENT)
Dept: FAMILY MEDICINE | Facility: CLINIC | Age: 67
End: 2022-11-09
Payer: MEDICARE

## 2022-11-09 VITALS
HEART RATE: 74 BPM | WEIGHT: 146 LBS | OXYGEN SATURATION: 99 % | HEIGHT: 63 IN | TEMPERATURE: 98.4 F | RESPIRATION RATE: 18 BRPM | SYSTOLIC BLOOD PRESSURE: 110 MMHG | DIASTOLIC BLOOD PRESSURE: 64 MMHG | BODY MASS INDEX: 25.87 KG/M2

## 2022-11-09 DIAGNOSIS — Z00.00 MEDICARE ANNUAL WELLNESS VISIT, SUBSEQUENT: Primary | ICD-10-CM

## 2022-11-09 DIAGNOSIS — M85.80 OSTEOPENIA, UNSPECIFIED LOCATION: ICD-10-CM

## 2022-11-09 DIAGNOSIS — Z12.11 SCREEN FOR COLON CANCER: ICD-10-CM

## 2022-11-09 DIAGNOSIS — Z13.220 SCREENING FOR HYPERLIPIDEMIA: ICD-10-CM

## 2022-11-09 LAB
ALBUMIN SERPL BCG-MCNC: 4.5 G/DL (ref 3.5–5.2)
ALP SERPL-CCNC: 93 U/L (ref 35–104)
ALT SERPL W P-5'-P-CCNC: 24 U/L (ref 10–35)
ANION GAP SERPL CALCULATED.3IONS-SCNC: 13 MMOL/L (ref 7–15)
AST SERPL W P-5'-P-CCNC: 27 U/L (ref 10–35)
BILIRUB SERPL-MCNC: 0.2 MG/DL
BUN SERPL-MCNC: 23.2 MG/DL (ref 8–23)
CALCIUM SERPL-MCNC: 9.8 MG/DL (ref 8.8–10.2)
CHLORIDE SERPL-SCNC: 100 MMOL/L (ref 98–107)
CHOLEST SERPL-MCNC: 159 MG/DL
CREAT SERPL-MCNC: 0.56 MG/DL (ref 0.51–0.95)
DEPRECATED CALCIDIOL+CALCIFEROL SERPL-MC: 55 UG/L (ref 20–75)
DEPRECATED HCO3 PLAS-SCNC: 28 MMOL/L (ref 22–29)
GFR SERPL CREATININE-BSD FRML MDRD: >90 ML/MIN/1.73M2
GLUCOSE SERPL-MCNC: 84 MG/DL (ref 70–99)
HBA1C MFR BLD: 8.5 % (ref 0–5.6)
HDLC SERPL-MCNC: 80 MG/DL
LDLC SERPL CALC-MCNC: 68 MG/DL
NONHDLC SERPL-MCNC: 79 MG/DL
POTASSIUM SERPL-SCNC: 4.4 MMOL/L (ref 3.4–5.3)
PROT SERPL-MCNC: 7 G/DL (ref 6.4–8.3)
SODIUM SERPL-SCNC: 141 MMOL/L (ref 136–145)
TRIGL SERPL-MCNC: 55 MG/DL

## 2022-11-09 PROCEDURE — G0439 PPPS, SUBSEQ VISIT: HCPCS | Performed by: FAMILY MEDICINE

## 2022-11-09 PROCEDURE — 82306 VITAMIN D 25 HYDROXY: CPT | Performed by: FAMILY MEDICINE

## 2022-11-09 PROCEDURE — 83036 HEMOGLOBIN GLYCOSYLATED A1C: CPT | Performed by: FAMILY MEDICINE

## 2022-11-09 PROCEDURE — 80061 LIPID PANEL: CPT | Performed by: FAMILY MEDICINE

## 2022-11-09 PROCEDURE — 36415 COLL VENOUS BLD VENIPUNCTURE: CPT | Performed by: FAMILY MEDICINE

## 2022-11-09 PROCEDURE — 80053 COMPREHEN METABOLIC PANEL: CPT | Performed by: FAMILY MEDICINE

## 2022-11-09 RX ORDER — GLUCOSAMINE HCL/CHONDROITIN SU 500-400 MG
CAPSULE ORAL
Qty: 100 STRIP | Refills: 11 | Status: SHIPPED | OUTPATIENT
Start: 2022-11-09 | End: 2023-03-29

## 2022-11-09 ASSESSMENT — ENCOUNTER SYMPTOMS
WEAKNESS: 0
JOINT SWELLING: 0
PALPITATIONS: 0
DYSURIA: 0
HEMATOCHEZIA: 0
CONSTIPATION: 0
DIARRHEA: 0
HEADACHES: 0
DIZZINESS: 0
SORE THROAT: 0
ABDOMINAL PAIN: 0
HEMATURIA: 0
BREAST MASS: 0
SHORTNESS OF BREATH: 0
FEVER: 0
NERVOUS/ANXIOUS: 0
EYE PAIN: 0
CHILLS: 0
PARESTHESIAS: 0
COUGH: 0
MYALGIAS: 0
NAUSEA: 0
FREQUENCY: 0
ARTHRALGIAS: 0
HEARTBURN: 0

## 2022-11-09 ASSESSMENT — ACTIVITIES OF DAILY LIVING (ADL): CURRENT_FUNCTION: NO ASSISTANCE NEEDED

## 2022-11-09 ASSESSMENT — PAIN SCALES - GENERAL: PAINLEVEL: NO PAIN (0)

## 2022-11-09 NOTE — PROGRESS NOTES
"SUBJECTIVE:   Mishel is a 67 year old who presents for Preventive Visit.      Patient has been advised of split billing requirements and indicates understanding: Yes  Are you in the first 12 months of your Medicare coverage?  No    Here today for annual wellness visit.  No concerns.  At last visit we tried to transition from Tresiba to glipizide but she had significant side effects including significant lethargy and rash.  Admits recent dietary indiscretion and less exercise.  She is fasting today.  She has her eye exam scheduled for November 16.  She is due for colonoscopy and bone density scan.  Immunizations up-to-date.    Healthy Habits:     In general, how would you rate your overall health?  Good    Frequency of exercise:  1 day/week    Duration of exercise:  15-30 minutes    Do you usually eat at least 4 servings of fruit and vegetables a day, include whole grains    & fiber and avoid regularly eating high fat or \"junk\" foods?  Yes    Taking medications regularly:  Yes    Medication side effects:  None    Ability to successfully perform activities of daily living:  No assistance needed    Home Safety:  No safety concerns identified    Hearing Impairment:  No hearing concerns    In the past 6 months, have you been bothered by leaking of urine?  No    In general, how would you rate your overall mental or emotional health?  Good      PHQ-2 Total Score: 0    Additional concerns today:  No    Do you feel safe in your environment? Yes    Have you ever done Advance Care Planning? (For example, a Health Directive, POLST, or a discussion with a medical provider or your loved ones about your wishes): No, advance care planning information given to patient to review.  Advanced care planning was discussed at today's visit.       Fall risk  Fallen 2 or more times in the past year?: No  Any fall with injury in the past year?: Yes    Cognitive Screening   1) Repeat 3 items (Leader, Season, Table)    2) Clock draw: NORMAL  3) " 3 item recall: Recalls 3 objects  Results: 3 items recalled: COGNITIVE IMPAIRMENT LESS LIKELY    Mini-CogTM Copyright S Markus. Licensed by the author for use in [facility]; reprinted with permission (ashlee@[facility]). All rights reserved.          Reviewed and updated as needed this visit by clinical staff   Tobacco  Allergies  Meds              Reviewed and updated as needed this visit by Provider                 Social History     Tobacco Use     Smoking status: Never     Smokeless tobacco: Never   Substance Use Topics     Alcohol use: No         Alcohol Use 11/8/2022   Prescreen: >3 drinks/day or >7 drinks/week? Not Applicable         Current providers sharing in care for this patient include:   Patient Care Team:  Shante Cohen MD as PCP - General  Elizabeth Underwood PharmD as Pharmacist (Pharmacist)  Shante Cohen MD as Assigned PCP    The following health maintenance items are reviewed in Epic and correct as of today:  Health Maintenance   Topic Date Due     BMP  02/24/2022     LIPID  02/24/2022     COLORECTAL CANCER SCREENING  06/11/2022     MEDICARE ANNUAL WELLNESS VISIT  07/07/2022     EYE EXAM  10/01/2022     DEXA  12/30/2022     MICROALBUMIN  01/19/2023     A1C  03/15/2023     DIABETIC FOOT EXAM  09/15/2023     ANNUAL REVIEW OF HM ORDERS  09/15/2023     FALL RISK ASSESSMENT  11/09/2023     MAMMO SCREENING  05/27/2024     ADVANCE CARE PLANNING  07/07/2026     DTAP/TDAP/TD IMMUNIZATION (3 - Td or Tdap) 02/25/2029     HEPATITIS C SCREENING  Completed     PHQ-2 (once per calendar year)  Completed     INFLUENZA VACCINE  Completed     Pneumococcal Vaccine: 65+ Years  Completed     ZOSTER IMMUNIZATION  Completed     COVID-19 Vaccine  Completed     IPV IMMUNIZATION  Aged Out     MENINGITIS IMMUNIZATION  Aged Out     Lab work is in process  Labs reviewed in EPIC  BP Readings from Last 3 Encounters:   11/09/22 110/64   09/15/22 104/70   01/19/22 100/64    Wt Readings from Last 3  Encounters:   11/09/22 66.2 kg (146 lb)   09/15/22 66.2 kg (146 lb)   01/19/22 66.7 kg (147 lb)                  Patient Active Problem List   Diagnosis     Hypercholesterolemia     Factor V Leiden Mutation     Type 2 diabetes mellitus with diabetic polyneuropathy, with long-term current use of insulin (H)     Pulmonary hypertension, mild (H)     Tricuspid valve regurgitation, nonrheumatic     Osteopenia, unspecified location     No past surgical history on file.    Social History     Tobacco Use     Smoking status: Never     Smokeless tobacco: Never   Substance Use Topics     Alcohol use: No     Family History   Problem Relation Age of Onset     Factor V Leiden deficiency Mother          Current Outpatient Medications   Medication Sig Dispense Refill     aspirin 325 MG tablet [ASPIRIN 325 MG TABLET] Take 325 mg by mouth daily.       atorvastatin (LIPITOR) 10 MG tablet Take 1 tablet by mouth once daily 90 tablet 4     blood glucose (ONETOUCH VERIO IQ) test strip USE TO TEST BLOOD SUGAR 4 TIMES A DAY AS NEEDED 400 strip 11     Continuous Blood Gluc  (FREESTYLE ELLYN 14 DAY READER) LAZARO Use to read blood sugars as per 's instructions. 1 each 0     Continuous Blood Gluc Sensor (FREESTYLE ELLYN 14 DAY SENSOR) MISC Change every 14 days. Use per manufacture's instructions to check glucose daily 2 each 11     flash glucose scanning reader (FREESTYLE ELLYN 14 DAY READER) Misc [FLASH GLUCOSE SCANNING READER (FREESTYLE ELLYN 14 DAY READER) MISC] Use 1 Units As Directed daily. Use reader to scan glucose  sensor every 8 hours 1 each 0     generic lancets [GENERIC LANCETS] Accu-Chek Multiclix Lancets Miscellaneous  Test BG 4x daily 100 each 11     Glucose Blood (BLOOD GLUCOSE TEST STRIPS) STRP [BLOOD GLUCOSE TEST (CONTOUR NEXT TEST STRIPS) STRIPS] Use to test blood sugars four times daily. Dispense brand per patient's insurance at pharmacy discretion. 100 strip 11     insulin degludec (TRESIBA FLEXTOUCH) 100  "UNIT/ML pen INJECT 6 UNITS SUBCUTANEOUSLY ONCE DAILY 15 mL 4     INVOKANA 100 MG tablet TAKE 1 TABLET BY MOUTH ONCE DAILY BEFORE BREAKFAST 90 tablet 3     lancets (ONETOUCH DELICA LANCETS) 33 gauge Misc [LANCETS (ONETOUCH DELICA LANCETS) 33 GAUGE MISC] 1 each by In Vitro route 4 (four) times a day. 100 each 3     metFORMIN (GLUCOPHAGE) 500 MG tablet TAKE 2 TABLETS BY MOUTH TWICE DAILY WITH FOOD 360 tablet 4     multivitamin (MULTIPLE VITAMINS ORAL) [MULTIVITAMIN (MULTIPLE VITAMINS ORAL)] Take by mouth. Centrum silver       pen needle, diabetic (BD ULTRA-FINE PRINCESS PEN NEEDLE) 32 gauge x 5/32\" Ndle [PEN NEEDLE, DIABETIC (BD ULTRA-FINE PRINCESS PEN NEEDLE) 32 GAUGE X 5/32\" NDLE] USE TO INJECT TRESIBA ONCE DAILY 100 each 11     potassium chloride ER (KLOR-CON M) 20 MEQ CR tablet Take 1 tablet by mouth twice daily 180 tablet 3     No Known Allergies  Recent Labs   Lab Test 09/15/22  0704 01/19/22  0653 10/13/21  0705 07/07/21  0717 02/24/21  0730 11/18/20  0701 02/26/20  0843 03/06/19  0822 02/21/19  0830 02/21/19  0717 12/11/17  0956   A1C 7.9* 7.7* 8.0*   < >  --    < >  --    < >  --    < >  --    LDL  --   --   --   --  61  --  81  --   --   --  78   HDL  --   --   --   --  65  --  66  --   --   --  68   TRIG  --   --   --   --  71  --  53  --   --   --  60   ALT  --   --   --   --  23  --  33  --  27  --   --    CR  --   --   --   --  0.66  --  0.66   < > 0.73  --   --    GFRESTIMATED  --   --   --   --  >60  --  >60   < > >60  --   --    GFRESTBLACK  --   --   --   --  >60  --  >60   < > >60  --   --    POTASSIUM  --   --   --   --  4.2  --  4.9   < > 3.1*  --   --     < > = values in this interval not displayed.          Breast CA Risk Assessment (FHS-7) 11/8/2022   Do you have a family history of breast, colon, or ovarian cancer? No / Unknown       Pertinent mammograms are reviewed under the imaging tab.    Review of Systems   Constitutional: Negative for chills and fever.   HENT: Negative for congestion, ear pain, " "hearing loss and sore throat.    Eyes: Negative for pain and visual disturbance.   Respiratory: Negative for cough and shortness of breath.    Cardiovascular: Negative for chest pain, palpitations and peripheral edema.   Gastrointestinal: Negative for abdominal pain, constipation, diarrhea, heartburn, hematochezia and nausea.   Breasts:  Negative for tenderness, breast mass and discharge.   Genitourinary: Negative for dysuria, frequency, genital sores, hematuria, pelvic pain, urgency, vaginal bleeding and vaginal discharge.   Musculoskeletal: Negative for arthralgias, joint swelling and myalgias.   Skin: Negative for rash.   Neurological: Negative for dizziness, weakness, headaches and paresthesias.   Psychiatric/Behavioral: Negative for mood changes. The patient is not nervous/anxious.          OBJECTIVE:   /64   Pulse 74   Temp 98.4  F (36.9  C) (Oral)   Resp 18   Ht 1.6 m (5' 3\")   Wt 66.2 kg (146 lb)   SpO2 99%   BMI 25.86 kg/m   Estimated body mass index is 25.86 kg/m  as calculated from the following:    Height as of this encounter: 1.6 m (5' 3\").    Weight as of this encounter: 66.2 kg (146 lb).  Physical Exam  GENERAL APPEARANCE: healthy, alert and no distress  EYES: Eyes grossly normal to inspection, PERRL and conjunctivae and sclerae normal  HENT: ear canals and TM's normal, nose and mouth without ulcers or lesions, oropharynx clear and oral mucous membranes moist  NECK: no adenopathy, no asymmetry, masses, or scars and thyroid normal to palpation  RESP: lungs clear to auscultation - no rales, rhonchi or wheezes  BREAST: normal without masses, tenderness or nipple discharge and no palpable axillary masses or adenopathy  CV: regular rate and rhythm, normal S1 S2, no S3 or S4, no murmur, click or rub, no peripheral edema and peripheral pulses strong  ABDOMEN: soft, nontender, no hepatosplenomegaly, no masses and bowel sounds normal  MS: no musculoskeletal defects are noted and gait is age " "appropriate without ataxia  SKIN: no suspicious lesions or rashes  NEURO: Normal strength and tone, sensory exam grossly normal, mentation intact and speech normal  PSYCH: mentation appears normal and affect normal/bright    Diagnostic Test Results:  Labs reviewed in Epic    ASSESSMENT / PLAN:     Medicare annual wellness visit, subsequent  At today's visit, we discussed lifestyle interventions to promote self-management and wellness, including maintenance of a healthy weight, healthy diet, regular physical activity and exercise, and falls prevention.  Up-to-date with mammogram screening.  She is due for colon cancer screening, order placed.  She is due for follow-up bone density screening, order placed.  She is up-to-date with immunizations.  Information provided regarding [facility] directives.    Osteopenia, unspecified location  Check vitamin D level today.  Order placed for screening follow-up bone density scan.  - Vitamin D Deficiency    Screen for colon cancer  - Colonoscopy Screening  Referral; Future    Screening for hyperlipidemia  - Lipid panel reflex to direct LDL Non-fasting     Patient has been advised of split billing requirements and indicates understanding: Yes      COUNSELING:  Reviewed preventive health counseling, as reflected in patient instructions    Estimated body mass index is 25.86 kg/m  as calculated from the following:    Height as of this encounter: 1.6 m (5' 3\").    Weight as of this encounter: 66.2 kg (146 lb).        She reports that she has never smoked. She has never used smokeless tobacco.      Appropriate preventive services were discussed with this patient, including applicable screening as appropriate for cardiovascular disease, diabetes, osteopenia/osteoporosis, and glaucoma.  As appropriate for age/gender, discussed screening for colorectal cancer, prostate cancer, breast cancer, and cervical cancer. Checklist reviewing preventive services available has been " given to the patient.    Reviewed patients plan of care and provided an AVS. The Basic Care Plan (routine screening as documented in Health Maintenance) for Mishel meets the Care Plan requirement. This Care Plan has been established and reviewed with the Patient.    Counseling Resources:  ATP IV Guidelines  Pooled Cohorts Equation Calculator  Breast Cancer Risk Calculator  Breast Cancer: Medication to Reduce Risk  FRAX Risk Assessment  ICSI Preventive Guidelines  Dietary Guidelines for Americans, 2010  [employer] MyPlate  ASA Prophylaxis  Lung CA Screening    Shante Cohen MD  [facility]    Identified Health Risks:    She is at risk for lack of exercise and has been provided with information to increase physical activity for the benefit of her well-being.

## 2022-11-09 NOTE — PATIENT INSTRUCTIONS
Patient Education   Personalized Prevention Plan  You are due for the preventive services outlined below.  Your care team is available to assist you in scheduling these services.  If you have already completed any of these items, please share that information with your care team to update in your medical record.  Health Maintenance Due   Topic Date Due     Basic Metabolic Panel  02/24/2022     Cholesterol Lab  02/24/2022     Colorectal Cancer Screening  06/11/2022     Eye Exam  10/01/2022       Exercise for a Healthier Heart  You may wonder how you can improve the health of your heart. If you re thinking about exercise, you re on the right track. You don t need to become an athlete. But you do need a certain amount of brisk exercise to help strengthen your heart. If you have been diagnosed with a heart condition, your healthcare provider may advise exercise to help stabilize your condition. To help make exercise a habit, choose safe, fun activities.      Exercise with a friend. When activity is fun, you're more likely to stick with it.   Before you start  Check with your healthcare provider before starting an exercise program. This is especially important if you have not been active for a while. It's also important if you have a long-term (chronic) health problem such as heart disease, diabetes, or obesity. Or if you are at high risk for having these problems.   Why exercise?  Exercising regularly offers many healthy rewards. It can help you do all of the following:     Improve your blood cholesterol level to help prevent further heart trouble    Lower your blood pressure to help prevent a stroke or heart attack    Control diabetes, or reduce your risk of getting this disease    Improve your heart and lung function    Reach and stay at a healthy weight    Make your muscles stronger so you can stay active    Prevent falls and fractures by slowing the loss of bone mass (osteoporosis)    Manage stress  better    Reduce your blood pressure    Improve your sense of self and your body image  Exercise tips      Ease into your routine. Set small goals. Then build on them. If you are not sure what your activity level should be, talk with your healthcare provider first before starting an exercise routine.    Exercise on most days. Aim for a total of 150 minutes (2 hours and 30 minutes) or more of moderate-intensity aerobic activity each week. Or 75 minutes (1 hour and 15 minutes) or more of vigorous-intensity aerobic activity each week. Or try for a combination of both. Moderate activity means that you breathe heavier and your heart rate increases but you can still talk. Think about doing 40 minutes of moderate exercise, 3 to 4 times a week. For best results, activity should last for about 40 minutes to lower blood pressure and cholesterol. It's OK to work up to the 40-minute period over time. Examples of moderate-intensity activity are walking 1 mile in 15 minutes. Or doing 30 to 45 minutes of yard work.    Step up your daily activity level.  Along with your exercise program, try being more active the whole day. Walk instead of drive. Or park further away so that you take more steps each day. Do more household tasks or yard work. You may not be able to meet the advised mount of physical activity. But doing some moderate- or vigorous-intensity aerobic activity can help reduce your risk for heart disease. Your healthcare provider can help you figure out what is best for you.    Choose 1 or more activities you enjoy.  Walking is one of the easiest things you can do. You can also try swimming, riding a bike, dancing, or taking an exercise class.    When to call your healthcare provider  Call your healthcare provider if you have any of these:     Chest pain or feel dizzy or lightheaded    Burning, tightness, pressure, or heaviness in your chest, neck, shoulders, back, or arms    Abnormal shortness of breath    More joint or  muscle pain    A very fast or irregular heartbeat (palpitations)  Barby last reviewed this educational content on 7/1/2019 2000-2021 [employer]. All rights reserved. This information is not intended as a substitute for professional medical care. Always follow your healthcare professional's instructions.

## 2022-11-17 ENCOUNTER — TRANSFERRED RECORDS (OUTPATIENT)
Dept: HEALTH INFORMATION MANAGEMENT | Facility: CLINIC | Age: 67
End: 2022-11-17

## 2022-11-17 LAB — RETINOPATHY: POSITIVE

## 2023-03-29 ENCOUNTER — OFFICE VISIT (OUTPATIENT)
Dept: FAMILY MEDICINE | Facility: CLINIC | Age: 68
End: 2023-03-29
Attending: FAMILY MEDICINE
Payer: MEDICARE

## 2023-03-29 VITALS
HEIGHT: 66 IN | TEMPERATURE: 98 F | WEIGHT: 145.1 LBS | DIASTOLIC BLOOD PRESSURE: 60 MMHG | OXYGEN SATURATION: 99 % | SYSTOLIC BLOOD PRESSURE: 102 MMHG | HEART RATE: 84 BPM | RESPIRATION RATE: 16 BRPM | BODY MASS INDEX: 23.32 KG/M2

## 2023-03-29 DIAGNOSIS — Z78.0 ASYMPTOMATIC POSTMENOPAUSAL STATUS: ICD-10-CM

## 2023-03-29 DIAGNOSIS — Z12.11 SCREEN FOR COLON CANCER: ICD-10-CM

## 2023-03-29 DIAGNOSIS — Z79.4 TYPE 2 DIABETES MELLITUS WITH DIABETIC POLYNEUROPATHY, WITH LONG-TERM CURRENT USE OF INSULIN (H): Primary | ICD-10-CM

## 2023-03-29 DIAGNOSIS — D68.59 PRIMARY HYPERCOAGULABLE STATE (H): ICD-10-CM

## 2023-03-29 DIAGNOSIS — M85.80 OSTEOPENIA, UNSPECIFIED LOCATION: ICD-10-CM

## 2023-03-29 DIAGNOSIS — I27.20 PULMONARY HYPERTENSION, MILD (H): ICD-10-CM

## 2023-03-29 DIAGNOSIS — E11.42 TYPE 2 DIABETES MELLITUS WITH DIABETIC POLYNEUROPATHY, WITH LONG-TERM CURRENT USE OF INSULIN (H): Primary | ICD-10-CM

## 2023-03-29 LAB
CREAT UR-MCNC: 77.1 MG/DL
HBA1C MFR BLD: 12.5 % (ref 0–5.6)
HOLD SPECIMEN: NORMAL
MICROALBUMIN UR-MCNC: <12 MG/L
MICROALBUMIN/CREAT UR: NORMAL MG/G{CREAT}

## 2023-03-29 PROCEDURE — 36415 COLL VENOUS BLD VENIPUNCTURE: CPT | Performed by: FAMILY MEDICINE

## 2023-03-29 PROCEDURE — 82570 ASSAY OF URINE CREATININE: CPT | Performed by: FAMILY MEDICINE

## 2023-03-29 PROCEDURE — 82043 UR ALBUMIN QUANTITATIVE: CPT | Performed by: FAMILY MEDICINE

## 2023-03-29 PROCEDURE — 99213 OFFICE O/P EST LOW 20 MIN: CPT | Performed by: FAMILY MEDICINE

## 2023-03-29 PROCEDURE — 83036 HEMOGLOBIN GLYCOSYLATED A1C: CPT | Performed by: FAMILY MEDICINE

## 2023-03-29 RX ORDER — FLASH GLUCOSE SENSOR
1 KIT MISCELLANEOUS DAILY
Qty: 1 EACH | Refills: 0 | Status: CANCELLED | OUTPATIENT
Start: 2023-03-29

## 2023-03-29 RX ORDER — GLUCOSAMINE HCL/CHONDROITIN SU 500-400 MG
CAPSULE ORAL
Qty: 100 STRIP | Refills: 11 | Status: SHIPPED | OUTPATIENT
Start: 2023-03-29

## 2023-03-29 RX ORDER — GLUCOSAMINE HCL/CHONDROITIN SU 500-400 MG
CAPSULE ORAL
Qty: 100 STRIP | Refills: 11 | Status: CANCELLED | OUTPATIENT
Start: 2023-03-29

## 2023-03-29 RX ORDER — FLASH GLUCOSE SENSOR
KIT MISCELLANEOUS
Qty: 7 EACH | Refills: 4 | Status: SHIPPED | OUTPATIENT
Start: 2023-03-29 | End: 2023-09-15

## 2023-03-29 ASSESSMENT — ENCOUNTER SYMPTOMS
DYSURIA: 0
DIARRHEA: 0
DIZZINESS: 0
PARESTHESIAS: 0
CHILLS: 0
HEADACHES: 0
JOINT SWELLING: 0
FEVER: 0
BREAST MASS: 0
NERVOUS/ANXIOUS: 0
CONSTIPATION: 0
SHORTNESS OF BREATH: 0
EYE PAIN: 0
COUGH: 0
PALPITATIONS: 0
SORE THROAT: 0
ABDOMINAL PAIN: 0
ARTHRALGIAS: 0
NAUSEA: 0
HEMATURIA: 0
HEMATOCHEZIA: 0
WEAKNESS: 0
HEARTBURN: 0
MYALGIAS: 0
FREQUENCY: 0

## 2023-03-29 ASSESSMENT — ACTIVITIES OF DAILY LIVING (ADL): CURRENT_FUNCTION: NO ASSISTANCE NEEDED

## 2023-03-29 ASSESSMENT — PAIN SCALES - GENERAL: PAINLEVEL: NO PAIN (0)

## 2023-03-29 NOTE — PROGRESS NOTES
Assessment & Plan     Type 2 diabetes mellitus with diabetic polyneuropathy, with long-term current use of insulin (H)  Significantly worsened control related to significant dietary indiscretion and greatly reduced activity level.  Blood sugars were not yet at goal even with excellent lifestyle habits.  We discussed options.  She like to refocus efforts on lifestyle habits first to see how she does.  Unfortunately is only allowed to check her blood sugar once daily per insurance coverage of test strips, so encouraged her to begin doing this again.  Continue Tresiba and Invokana at current doses.  We will reach out to her by phone in 1 month to check in on her habits to see how she is doing.  Follow-up with me in clinic in 3 months.  We will check urine microalbumin today.  - Albumin Random Urine Quantitative with Creat Ratio; Future  - Albumin Random Urine Quantitative with Creat Ratio  - Continuous Blood Gluc Sensor (FREESTYLE ELLYN 14 DAY SENSOR) MISC; Change every 14 days. Use per manufacture's instructions to check glucose daily  - Glucose Blood (BLOOD GLUCOSE TEST STRIPS) STRP; Use to test blood sugars once daily. Dispense brand per patient's insurance at pharmacy discretion.    Screen for colon cancer  Order placed for screening colonoscopy.  - Colonoscopy Screening  Referral; Future    Osteopenia, unspecified location  Asymptomatic postmenopausal status  Order placed for follow-up bone density scan.  - DEXA HIP/PELVIS/SPINE - Future; Future    Pulmonary hypertension, mild (H)  This remains well controlled and currently asymptomatic.    Primary hypercoagulable state (H)  No evidence of recurrence of venous thromboembolism.                   Shante Cohen MD  [facility] 67 year [facility], presenting for the following health issues:  Diabetes (fasting)    Additional Questions 9/15/2022   Roomed by Pricilla LAINEZ     Here today for follow-up of diabetes.   "Admits that she has not been watching her diet very closely, doing well with meals but having quite a bit more carbohydrate laden snacks and desserts.  Significantly less activity as well.  Has found herself more irritable and crabby with others, just not feeling as well.  Remains compliant with Tresiba and Invokana.  Chronic peripheral neuropathy is unchanged, comes and goes.  She has not had any swelling of her legs, shortness of breath, or other signs of DVT or worsened pulmonary hypertension.  She is due for colonoscopy and bone density scan follow-up.    Healthy Habits:     In general, how would you rate your overall health?  Good    Frequency of exercise:  1 day/week    Duration of exercise:  Less than 15 minutes    Do you usually eat at least 4 servings of fruit and vegetables a day, include whole grains    & fiber and avoid regularly eating high fat or \"junk\" foods?  No    Taking medications regularly:  Yes    Medication side effects:  None    Ability to successfully perform activities of daily living:  No assistance needed    Home Safety:  No safety concerns identified    Hearing Impairment:  No hearing concerns    In the past 6 months, have you been bothered by leaking of urine?  No    In general, how would you rate your overall mental or emotional health?  Good      PHQ-2 Total Score: 0               Review of Systems   Constitutional: Negative for chills and fever.   HENT: Negative for congestion, ear pain, hearing loss and sore throat.    Eyes: Negative for pain and visual disturbance.   Respiratory: Negative for cough and shortness of breath.    Cardiovascular: Negative for chest pain, palpitations and peripheral edema.   Gastrointestinal: Negative for abdominal pain, constipation, diarrhea, heartburn, hematochezia and nausea.   Breasts:  Negative for tenderness, breast mass and discharge.   Genitourinary: Negative for dysuria, frequency, genital sores, hematuria, pelvic pain, urgency, vaginal bleeding " "and vaginal discharge.   Musculoskeletal: Negative for arthralgias, joint swelling and myalgias.   Skin: Negative for rash.   Neurological: Negative for dizziness, weakness, headaches and paresthesias.   Psychiatric/Behavioral: Positive for mood changes. The patient is not nervous/anxious.             Objective    /60   Pulse 84   Temp 98  F (36.7  C) (Oral)   Resp 16   Ht 1.67 m (5' 5.75\")   Wt 65.8 kg (145 lb 1.6 oz)   LMP  (LMP Unknown)   SpO2 99%   BMI 23.60 kg/m    Body mass index is 23.6 kg/m .  Physical Exam   Alert and pleasant.  Heart with regular rate and rhythm.  Lungs clear.  Extremities without edema.    Office Visit on 03/29/2023   Component Date Value Ref Range Status     Hemoglobin A1C 03/29/2023 12.5 (H)  0.0 - 5.6 % Final                          "

## 2023-04-11 ENCOUNTER — ANCILLARY PROCEDURE (OUTPATIENT)
Dept: BONE DENSITY | Facility: CLINIC | Age: 68
End: 2023-04-11
Attending: FAMILY MEDICINE
Payer: MEDICARE

## 2023-04-11 DIAGNOSIS — Z78.0 POSTMENOPAUSAL STATUS: ICD-10-CM

## 2023-04-11 DIAGNOSIS — Z78.0 ASYMPTOMATIC POSTMENOPAUSAL STATUS: ICD-10-CM

## 2023-04-11 DIAGNOSIS — M85.80 OSTEOPENIA, UNSPECIFIED LOCATION: ICD-10-CM

## 2023-04-11 PROCEDURE — 77081 DXA BONE DENSITY APPENDICULR: CPT | Mod: TC | Performed by: STUDENT IN AN ORGANIZED HEALTH CARE EDUCATION/TRAINING PROGRAM

## 2023-04-11 PROCEDURE — 77080 DXA BONE DENSITY AXIAL: CPT | Mod: TC | Performed by: STUDENT IN AN ORGANIZED HEALTH CARE EDUCATION/TRAINING PROGRAM

## 2023-04-12 DIAGNOSIS — E87.6 HYPOKALEMIA: ICD-10-CM

## 2023-04-13 RX ORDER — POTASSIUM CHLORIDE 1500 MG/1
TABLET, EXTENDED RELEASE ORAL
Qty: 180 TABLET | Refills: 2 | Status: SHIPPED | OUTPATIENT
Start: 2023-04-13 | End: 2024-03-04

## 2023-04-13 NOTE — TELEPHONE ENCOUNTER
"Last Written Prescription Date:  3/31/22  Last Fill Quantity: 180,  # refills: 3   Last office visit provider:  3/29/23     Requested Prescriptions   Pending Prescriptions Disp Refills     potassium chloride ER (KLOR-CON M) 20 MEQ CR tablet [Pharmacy Med Name: Potassium Chloride Arlette ER 20 MEQ Oral Tablet Extended Release] 180 tablet 0     Sig: TAKE 1  BY MOUTH TWICE DAILY       Potassium Supplements Protocol Passed - 4/12/2023  4:19 PM        Passed - Recent (12 mo) or future (30 days) visit within the authorizing provider's department     Patient has had an office visit with the authorizing provider or a provider within the authorizing providers department within the previous 12 mos or has a future within next 30 days. See \"Patient Info\" tab in inbasket, or \"Choose Columns\" in Meds & Orders section of the refill encounter.              Passed - Medication is active on med list        Passed - Patient is age 18 or older        Passed - Normal serum potassium in past 12 months     Recent Labs   Lab Test 11/09/22  0741   POTASSIUM 4.4                         DARRIUS MCKINNEY RN 04/13/23 1:00 PM  "

## 2023-05-02 ENCOUNTER — TELEPHONE (OUTPATIENT)
Dept: FAMILY MEDICINE | Facility: CLINIC | Age: 68
End: 2023-05-02
Payer: MEDICARE

## 2023-05-02 NOTE — TELEPHONE ENCOUNTER
"Per Dr Conroy:  \"Please call patient.  At last visit 3/29/23, we discussed reaching out to her in a month to see how she is doing with diet and exercise specifically, she began walking again?  How is she doing with eating chips, snacks, and other desserts?\"     Left message to return call. Please route to RN queue when pt returns call.    BRITTNI Luz, RN  [facility]      "

## 2023-05-03 NOTE — TELEPHONE ENCOUNTER
Called patient and left message to call back. Please route to RN queue when the patient returns the call.    Nidhi Rabago RN

## 2023-05-05 NOTE — TELEPHONE ENCOUNTER
"Nurse SBAR    Situation:     See previous nursing note on 5/2/23    Background:     From office visit notes with Dr. Cohen on 3/29/23:    \"Significantly worsened control related to significant dietary indiscretion and greatly reduced activity level.  Blood sugars were not yet at goal even with excellent lifestyle habits.  We discussed options.  She like to refocus efforts on lifestyle habits first to see how she does.  Unfortunately is only allowed to check her blood sugar once daily per insurance coverage of test strips, so encouraged her to begin doing this again.  Continue Tresiba and Invokana at current doses.  We will reach out to her by phone in 1 month to check in on her habits to see how she is doing.  Follow-up with me in clinic in 3 months.\"    Hemoglobin A1C was 12.5 on 3/29/23.    Recommendation:    Left message to return call. Please route to the RN queue when the patient returns the call.     Nidhi Rabago, RN, BSN  [facility]    "

## 2023-05-08 NOTE — TELEPHONE ENCOUNTER
Attempted to call patient twice. Phone rang and was disconnected twice. Patient has been previously given the clinic phone number to call back regarding Dr. Cohen's message.     If patient returns the call, please route to the RN queue to gather more information.    [employer] message sent with provider's message to the patient.     Nidhi Rabago RN, BSN  [facility]

## 2023-08-24 ENCOUNTER — OFFICE VISIT (OUTPATIENT)
Dept: FAMILY MEDICINE | Facility: CLINIC | Age: 68
End: 2023-08-24
Payer: MEDICARE

## 2023-08-24 VITALS
WEIGHT: 138.2 LBS | BODY MASS INDEX: 24.49 KG/M2 | SYSTOLIC BLOOD PRESSURE: 112 MMHG | HEIGHT: 63 IN | TEMPERATURE: 98 F | HEART RATE: 90 BPM | OXYGEN SATURATION: 99 % | DIASTOLIC BLOOD PRESSURE: 64 MMHG | RESPIRATION RATE: 16 BRPM

## 2023-08-24 DIAGNOSIS — E11.42 TYPE 2 DIABETES MELLITUS WITH DIABETIC POLYNEUROPATHY, WITH LONG-TERM CURRENT USE OF INSULIN (H): Primary | ICD-10-CM

## 2023-08-24 DIAGNOSIS — Z12.11 SCREEN FOR COLON CANCER: ICD-10-CM

## 2023-08-24 DIAGNOSIS — Z79.4 TYPE 2 DIABETES MELLITUS WITH DIABETIC POLYNEUROPATHY, WITH LONG-TERM CURRENT USE OF INSULIN (H): Primary | ICD-10-CM

## 2023-08-24 LAB
ALBUMIN SERPL BCG-MCNC: 4.4 G/DL (ref 3.5–5.2)
ALP SERPL-CCNC: 83 U/L (ref 35–104)
ALT SERPL W P-5'-P-CCNC: 18 U/L (ref 0–50)
ANION GAP SERPL CALCULATED.3IONS-SCNC: 16 MMOL/L (ref 7–15)
AST SERPL W P-5'-P-CCNC: 21 U/L (ref 0–45)
BILIRUB SERPL-MCNC: 0.4 MG/DL
BUN SERPL-MCNC: 15.6 MG/DL (ref 8–23)
CALCIUM SERPL-MCNC: 10.7 MG/DL (ref 8.8–10.2)
CHLORIDE SERPL-SCNC: 98 MMOL/L (ref 98–107)
CREAT SERPL-MCNC: 0.57 MG/DL (ref 0.51–0.95)
DEPRECATED HCO3 PLAS-SCNC: 27 MMOL/L (ref 22–29)
ERYTHROCYTE [DISTWIDTH] IN BLOOD BY AUTOMATED COUNT: 14.1 % (ref 10–15)
GFR SERPL CREATININE-BSD FRML MDRD: >90 ML/MIN/1.73M2
GLUCOSE SERPL-MCNC: 131 MG/DL (ref 70–99)
HBA1C MFR BLD: 13.6 % (ref 0–5.6)
HCT VFR BLD AUTO: 39 % (ref 35–47)
HGB BLD-MCNC: 12.7 G/DL (ref 11.7–15.7)
HOLD SPECIMEN: NORMAL
LIPASE SERPL-CCNC: 18 U/L (ref 13–60)
MCH RBC QN AUTO: 25.2 PG (ref 26.5–33)
MCHC RBC AUTO-ENTMCNC: 32.6 G/DL (ref 31.5–36.5)
MCV RBC AUTO: 77 FL (ref 78–100)
PLATELET # BLD AUTO: 342 10E3/UL (ref 150–450)
POTASSIUM SERPL-SCNC: 4.9 MMOL/L (ref 3.4–5.3)
PROT SERPL-MCNC: 6.4 G/DL (ref 6.4–8.3)
RBC # BLD AUTO: 5.04 10E6/UL (ref 3.8–5.2)
SODIUM SERPL-SCNC: 141 MMOL/L (ref 136–145)
WBC # BLD AUTO: 7 10E3/UL (ref 4–11)

## 2023-08-24 PROCEDURE — 83690 ASSAY OF LIPASE: CPT | Performed by: FAMILY MEDICINE

## 2023-08-24 PROCEDURE — 36415 COLL VENOUS BLD VENIPUNCTURE: CPT | Performed by: FAMILY MEDICINE

## 2023-08-24 PROCEDURE — 83036 HEMOGLOBIN GLYCOSYLATED A1C: CPT | Performed by: FAMILY MEDICINE

## 2023-08-24 PROCEDURE — 85027 COMPLETE CBC AUTOMATED: CPT | Performed by: FAMILY MEDICINE

## 2023-08-24 PROCEDURE — 99214 OFFICE O/P EST MOD 30 MIN: CPT | Performed by: FAMILY MEDICINE

## 2023-08-24 PROCEDURE — 80053 COMPREHEN METABOLIC PANEL: CPT | Performed by: FAMILY MEDICINE

## 2023-08-24 ASSESSMENT — PAIN SCALES - GENERAL: PAINLEVEL: NO PAIN (0)

## 2023-08-24 NOTE — PROGRESS NOTES
Assessment & Plan     Type 2 diabetes mellitus with diabetic polyneuropathy, with long-term current use of insulin (H)  Reported morning sugars, well not at goal, certainly do not reflect an A1c of 13.6 seen today.  Based on her report of her healthy habits as well, while she has significant opportunity for improvement, I think it is exceedingly unlikely that poor lifestyle habits are causing her A1c to be elevated at 13.6.  I discussed this with her.  She is taking a minute dose of Tresiba currently.  I am hesitant to adjust as we do not have a good sense of what her sugars are doing.  I am highly suspicious of significant postmeal hyperglycemia, and I suspect she may benefit from mealtime insulin.  I referred to diabetes education to help with this portion.  In addition, I placed a referral to endocrinology.  With the sudden significant elevation in her A1c being on lifestyle habits, I am suspicious that she has a variant form of diabetes, perhaps latent autoimmune diabetes, other diabetes caused by poor insulin production.  She is not otherwise having any symptoms suggestive of pancreatic cancer, exocrine pancreatic dysfunction, etc.  I'd like endocrinology's input as far as further evaluation and ongoing management of her diabetes.  - Extra Tube; Standing  - Extra Tube  - Adult Endocrinology  Referral; Future  - [facility] Adult Diabetes Educator Referral; Future  - Comprehensive metabolic panel; Future  - CBC with platelets; Future  - Lipase; Future  - CBC with platelets  - Comprehensive metabolic panel  - Lipase                 Shante Cohen MD  [facility] is a 68 year old, presenting for the following health issues:  Diabetes (fasting)      8/24/2023     8:17 AM   Additional Questions   Roomed by ashia       Here for follow-up of type 2 diabetes.  Admits that she has been irregular with exercise, struggling with the motivation exercise.  She is also been  "eating a few more carbohydrates like broths with a bun intermittently, more chips, intermittent cookies.  In reviewing this, she does have some increasing carbohydrate intake, higher than goal, but certainly not excessive.  She has not been checking her sugars very often even with use of sensor.  Notes that in the morning today her sugar was 148, occasionally is over 200.  The highest she has seen is in the 250s.  She denies any low sugars.  She has Been compliant with her medications, takes 60 units of Tresiba currently.  At today's visit, she is really coming from the perspective of participating in adequately in the care of her diabetes and feeling though her poor choices are leading to poorly controlled diabetes.  She denies unintentional weight loss that she has lost a little weight since last visit.  She has not had any abdominal pain, nausea, vomiting, early satiety, diarrhea.    History of Present Illness       Diabetes:   She presents for follow up of diabetes.  She is checking home blood glucose a few times a month.   She checks blood glucose before meals.  Blood glucose is sometimes over 200 and sometimes under 70. She is aware of hypoglycemia symptoms including blurred vision and other.   She is concerned about blood sugar frequently over 200.   She is having burning in feet.            She eats 4 or more servings of fruits and vegetables daily.She consumes 1 sweetened beverage(s) daily.She exercises with enough effort to increase her heart rate 9 or less minutes per day.  She exercises with enough effort to increase her heart rate 3 or less days per week.   She is taking medications regularly.               Review of Systems         Objective    /64   Pulse 90   Temp 98  F (36.7  C) (Oral)   Resp 16   Ht 1.594 m (5' 2.75\")   Wt 62.7 kg (138 lb 3.2 oz)   LMP  (LMP Unknown)   SpO2 99%   BMI 24.68 kg/m    Body mass index is 24.68 kg/m .  Physical Exam   Alert and pleasant.  Mucous membranes " moist.  Heart with regular rate and rhythm.  Lungs clear. .Diabetic foot exam: normal DP and PT pulses, no trophic changes or ulcerative lesions, and normal sensory exam                        Answers submitted by the patient for this visit:  Diabetes Visit (Submitted on 8/23/2023)  Chief Complaint: Chronic problems general questions HPI Form  Frequency of checking blood sugars:: a few times a month  What time of day are you checking your blood sugars : before meals  Have you had any blood sugars above 200?: Yes  Have you had any blood sugars below 70?: Yes  Hypoglycemia symptoms:: blurred vision, other  Diabetic concerns:: blood sugar frequently over 200  Paraesthesia present:: burning in feet  General Questionnaire (Submitted on 8/23/2023)  Chief Complaint: Chronic problems general questions HPI Form  How many servings of fruits and vegetables do you eat daily?: 4 or more  On average, how many sweetened beverages do you drink each day (Examples: soda, juice, sweet tea, etc.  Do NOT count diet or artificially sweetened beverages)?: 1  How many minutes a day do you exercise enough to make your heart beat faster?: 9 or less  How many days a week do you exercise enough to make your heart beat faster?: 3 or less  How many days per week do you miss taking your medication?: 0

## 2023-08-30 DIAGNOSIS — E78.5 HYPERLIPIDEMIA: ICD-10-CM

## 2023-08-30 RX ORDER — ATORVASTATIN CALCIUM 10 MG/1
TABLET, FILM COATED ORAL
Qty: 90 TABLET | Refills: 0 | Status: SHIPPED | OUTPATIENT
Start: 2023-08-30 | End: 2023-12-05

## 2023-09-12 DIAGNOSIS — Z79.4 TYPE 2 DIABETES MELLITUS WITH DIABETIC POLYNEUROPATHY, WITH LONG-TERM CURRENT USE OF INSULIN (H): ICD-10-CM

## 2023-09-12 DIAGNOSIS — E11.42 TYPE 2 DIABETES MELLITUS WITH DIABETIC POLYNEUROPATHY, WITH LONG-TERM CURRENT USE OF INSULIN (H): ICD-10-CM

## 2023-09-12 RX ORDER — CANAGLIFLOZIN 100 MG/1
TABLET, FILM COATED ORAL
Qty: 90 TABLET | Refills: 1 | Status: SHIPPED | OUTPATIENT
Start: 2023-09-12 | End: 2024-03-27

## 2023-09-15 DIAGNOSIS — Z79.4 TYPE 2 DIABETES MELLITUS WITH DIABETIC POLYNEUROPATHY, WITH LONG-TERM CURRENT USE OF INSULIN (H): ICD-10-CM

## 2023-09-15 DIAGNOSIS — E11.42 TYPE 2 DIABETES MELLITUS WITH DIABETIC POLYNEUROPATHY, WITH LONG-TERM CURRENT USE OF INSULIN (H): ICD-10-CM

## 2023-09-15 RX ORDER — FLASH GLUCOSE SENSOR
KIT MISCELLANEOUS
Qty: 7 EACH | Refills: 4 | Status: SHIPPED | OUTPATIENT
Start: 2023-09-15 | End: 2023-10-12

## 2023-09-19 ENCOUNTER — TELEPHONE (OUTPATIENT)
Dept: FAMILY MEDICINE | Facility: CLINIC | Age: 68
End: 2023-09-19

## 2023-10-10 ENCOUNTER — PATIENT OUTREACH (OUTPATIENT)
Dept: CARE COORDINATION | Facility: CLINIC | Age: 68
End: 2023-10-10
Payer: MEDICARE

## 2023-10-12 DIAGNOSIS — Z79.4 TYPE 2 DIABETES MELLITUS WITH DIABETIC POLYNEUROPATHY, WITH LONG-TERM CURRENT USE OF INSULIN (H): ICD-10-CM

## 2023-10-12 DIAGNOSIS — E11.42 TYPE 2 DIABETES MELLITUS WITH DIABETIC POLYNEUROPATHY, WITH LONG-TERM CURRENT USE OF INSULIN (H): ICD-10-CM

## 2023-10-12 RX ORDER — FLASH GLUCOSE SENSOR
KIT MISCELLANEOUS
Qty: 7 EACH | Refills: 4 | Status: SHIPPED | OUTPATIENT
Start: 2023-10-12 | End: 2024-07-30 | Stop reason: ALTCHOICE

## 2023-10-12 NOTE — TELEPHONE ENCOUNTER
[place] [facility] called and the rx for Freestyle vadim needs to be from primary in order for it to be compliant for medicare to cover.  New rx pended for pcp.       CHIRAG GoncalvesN RN  [facility]

## 2023-10-18 DIAGNOSIS — Z79.4 TYPE 2 DIABETES MELLITUS WITHOUT COMPLICATION, WITH LONG-TERM CURRENT USE OF INSULIN (H): ICD-10-CM

## 2023-10-18 DIAGNOSIS — E11.9 TYPE 2 DIABETES MELLITUS WITHOUT COMPLICATION, WITH LONG-TERM CURRENT USE OF INSULIN (H): ICD-10-CM

## 2023-10-24 ENCOUNTER — PATIENT OUTREACH (OUTPATIENT)
Dept: CARE COORDINATION | Facility: CLINIC | Age: 68
End: 2023-10-24
Payer: MEDICARE

## 2023-12-18 DIAGNOSIS — E11.42 TYPE 2 DIABETES MELLITUS WITH DIABETIC POLYNEUROPATHY, WITH LONG-TERM CURRENT USE OF INSULIN (H): ICD-10-CM

## 2023-12-18 DIAGNOSIS — Z79.4 TYPE 2 DIABETES MELLITUS WITH DIABETIC POLYNEUROPATHY, WITH LONG-TERM CURRENT USE OF INSULIN (H): ICD-10-CM

## 2023-12-19 RX ORDER — PEN NEEDLE, DIABETIC 32GX 5/32"
NEEDLE, DISPOSABLE MISCELLANEOUS
Qty: 100 EACH | Refills: 1 | Status: SHIPPED | OUTPATIENT
Start: 2023-12-19 | End: 2024-07-02

## 2023-12-30 ENCOUNTER — HEALTH MAINTENANCE LETTER (OUTPATIENT)
Age: 68
End: 2023-12-30

## 2024-01-23 DIAGNOSIS — E78.5 HYPERLIPIDEMIA: ICD-10-CM

## 2024-01-23 RX ORDER — ATORVASTATIN CALCIUM 10 MG/1
TABLET, FILM COATED ORAL
Qty: 90 TABLET | Refills: 4 | Status: SHIPPED | OUTPATIENT
Start: 2024-01-23 | End: 2024-04-24

## 2024-02-23 DIAGNOSIS — E11.9 TYPE 2 DIABETES MELLITUS WITHOUT COMPLICATION, WITH LONG-TERM CURRENT USE OF INSULIN (H): ICD-10-CM

## 2024-02-23 DIAGNOSIS — Z79.4 TYPE 2 DIABETES MELLITUS WITHOUT COMPLICATION, WITH LONG-TERM CURRENT USE OF INSULIN (H): ICD-10-CM

## 2024-03-04 ENCOUNTER — TELEPHONE (OUTPATIENT)
Dept: FAMILY MEDICINE | Facility: CLINIC | Age: 69
End: 2024-03-04

## 2024-03-04 DIAGNOSIS — E87.6 HYPOKALEMIA: ICD-10-CM

## 2024-03-04 RX ORDER — POTASSIUM CHLORIDE 1500 MG/1
20 TABLET, EXTENDED RELEASE ORAL 2 TIMES DAILY
Qty: 180 TABLET | Refills: 0 | Status: SHIPPED | OUTPATIENT
Start: 2024-03-04 | End: 2024-04-24

## 2024-03-04 NOTE — TELEPHONE ENCOUNTER
Provider's message sent via [employer] message to pt as she is at a .     Israel Salazar, CHIRAGN, RN  [facility]

## 2024-03-04 NOTE — TELEPHONE ENCOUNTER
Patient really needs to schedule with endocrinology.  I would start by getting on their schedule.  She will get into see diabetes education much sooner, and diabetes education can help to get her into endocrinology sooner if indicated.  Diabetes education can typically assist in adjusting and refining her treatment while waiting for the endocrinology appointment.  JEAN MARIE

## 2024-03-05 DIAGNOSIS — Z79.4 TYPE 2 DIABETES MELLITUS WITH DIABETIC POLYNEUROPATHY, WITH LONG-TERM CURRENT USE OF INSULIN (H): ICD-10-CM

## 2024-03-05 DIAGNOSIS — E11.42 TYPE 2 DIABETES MELLITUS WITH DIABETIC POLYNEUROPATHY, WITH LONG-TERM CURRENT USE OF INSULIN (H): ICD-10-CM

## 2024-03-05 RX ORDER — INSULIN DEGLUDEC 100 U/ML
INJECTION, SOLUTION SUBCUTANEOUS
Qty: 6 ML | Refills: 2 | Status: SHIPPED | OUTPATIENT
Start: 2024-03-05 | End: 2024-08-06

## 2024-03-05 NOTE — TELEPHONE ENCOUNTER
Writer called patient and left message to return call to clinic.     If patient returns call please relay below recommendations and notes per PCP.    Please route to nurse queue if patient has any further questions or concerns.    BRITTNI Meléndez, RN  [facility]

## 2024-03-07 NOTE — TELEPHONE ENCOUNTER
Left message for pt that My[facility] sent was not read and if pt could read it or give the clinic a call at [phone], someone could relay provider's message to her.     Please relay provider's message to her when she returns call.    Israel Salazar, CHIRAGN, RN  [facility]

## 2024-03-09 ENCOUNTER — HEALTH MAINTENANCE LETTER (OUTPATIENT)
Age: 69
End: 2024-03-09

## 2024-03-11 NOTE — TELEPHONE ENCOUNTER
Writer called patient and left message to return call to clinic.      If patient returns call please relay below recommendations and notes per PCP.     Please route to nurse queue if patient has any further questions or concerns.     BRITTNI Meléndez, RN  [facility]

## 2024-03-12 NOTE — TELEPHONE ENCOUNTER
Please wait 1-2 weeks, then attempt to contact again.  If still unsuccessful, will discuss at visit in April.  JEAN MARIE

## 2024-03-21 NOTE — TELEPHONE ENCOUNTER
"Writer called patient and left message to return call to clinic.      If patient returns call please relay below recommendations and notes per PCP.     Please route to nurse queue if patient has any further questions or concerns.     BRITTNI Meléndez, RN  [facility]    Per PCP,  \"Patient really needs to schedule with endocrinology.  I would start by getting on their schedule.  She will get into see diabetes education much sooner, and diabetes education can help to get her into endocrinology sooner if indicated.  Diabetes education can typically assist in adjusting and refining her treatment while waiting for the endocrinology appointment.  JEAN MARIE\"    "

## 2024-03-27 DIAGNOSIS — Z79.4 TYPE 2 DIABETES MELLITUS WITH DIABETIC POLYNEUROPATHY, WITH LONG-TERM CURRENT USE OF INSULIN (H): ICD-10-CM

## 2024-03-27 DIAGNOSIS — E11.42 TYPE 2 DIABETES MELLITUS WITH DIABETIC POLYNEUROPATHY, WITH LONG-TERM CURRENT USE OF INSULIN (H): ICD-10-CM

## 2024-03-27 RX ORDER — CANAGLIFLOZIN 100 MG/1
TABLET, FILM COATED ORAL
Qty: 90 TABLET | Refills: 0 | Status: SHIPPED | OUTPATIENT
Start: 2024-03-27 | End: 2024-07-24

## 2024-04-18 ENCOUNTER — TRANSFERRED RECORDS (OUTPATIENT)
Dept: MULTI SPECIALTY CLINIC | Facility: CLINIC | Age: 69
End: 2024-04-18
Payer: MEDICARE

## 2024-04-18 LAB — RETINOPATHY: POSITIVE

## 2024-04-22 SDOH — HEALTH STABILITY: PHYSICAL HEALTH: ON AVERAGE, HOW MANY MINUTES DO YOU ENGAGE IN EXERCISE AT THIS LEVEL?: 30 MIN

## 2024-04-22 SDOH — HEALTH STABILITY: PHYSICAL HEALTH: ON AVERAGE, HOW MANY DAYS PER WEEK DO YOU ENGAGE IN MODERATE TO STRENUOUS EXERCISE (LIKE A BRISK WALK)?: 2 DAYS

## 2024-04-22 ASSESSMENT — SOCIAL DETERMINANTS OF HEALTH (SDOH): HOW OFTEN DO YOU GET TOGETHER WITH FRIENDS OR RELATIVES?: PATIENT DECLINED

## 2024-04-24 ENCOUNTER — OFFICE VISIT (OUTPATIENT)
Dept: FAMILY MEDICINE | Facility: CLINIC | Age: 69
End: 2024-04-24
Payer: MEDICARE

## 2024-04-24 VITALS
OXYGEN SATURATION: 96 % | DIASTOLIC BLOOD PRESSURE: 70 MMHG | HEART RATE: 88 BPM | SYSTOLIC BLOOD PRESSURE: 122 MMHG | TEMPERATURE: 98 F | WEIGHT: 131 LBS | BODY MASS INDEX: 23.21 KG/M2 | HEIGHT: 63 IN | RESPIRATION RATE: 18 BRPM

## 2024-04-24 DIAGNOSIS — E87.6 HYPOKALEMIA: ICD-10-CM

## 2024-04-24 DIAGNOSIS — E78.00 PURE HYPERCHOLESTEROLEMIA: ICD-10-CM

## 2024-04-24 DIAGNOSIS — I27.20 PULMONARY HYPERTENSION, MILD (H): ICD-10-CM

## 2024-04-24 DIAGNOSIS — E11.42 TYPE 2 DIABETES MELLITUS WITH DIABETIC POLYNEUROPATHY, WITH LONG-TERM CURRENT USE OF INSULIN (H): ICD-10-CM

## 2024-04-24 DIAGNOSIS — M85.80 OSTEOPENIA, UNSPECIFIED LOCATION: ICD-10-CM

## 2024-04-24 DIAGNOSIS — Z12.31 ENCOUNTER FOR SCREENING MAMMOGRAM FOR MALIGNANT NEOPLASM OF BREAST: ICD-10-CM

## 2024-04-24 DIAGNOSIS — D68.59 PRIMARY HYPERCOAGULABLE STATE (H): ICD-10-CM

## 2024-04-24 DIAGNOSIS — E11.42 DIABETIC POLYNEUROPATHY ASSOCIATED WITH TYPE 2 DIABETES MELLITUS (H): ICD-10-CM

## 2024-04-24 DIAGNOSIS — I36.1 TRICUSPID VALVE REGURGITATION, NONRHEUMATIC: ICD-10-CM

## 2024-04-24 DIAGNOSIS — Z00.00 MEDICARE ANNUAL WELLNESS VISIT, SUBSEQUENT: Primary | ICD-10-CM

## 2024-04-24 DIAGNOSIS — Z79.4 TYPE 2 DIABETES MELLITUS WITH DIABETIC POLYNEUROPATHY, WITH LONG-TERM CURRENT USE OF INSULIN (H): ICD-10-CM

## 2024-04-24 DIAGNOSIS — R26.89 POOR BALANCE: ICD-10-CM

## 2024-04-24 LAB
ALBUMIN SERPL BCG-MCNC: 4.6 G/DL (ref 3.5–5.2)
ALP SERPL-CCNC: 98 U/L (ref 40–150)
ALT SERPL W P-5'-P-CCNC: 18 U/L (ref 0–50)
ANION GAP SERPL CALCULATED.3IONS-SCNC: 18 MMOL/L (ref 7–15)
AST SERPL W P-5'-P-CCNC: 16 U/L (ref 0–45)
BILIRUB SERPL-MCNC: 0.3 MG/DL
BUN SERPL-MCNC: 14.4 MG/DL (ref 8–23)
CALCIUM SERPL-MCNC: 10 MG/DL (ref 8.8–10.2)
CHLORIDE SERPL-SCNC: 93 MMOL/L (ref 98–107)
CHOLEST SERPL-MCNC: 173 MG/DL
CREAT SERPL-MCNC: 0.56 MG/DL (ref 0.51–0.95)
DEPRECATED HCO3 PLAS-SCNC: 25 MMOL/L (ref 22–29)
EGFRCR SERPLBLD CKD-EPI 2021: >90 ML/MIN/1.73M2
GLUCOSE SERPL-MCNC: 228 MG/DL (ref 70–99)
HBA1C MFR BLD: >15 % (ref 0–5.6)
HDLC SERPL-MCNC: 68 MG/DL
HOLD SPECIMEN: NORMAL
HOLD SPECIMEN: NORMAL
LDLC SERPL CALC-MCNC: 82 MG/DL
NONHDLC SERPL-MCNC: 105 MG/DL
POTASSIUM SERPL-SCNC: 4.2 MMOL/L (ref 3.4–5.3)
PROT SERPL-MCNC: 6.6 G/DL (ref 6.4–8.3)
SODIUM SERPL-SCNC: 136 MMOL/L (ref 135–145)
TRIGL SERPL-MCNC: 115 MG/DL
VIT D+METAB SERPL-MCNC: 70 NG/ML (ref 20–50)

## 2024-04-24 PROCEDURE — 99214 OFFICE O/P EST MOD 30 MIN: CPT | Mod: 25 | Performed by: FAMILY MEDICINE

## 2024-04-24 PROCEDURE — 90480 ADMN SARSCOV2 VAC 1/ONLY CMP: CPT | Performed by: FAMILY MEDICINE

## 2024-04-24 PROCEDURE — 80053 COMPREHEN METABOLIC PANEL: CPT | Performed by: FAMILY MEDICINE

## 2024-04-24 PROCEDURE — G0439 PPPS, SUBSEQ VISIT: HCPCS | Performed by: FAMILY MEDICINE

## 2024-04-24 PROCEDURE — 82570 ASSAY OF URINE CREATININE: CPT | Performed by: FAMILY MEDICINE

## 2024-04-24 PROCEDURE — 82043 UR ALBUMIN QUANTITATIVE: CPT | Performed by: FAMILY MEDICINE

## 2024-04-24 PROCEDURE — 80061 LIPID PANEL: CPT | Performed by: FAMILY MEDICINE

## 2024-04-24 PROCEDURE — 82306 VITAMIN D 25 HYDROXY: CPT | Performed by: FAMILY MEDICINE

## 2024-04-24 PROCEDURE — 83036 HEMOGLOBIN GLYCOSYLATED A1C: CPT | Performed by: FAMILY MEDICINE

## 2024-04-24 PROCEDURE — 36415 COLL VENOUS BLD VENIPUNCTURE: CPT | Performed by: FAMILY MEDICINE

## 2024-04-24 PROCEDURE — 91320 SARSCV2 VAC 30MCG TRS-SUC IM: CPT | Performed by: FAMILY MEDICINE

## 2024-04-24 RX ORDER — ATORVASTATIN CALCIUM 10 MG/1
10 TABLET, FILM COATED ORAL DAILY
Qty: 90 TABLET | Refills: 4 | Status: SHIPPED | OUTPATIENT
Start: 2024-04-24

## 2024-04-24 RX ORDER — POTASSIUM CHLORIDE 1500 MG/1
20 TABLET, EXTENDED RELEASE ORAL 2 TIMES DAILY
Qty: 180 TABLET | Refills: 4 | Status: SHIPPED | OUTPATIENT
Start: 2024-04-24

## 2024-04-24 ASSESSMENT — PAIN SCALES - GENERAL: PAINLEVEL: NO PAIN (0)

## 2024-04-24 NOTE — PROGRESS NOTES
Preventive Care Visit  [facility]  Shante Cohen MD, Family Medicine  Apr 24, 2024      Assessment & Plan     Medicare annual wellness visit, subsequent  At today's visit, we discussed lifestyle interventions to promote self-management and wellness, including maintenance of a healthy weight, healthy diet, regular physical activity and exercise, and falls prevention. COVID booster administered today.  Order placed for screening mammogram.  Discussed colonoscopy, will plan to pursue but will hold off until her sugars are under better control so that anesthesia safety and prep safety are improved.    Type 2 diabetes mellitus with diabetic polyneuropathy, with long-term current use of insulin (H)  Significantly poor control.  Reiterated importance of scheduling with endocrinology for further evaluation and assistance in treatment.  Advised that she follow-up with diabetes education to assist in managing sugars as well.  They can help to troubleshoot some struggles with the vadim sensors potentially though patient remains in contact with the manufacture as well.  We discussed importance of medication compliance and techniques to help improve medication compliance.  Encouraged more regular exercise.  Will check kidney function, electrolytes, liver function today.  Will check urine microalbumin today.  - Albumin Random Urine Quantitative with Creat Ratio; Future  - Extra Tube  - Albumin Random Urine Quantitative with Creat Ratio  - metFORMIN (GLUCOPHAGE) 500 MG tablet; TAKE 2 TABLETS BY MOUTH TWICE DAILY WITH FOOD  - Comprehensive metabolic panel; Future  - Adult Endocrinology  Referral; Future  - Adult Diabetes Education  Referral; Future  - Comprehensive metabolic panel    Diabetic polyneuropathy associated with type 2 diabetes mellitus (H)  Chronic, discomfort manageable but resulting in difficulties with balance.  Referral made to physical therapy.  Discussed importance of  diabetes control in managing neuropathy and reducing risk of progression.  - Physical Therapy  Referral; Future    Hypercholesterolemia  Encouraged healthy lifestyle habits.  Continue atorvastatin.  We will check lipids and liver function today.  - atorvastatin (LIPITOR) 10 MG tablet; Take 1 tablet (10 mg) by mouth daily  - Comprehensive metabolic panel; Future  - Lipid panel reflex to direct LDL Fasting; Future  - Comprehensive metabolic panel  - Lipid panel reflex to direct LDL Fasting    Pulmonary hypertension, mild (H)  This is well-controlled currently.    Tricuspid valve regurgitation, nonrheumatic  Asymptomatic.  Resolved on most recent echo in 2020.    Osteopenia, unspecified location  Encouraged weightbearing activities, measures to reduce risk of falls, adequate calcium and vitamin D intake.  Will check vitamin D level.  Risk of fracture remains low.  - Vitamin D Deficiency; Future  - Vitamin D Deficiency    Factor V Leiden Mutation  No evidence of DVT or complications.    Hypokalemia  Will check potassium level today.  Refill provided of potassium chloride.  - potassium chloride nichole ER (KLOR-CON M20) 20 MEQ CR tablet; Take 1 tablet (20 mEq) by mouth 2 times daily    Poor balance  Referral made to physical therapy as above.    Encounter for screening mammogram for malignant neoplasm of breast  - *MA Screening Digital Bilateral; Future    Counseling  Appropriate preventive services were discussed with this patient, including applicable screening as appropriate for fall prevention, nutrition, physical activity, Tobacco-use cessation, weight loss and cognition.  Checklist reviewing preventive services available has been given to the patient.  Reviewed patient's diet, addressing concerns and/or questions.   She is at risk for lack of exercise and has been provided with information to increase physical activity for the benefit of her well-being.           Letitia Florentino is a 68 year old,  presenting for the following:  Wellness Visit (fasting) and Diabetes          Via the Health Maintenance questionnaire, the patient has reported the following services have been completed -Eye Exam, this information has been sent to the abstraction team.  Health Care Directive  Patient does not have a Health Care Directive or Living Will: Discussed advance care planning with patient; information given to patient to review.    Seen in clinic today for annual wellness visit and also for follow-up of type 2 diabetes.  She is agreeable to mammogram.  She is not yet ready to schedule her colonoscopy.  She is up-to-date with bone density screening.  She is agreeable to COVID booster today.  She is fasting.  She has been monitoring her blood sugars less regularly with her vadim meter as she is having some difficulty with the sensors staying on.  Denies recent low sugars.  Has not scheduled with diabetes education or endocrinology as recommended previously.  Taking Tresiba 60 units at bedtime, metformin, and Invokana.  Admits that she is sometimes forgetting to take her medications.  She also admits that she has been less strict with her diet, exercising about 1 time per week walking.  Struggling with some constant tingling in her feet related to neuropathy, this is impacting her balance and she finds she is needing to take her time more.  No lower extremity edema or shortness of breath, no recurrence of pulmonary hypertension symptoms.  Due for follow-up of high cholesterol, remains on atorvastatin for this.          4/22/2024   General Health   How would you rate your overall physical health? Good   Feel stress (tense, anxious, or unable to sleep) Not at all         4/22/2024   Nutrition   Diet: Diabetic    Carbohydrate counting         4/22/2024   Exercise   Days per week of moderate/strenous exercise 2 days   Average minutes spent exercising at this level 30 min   (!) EXERCISE CONCERN      4/22/2024   Social Factors    Frequency of gathering with friends or relatives Patient declined   Worry food won't last until get money to buy more Patient declined   Food not last or not have enough money for food? Patient declined   Do you have housing?  Patient declined   Are you worried about losing your housing? Patient declined   Lack of transportation? Patient declined   Unable to get utilities (heat,electricity)? Patient declined         4/24/2024   Fall Risk   Fallen 2 or more times in the past year? No   Trouble with walking or balance? No   Gait Speed Test (Document in seconds) 4.4   Gait Speed Test Interpretation Less than or equal to 5.00 seconds - PASS          4/22/2024   Activities of Daily Living- Home Safety   Needs help with the following daily activites None of the above   Safety concerns in the home None of the above         4/22/2024   Dental   Dentist two times every year? Yes         4/22/2024   Hearing Screening   Hearing concerns? None of the above         4/22/2024   Driving Risk Screening   Patient/family members have concerns about driving No         4/22/2024   General Alertness/Fatigue Screening   Have you been more tired than usual lately? No         4/22/2024   Urinary Incontinence Screening   Bothered by leaking urine in past 6 months No         4/22/2024   TB Screening   Were you born outside of the [place]? No         Today's PHQ-2 Score:       4/24/2024     9:19 AM   PHQ-2 ( 1999 Pf[employer])   Q1: Little interest or pleasure in doing things 0   Q2: Feeling down, depressed or hopeless 0   PHQ-2 Score 0   Q1: Little interest or pleasure in doing things Not at all   Q2: Feeling down, depressed or hopeless Not at all   PHQ-2 Score 0           4/22/2024   Substance Use   Alcohol more than 3/day or more than 7/wk No   Do you have a current opioid prescription? No   How severe/bad is pain from 1 to 10? 0/10 (No Pain)   Do you use any other substances recreationally? No     Social History     Tobacco Use    Smoking status:  Never    Smokeless tobacco: Never   Vaping Use    Vaping status: Never Used   Substance Use Topics    Alcohol use: No    Drug use: No           2022   LAST FHS-7 RESULTS   1st degree relative breast or ovarian cancer No   Any relative bilateral breast cancer No   Any male have breast cancer No   Any ONE woman have BOTH breast AND ovarian cancer No   Any woman with breast cancer before 50yrs No   2 or more relatives with breast AND/OR ovarian cancer No   2 or more relatives with breast AND/OR bowel cancer No        Mammogram Screening - Mammogram every 1-2 years updated in Health Maintenance based on mutual decision making    ASCVD Risk   The 10-year ASCVD risk score (Stanislaw WARD, et al., 2019) is: 10.9%    Values used to calculate the score:      Age: 68 years      Sex: Female      Is Non- : No      Diabetic: Yes      Tobacco smoker: No      Systolic Blood Pressure: 122 mmHg      Is BP treated: No      HDL Cholesterol: 80 mg/dL      Total Cholesterol: 159 mg/dL            Reviewed and updated as needed this visit by Provider                    Past Medical History:   Diagnosis Date    High cholesterol     Primary hypercoagulable state (H24)     Created by [facility] Annotation: May 19 2008 10:15Ciara Carrillo:  heterozygous;     Pulmonary hypertension (H)     mild    Tricuspid valve regurgitation, nonrheumatic 3/3/2017    Likely secondary     Type 2 diabetes mellitus (H)     Created by Conversion      No past surgical history on file.  OB History    Para Term  AB Living   1 1 1 0 0 0   SAB IAB Ectopic Multiple Live Births   0 0 0 0 0      # Outcome Date GA Lbr Imtiaz/2nd Weight Sex Type Anes PTL Lv   1 Term              Lab work is in process  Labs reviewed in EPIC  BP Readings from Last 3 Encounters:   24 122/70   23 112/64   23 102/60    Wt Readings from Last 3 Encounters:   24 59.4 kg (131 lb)   23 62.7 kg (138 lb  3.2 oz)   03/29/23 65.8 kg (145 lb 1.6 oz)                  Patient Active Problem List   Diagnosis    Hypercholesterolemia    Factor V Leiden Mutation    Type 2 diabetes mellitus with diabetic polyneuropathy, with long-term current use of insulin (H)    Pulmonary hypertension, mild (H)    Tricuspid valve regurgitation, nonrheumatic    Osteopenia, unspecified location    Hypokalemia     No past surgical history on file.    Social History     Tobacco Use    Smoking status: Never    Smokeless tobacco: Never   Substance Use Topics    Alcohol use: No     Family History   Problem Relation Age of Onset    Factor V Leiden deficiency Mother          Current Outpatient Medications   Medication Sig Dispense Refill    atorvastatin (LIPITOR) 10 MG tablet Take 1 tablet (10 mg) by mouth daily 90 tablet 4    insulin pen needle (BD PEN NEEDLE PRINCESS 2ND GEN) 32G X 4 MM miscellaneous USE 1  ONCE DAILY TO  INJECT  TRESIBA 100 each 1    metFORMIN (GLUCOPHAGE) 500 MG tablet TAKE 2 TABLETS BY MOUTH TWICE DAILY WITH FOOD 360 tablet 3    potassium chloride nichole ER (KLOR-CON M20) 20 MEQ CR tablet Take 1 tablet (20 mEq) by mouth 2 times daily 180 tablet 4    aspirin 325 MG tablet [ASPIRIN 325 MG TABLET] Take 325 mg by mouth daily.      blood glucose (ONETOUCH VERIO IQ) test strip USE TO TEST BLOOD SUGAR 4 TIMES A DAY AS NEEDED 400 strip 11    Continuous Blood Gluc  (FREESTYLE ELLYN 14 DAY READER) LAZARO Use to read blood sugars as per 's instructions. 1 each 0    Continuous Blood Gluc Sensor (FREESTYLE ELLYN 14 DAY SENSOR) MISC Change every 14 days. Use per manufacture's instructions to check glucose daily 7 each 4    flash glucose scanning reader (FREESTYLE ELLYN 14 DAY READER) Misc [FLASH GLUCOSE SCANNING READER (FREESTYLE ELLYN 14 DAY READER) MISC] Use 1 Units As Directed daily. Use reader to scan glucose  sensor every 8 hours 1 each 0    generic lancets [GENERIC LANCETS] Accu-Chek Multiclix Lancets Miscellaneous  Test BG  4x daily 100 each 11    Glucose Blood (BLOOD GLUCOSE TEST STRIPS) STRP Use to test blood sugars once daily. Dispense brand per patient's insurance at pharmacy discretion. 100 strip 11    INVOKANA 100 MG tablet TAKE 1 TABLET BY MOUTH ONCE DAILY BEFORE BREAKFAST 90 tablet 0    lancets (ONETOUCH DELICA LANCETS) 33 gauge Misc [LANCETS (ONETOUCH DELICA LANCETS) 33 GAUGE MISC] 1 each by In Vitro route 4 (four) times a day. 100 each 3    multivitamin (MULTIPLE VITAMINS ORAL) [MULTIVITAMIN (MULTIPLE VITAMINS ORAL)] Take by mouth. Centrum silver      TRESIBA FLEXTOUCH 100 UNIT/ML pen INJECT 6 UNITS SUBCUTANEOUSLY ONCE DAILY 6 mL 2     No Known Allergies  Recent Labs   Lab Test 04/24/24  0855 04/24/24  0851 08/24/23  0810 03/29/23  0702 11/09/22  0741 07/07/21  0717 02/24/21  0730 11/18/20  0701 02/26/20  0843   A1C >15.0*  --  13.6* 12.5* 8.5*   < >  --    < >  --    LDL  --  82  --   --  68  --  61  --  81   HDL  --  68  --   --  80  --  65  --  66   TRIG  --  115  --   --  55  --  71  --  53   ALT  --  18 18  --  24  --  23  --  33   CR  --  0.56 0.57  --  0.56  --  0.66  --  0.66   GFRESTIMATED  --  >90 >90  --  >90   < > >60  --  >60   GFRESTBLACK  --   --   --   --   --   --  >60  --  >60   POTASSIUM  --  4.2 4.9  --  4.4   < > 4.2  --  4.9    < > = values in this interval not displayed.      Current providers sharing in care for this patient include:  Patient Care Team:  Shante Cohen MD as PCP - Elizabeth Caputo, PharmD as Pharmacist (Pharmacist)  Shante Cohen MD as Assigned PCP    The following health maintenance items are reviewed in Epic and correct as of today:  Health Maintenance   Topic Date Due    COLORECTAL CANCER SCREENING  06/11/2022    COVID-19 Vaccine (5 - 2023-24 season) 09/01/2023    MEDICARE ANNUAL WELLNESS VISIT  11/09/2023    LIPID  11/09/2023    EYE EXAM  11/17/2023    MICROALBUMIN  03/29/2024    MAMMO SCREENING  05/27/2024    BMP  08/24/2024    DIABETIC FOOT EXAM  08/24/2024     "ANNUAL REVIEW OF HM ORDERS  08/24/2024    A1C  10/24/2024    DEXA  04/11/2025    FALL RISK ASSESSMENT  04/24/2025    ADVANCE CARE PLANNING  11/09/2027    DTAP/TDAP/TD IMMUNIZATION (3 - Td or Tdap) 02/25/2029    HEPATITIS C SCREENING  Completed    PHQ-2 (once per calendar year)  Completed    INFLUENZA VACCINE  Completed    Pneumococcal Vaccine: 65+ Years  Completed    ZOSTER IMMUNIZATION  Completed    RSV VACCINE (Pregnancy & 60+)  Completed    IPV IMMUNIZATION  Aged Out    HPV IMMUNIZATION  Aged Out    MENINGITIS IMMUNIZATION  Aged Out    RSV MONOCLONAL ANTIBODY  Aged Out         Review of Systems  Constitutional, neuro, ENT, endocrine, pulmonary, cardiac, gastrointestinal, genitourinary, musculoskeletal, integument and psychiatric systems are negative, except as otherwise noted.     Objective    Exam  /70   Pulse 88   Temp 98  F (36.7  C) (Temporal)   Resp 18   Ht 1.588 m (5' 2.5\")   Wt 59.4 kg (131 lb)   LMP  (LMP Unknown)   SpO2 96%   BMI 23.58 kg/m     Estimated body mass index is 23.58 kg/m  as calculated from the following:    Height as of this encounter: 1.588 m (5' 2.5\").    Weight as of this encounter: 59.4 kg (131 lb).    Physical Exam  GENERAL: alert and no distress  EYES: Eyes grossly normal to inspection, PERRL and conjunctivae and sclerae normal  HENT: ear canals and TM's normal, nose and mouth without ulcers or lesions  NECK: no adenopathy, no asymmetry, masses, or scars  RESP: lungs clear to auscultation - no rales, rhonchi or wheezes  CV: regular rate and rhythm, normal S1 S2, no S3 or S4, no murmur, click or rub, no peripheral edema  ABDOMEN: soft, nontender, no hepatosplenomegaly, no masses and bowel sounds normal  MS: no gross musculoskeletal defects noted, no edema  SKIN: no suspicious lesions or rashes  NEURO: Normal strength and tone, mentation intact and speech normal  PSYCH: mentation appears normal, affect normal/bright        4/24/2024   Mini Cog   Clock Draw Score 2 Normal "   3 Item Recall 3 objects recalled   Mini Cog Total Score 5              Signed Electronically by: Shante Cohen MD

## 2024-04-24 NOTE — PATIENT INSTRUCTIONS
Preventive Care Advice   This is general advice given by our system to help you stay healthy. However, your care team may have specific advice just for you. Please talk to your care team about your preventive care needs.  Nutrition  Eat 5 or more servings of fruits and vegetables each day.  Try wheat bread, brown rice and whole grain pasta (instead of white bread, rice, and pasta).  Get enough calcium and vitamin D. Check the label on foods and aim for 100% of the RDA (recommended daily allowance).  Lifestyle  Exercise at least 150 minutes each week   (30 minutes a day, 5 days a week).  Do muscle strengthening activities 2 days a week. These help control your weight and prevent disease.  No smoking.  Wear sunscreen to prevent skin cancer.  Have a dental exam and cleaning every 6 months.  Yearly exams  See your health care team every year to talk about:  Any changes in your health.  Any medicines your care team has prescribed.  Preventive care, family planning, and ways to prevent chronic diseases.  Shots (vaccines)   HPV shots (up to age 26), if you've never had them before.  Hepatitis B shots (up to age 59), if you've never had them before.  COVID-19 shot: Get this shot when it's due.  Flu shot: Get a flu shot every year.  Tetanus shot: Get a tetanus shot every 10 years.  Pneumococcal, hepatitis A, and RSV shots: Ask your care team if you need these based on your risk.  Shingles shot (for age 50 and up).  General health tests  Diabetes screening:  Starting at age 35, Get screened for diabetes at least every 3 years.  If you are younger than age 35, ask your care team if you should be screened for diabetes.  Cholesterol test: At age 39, start having a cholesterol test every 5 years, or more often if advised.  Bone density scan (DEXA): At age 50, ask your care team if you should have this scan for osteoporosis (brittle bones).  Hepatitis C: Get tested at least once in your life.  STIs (sexually transmitted  infections)  Before age 24: Ask your care team if you should be screened for STIs.  After age 24: Get screened for STIs if you're at risk. You are at risk for STIs (including HIV) if:  You are sexually active with more than one person.  You don't use condoms every time.  You or a partner was diagnosed with a sexually transmitted infection.  If you are at risk for HIV, ask about PrEP medicine to prevent HIV.  Get tested for HIV at least once in your life, whether you are at risk for HIV or not.  Cancer screening tests  Cervical cancer screening: If you have a cervix, begin getting regular cervical cancer screening tests at age 21. Most people who have regular screenings with normal results can stop after age 65. Talk about this with your provider.  Breast cancer scan (mammogram): If you've ever had breasts, begin having regular mammograms starting at age 40. This is a scan to check for breast cancer.  Colon cancer screening: It is important to start screening for colon cancer at age 45.  Have a colonoscopy test every 10 years (or more often if you're at risk) Or, ask your provider about stool tests like a FIT test every year or Cologuard test every 3 years.  To learn more about your testing options, visit: https://www.[email or web address]/593518.pdf.  For help making a decision, visit: https://bit.ly/jz56012.  Prostate cancer screening test: If you have a prostate and are age 55 to 69, ask your provider if you would benefit from a yearly prostate cancer screening test.  Lung cancer screening: If you are a current or former smoker age 50 to 80, ask your care team if ongoing lung cancer screenings are right for you.  For informational purposes only. Not to replace the advice of your health care provider. Copyright   2023 [employer]. All rights reserved. Clinically reviewed by the [facility] Transitions Program. [employer] 122526 - REV 01/24.    Preventing Falls: Care Instructions  Injuries and health  problems such as trouble walking or poor eyesight can increase your risk of falling. So can some medicines. But there are things you can do to help prevent falls. You can exercise to get stronger. You can also arrange your home to make it safer.    Talk to your doctor about the medicines you take. Ask if any of them increase the risk of falls and whether they can be changed or stopped.   Try to exercise regularly. It can help improve your strength and balance. This can help lower your risk of falling.     Practice fall safety and prevention.    Wear low-heeled shoes that fit well and give your feet good support. Talk to your doctor if you have foot problems that make this hard.  Carry a cellphone or wear a medical alert device that you can use to call for help.  Use stepladders instead of chairs to reach high objects. Don't climb if you're at risk for falls. Ask for help, if needed.  Wear the correct eyeglasses, if you need them.    Make your home safer.    Remove rugs, cords, clutter, and furniture from walkways.  Keep your house well lit. Use night-lights in hallways and bathrooms.  Install and use sturdy handrails on stairways.  Wear nonskid footwear, even inside. Don't walk barefoot or in socks without shoes.    Be safe outside.    Use handrails, curb cuts, and ramps whenever possible.  Keep your hands free by using a shoulder bag or backpack.  Try to walk in well-lit areas. Watch out for uneven ground, changes in pavement, and debris.  Be careful in the winter. Walk on the grass or gravel when sidewalks are slippery. Use de-icer on steps and walkways. Add non-slip devices to shoes.    Put grab bars and nonskid mats in your shower or tub and near the toilet. Try to use a shower chair or bath bench when bathing.   Get into a tub or shower by putting in your weaker leg first. Get out with your strong side first. Have a phone or medical alert device in the bathroom with you.   Where can you learn more?  Go to  "https://www.[employer].net/patiented  Enter G117 in the search box to learn more about \"Preventing Falls: Care Instructions.\"  Current as of: July 17, 2023               Content Version: 14.0    9890-7355 [employer].   Care instructions adapted under license by your healthcare professional. If you have questions about a medical condition or this instruction, always ask your healthcare professional. [employer] disclaims any warranty or liability for your use of this information.      "

## 2024-04-25 LAB
CREAT UR-MCNC: 41.5 MG/DL
MICROALBUMIN UR-MCNC: <12 MG/L
MICROALBUMIN/CREAT UR: NORMAL MG/G{CREAT}

## 2024-04-29 ENCOUNTER — PATIENT OUTREACH (OUTPATIENT)
Dept: CARE COORDINATION | Facility: CLINIC | Age: 69
End: 2024-04-29
Payer: MEDICARE

## 2024-05-07 ENCOUNTER — ANCILLARY PROCEDURE (OUTPATIENT)
Dept: MAMMOGRAPHY | Facility: CLINIC | Age: 69
End: 2024-05-07
Attending: FAMILY MEDICINE
Payer: MEDICARE

## 2024-05-07 DIAGNOSIS — Z12.31 ENCOUNTER FOR SCREENING MAMMOGRAM FOR MALIGNANT NEOPLASM OF BREAST: ICD-10-CM

## 2024-05-07 PROCEDURE — 77067 SCR MAMMO BI INCL CAD: CPT

## 2024-05-14 ENCOUNTER — THERAPY VISIT (OUTPATIENT)
Dept: PHYSICAL THERAPY | Facility: REHABILITATION | Age: 69
End: 2024-05-14
Attending: FAMILY MEDICINE
Payer: MEDICARE

## 2024-05-14 DIAGNOSIS — E11.42 DIABETIC POLYNEUROPATHY ASSOCIATED WITH TYPE 2 DIABETES MELLITUS (H): ICD-10-CM

## 2024-05-14 PROCEDURE — 97110 THERAPEUTIC EXERCISES: CPT | Mod: GP | Performed by: PHYSICAL THERAPIST

## 2024-05-14 PROCEDURE — 97112 NEUROMUSCULAR REEDUCATION: CPT | Mod: GP | Performed by: PHYSICAL THERAPIST

## 2024-05-14 PROCEDURE — 97162 PT EVAL MOD COMPLEX 30 MIN: CPT | Mod: GP | Performed by: PHYSICAL THERAPIST

## 2024-05-14 NOTE — PROGRESS NOTES
"PHYSICAL THERAPY EVALUATION  Type of Visit: Evaluation    See electronic medical record for Abuse and Falls Screening details.    Subjective   She has decreased balance due to diabetic polyneuropathy and would like to use physical therapy to improve this.      Presenting condition or subjective complaint: balance  Date of onset: 04/24/24    Relevant medical history: Diabetes   Dates & types of surgery:      Prior diagnostic imaging/testing results:       Prior therapy history for the same diagnosis, illness or injury: No        Living Environment  Social support: With family members   Type of home: House   Stairs to enter the home: Yes 1 Is there a railing: No   Ramp: No   Stairs inside the home: Yes 6 Is there a railing: Yes   Help at home: None  Equipment owned:       Employment: No    Hobbies/Interests:      Patient goals for therapy: just better balance       Objective   STRENGTH:   Pain: - none + mild ++ moderate +++ severe  Strength Scale: 0-5/5 Left Right   Hip Flexion 4 4   Hip Extension 4+ 4+   Hip Abduction 4- 4-   Hip Adduction 5 5   Hip Internal Rotation 5 5   Hip External Rotation 5 5   Knee Flexion 4+ 4+   Knee Extension 5 5       GAIT:   Gait Deviations: Stride length decreased  Izabella decreased    BALANCE:     SPECIAL TESTS  Functional Gait Assessment (FGA) TOTAL SCORE: (MAXIMUM SCORE 30): 15    5 Times Sit-to-Stand (5TSTS)  21\"     Timed Up and Go (TUG) - sec 15.89\", 13.85\"   Romberg  (sec) EO and EC: 30+ seconds   Sharpened Romberg (sec) EO: R back 3 seconds, L back <1 second   30 Second Sit to Stand (reps/height) 7         Assessment & Plan   CLINICAL IMPRESSIONS  Medical Diagnosis: Diabetic polyneuropathy associated with type 2 diabetes mellitus (H)    Treatment Diagnosis: Impaired balance   Impression/Assessment: Patient is a 68 year old female with impaired balance complaints. The following significant findings have been identified: Decreased strength, Impaired balance, Impaired gait, and " "Impaired muscle performance. These impairments interfere with their ability to perform recreational activities and community mobility as compared to previous level of function.     Clinical Decision Making (Complexity):  Clinical Presentation: Evolving/Changing  Clinical Presentation Rationale: based on medical and personal factors listed in PT evaluation  Clinical Decision Making (Complexity): Moderate complexity    PLAN OF CARE  Treatment Interventions:  Interventions: Gait Training, Manual Therapy, Neuromuscular Re-education, Therapeutic Activity, Therapeutic Exercise, Self-Care/Home Management    Long Term Goals     PT Goal 1  Goal Identifier: FGA  Goal Description: Mishel will demonstrate a decrease in pain and increase in function as measured by scoring >/= 23/30 on the FGA.  Goal Progress: 15/30  Target Date: 08/12/24  PT Goal 2  Goal Identifier: 30\" sit to stand  Goal Description: Mishel will demonstrate a decrease in pain and increase in function as measured by scoring >/= 12 reps without hands on the 30\" sit to stand test.  Goal Progress: 7 without hands  Target Date: 08/12/24  PT Goal 3  Goal Identifier: Sharpened Romberg  Goal Description: Mishel will demonstrate a decrease in pain and increase in function as measured by scoring >/= 15 seconds bilaterally on the sharpened Romberg test.  Goal Progress: R back 3 seconds, L back <1 second  Target Date: 08/12/24  PT Goal 4  Goal Identifier: Hip abduciton strength  Goal Description: Mishel will demonstrate bilateral hip abductor strength of >/=4+/5 without pain in order to improve the ease of their ADLs.  Goal Progress: 4-/5 B  Target Date: 08/12/24      Frequency of Treatment: 1 x/week  Duration of Treatment: 90 days    Education Assessment:        Risks and benefits of evaluation/treatment have been explained.   Patient/Family/caregiver agrees with Plan of Care.     Evaluation Time:     PT Eval, Moderate Complexity Minutes (93390): 15     Signing Clinician: " NILAM CA, PT      [facility]                                                                                   OUTPATIENT PHYSICAL THERAPY      PLAN OF TREATMENT FOR OUTPATIENT REHABILITATION   Patient's Last Name, First Name, Mishel Mccormick YOB: 1955   Provider's Name   [facility]   Medical Record No.  [id number]     Onset Date: 04/24/24  Start of Care Date: 05/14/24     Medical Diagnosis:  Diabetic polyneuropathy associated with type 2 diabetes mellitus (H)      PT Treatment Diagnosis:  Impaired balance Plan of Treatment  Frequency/Duration: 1 x/week/ 90 days    Certification date from 05/14/24 to 08/12/24         See note for plan of treatment details and functional goals     NILAM CA, PT                         I CERTIFY THE NEED FOR THESE SERVICES FURNISHED UNDER        THIS PLAN OF TREATMENT AND WHILE UNDER MY CARE     (Physician attestation of this document indicates review and certification of the therapy plan).              Referring Provider:  Shante Cohen MD    Initial Assessment  See Epic Evaluation- Start of Care Date: 05/14/24

## 2024-06-17 NOTE — PROGRESS NOTES
"    DISCHARGE  Reason for Discharge: Patient has failed to schedule further appointments.    Discharge Plan: Patient to continue home program.    Referring Provider:  Shante Cohen MD       05/14/24 0500   Appointment Info   Signing clinician's name / credentials Rob Chacko, PT, DPT, CMTPT/DN   Visits Used 1   Medical Diagnosis Diabetic polyneuropathy associated with type 2 diabetes mellitus (H)   PT Tx Diagnosis Impaired balance   Quick Adds Certification   Progress Note/Certification   Start of Care Date 05/14/24   Onset of illness/injury or Date of Surgery 04/24/24   Therapy Frequency 1 x/week   Predicted Duration 90 days   Certification date from 05/14/24   Certification date to 08/12/24   Progress Note Due Date 08/12/24   Progress Note Completed Date 05/14/24   GOALS   PT Goals 2;3;4   PT Goal 1   Goal Identifier FGA   Goal Description Mishel will demonstrate a decrease in pain and increase in function as measured by scoring >/= 23/30 on the FGA.   Goal Progress 15/30   Target Date 08/12/24   PT Goal 2   Goal Identifier 30\" sit to stand   Goal Description Mishel will demonstrate a decrease in pain and increase in function as measured by scoring >/= 12 reps without hands on the 30\" sit to stand test.   Goal Progress 7 without hands   Target Date 08/12/24   PT Goal 3   Goal Identifier Sharpened Romberg   Goal Description Mishel will demonstrate a decrease in pain and increase in function as measured by scoring >/= 15 seconds bilaterally on the sharpened Romberg test.   Goal Progress R back 3 seconds, L back <1 second   Target Date 08/12/24   PT Goal 4   Goal Identifier Hip abduciton strength   Goal Description Mishel will demonstrate bilateral hip abductor strength of >/=4+/5 without pain in order to improve the ease of their ADLs.   Goal Progress 4-/5 B   Target Date 08/12/24   Subjective Report   Subjective Report See eval   Treatment Interventions (PT)   Interventions Therapeutic " "Procedure/Exercise;Neuromuscular Re-education   Therapeutic Procedure/Exercise   Therapeutic Procedures: strength, endurance, ROM, flexibility minutes (91200) 10   Ther Proc 1 Sit to stand   Ther Proc 1 - Details 2 x10, cues to avoid back of legs hitting the chair   Therapeutic Procedures Ther Proc 2;Ther Proc 3   Ther Proc 2 Standing hip abduction   Ther Proc 2 - Details x10 B, cues to keep toes straight forward   Ther Proc 3 Education on how therapy can help improve strength and how that contributes to balance.   Neuromuscular Re-education   Neuromuscular re-ed of mvmt, balance, coord, kinesthetic sense, posture, proprioception minutes (19724) 13   Neuromuscular Re-education Neuro Re-ed 2;Neuro Re-ed 3   Neuro Re-ed 1 Semi-tandem stance   Neuro Re-ed 1 - Details 2 x30\" B   Neuro Re-ed 2 Romberg with head turns   Neuro Re-ed 2 - Details 2 x30\"   Neuro Re-ed 3 Education on how therapy can help improve balance.   Eval/Assessments   PT Eval, Moderate Complexity Minutes (25182) 15   Plan   Home program PTRx   Plan for next session Continue with balance training and LE strengthening.   Comments   Comments Patient is a 68 year old female with impaired balance complaints. The following significant findings have been identified: Decreased strength, Impaired balance, Impaired gait, and Impaired muscle performance. These impairments interfere with their ability to perform recreational activities and community mobility as compared to previous level of function.   Total Session Time   Timed Code Treatment Minutes 23   Total Treatment Time (sum of timed and untimed services) 38         "

## 2024-07-02 DIAGNOSIS — E11.42 TYPE 2 DIABETES MELLITUS WITH DIABETIC POLYNEUROPATHY, WITH LONG-TERM CURRENT USE OF INSULIN (H): ICD-10-CM

## 2024-07-02 DIAGNOSIS — Z79.4 TYPE 2 DIABETES MELLITUS WITH DIABETIC POLYNEUROPATHY, WITH LONG-TERM CURRENT USE OF INSULIN (H): ICD-10-CM

## 2024-07-02 RX ORDER — PEN NEEDLE, DIABETIC 32GX 5/32"
NEEDLE, DISPOSABLE MISCELLANEOUS
Qty: 90 EACH | Refills: 2 | Status: SHIPPED | OUTPATIENT
Start: 2024-07-02

## 2024-07-24 ENCOUNTER — OFFICE VISIT (OUTPATIENT)
Dept: FAMILY MEDICINE | Facility: CLINIC | Age: 69
End: 2024-07-24
Payer: MEDICARE

## 2024-07-24 VITALS
TEMPERATURE: 97.8 F | HEIGHT: 63 IN | OXYGEN SATURATION: 97 % | BODY MASS INDEX: 23.57 KG/M2 | RESPIRATION RATE: 20 BRPM | HEART RATE: 80 BPM | WEIGHT: 133 LBS | SYSTOLIC BLOOD PRESSURE: 122 MMHG | DIASTOLIC BLOOD PRESSURE: 68 MMHG

## 2024-07-24 DIAGNOSIS — Z79.4 TYPE 2 DIABETES MELLITUS WITH DIABETIC POLYNEUROPATHY, WITH LONG-TERM CURRENT USE OF INSULIN (H): Primary | ICD-10-CM

## 2024-07-24 DIAGNOSIS — R60.9 EDEMA, UNSPECIFIED TYPE: ICD-10-CM

## 2024-07-24 DIAGNOSIS — E11.42 DIABETIC POLYNEUROPATHY ASSOCIATED WITH TYPE 2 DIABETES MELLITUS (H): ICD-10-CM

## 2024-07-24 DIAGNOSIS — E11.42 TYPE 2 DIABETES MELLITUS WITH DIABETIC POLYNEUROPATHY, WITH LONG-TERM CURRENT USE OF INSULIN (H): Primary | ICD-10-CM

## 2024-07-24 LAB
ALBUMIN SERPL BCG-MCNC: 4.3 G/DL (ref 3.5–5.2)
ALP SERPL-CCNC: 91 U/L (ref 40–150)
ALT SERPL W P-5'-P-CCNC: 16 U/L (ref 0–50)
ANION GAP SERPL CALCULATED.3IONS-SCNC: 13 MMOL/L (ref 7–15)
AST SERPL W P-5'-P-CCNC: 19 U/L (ref 0–45)
BILIRUB SERPL-MCNC: 0.2 MG/DL
BUN SERPL-MCNC: 13.8 MG/DL (ref 8–23)
CALCIUM SERPL-MCNC: 9.8 MG/DL (ref 8.8–10.4)
CHLORIDE SERPL-SCNC: 98 MMOL/L (ref 98–107)
CREAT SERPL-MCNC: 0.48 MG/DL (ref 0.51–0.95)
EGFRCR SERPLBLD CKD-EPI 2021: >90 ML/MIN/1.73M2
GLUCOSE SERPL-MCNC: 286 MG/DL (ref 70–99)
HBA1C MFR BLD: >15 % (ref 0–5.6)
HCO3 SERPL-SCNC: 28 MMOL/L (ref 22–29)
HOLD SPECIMEN: NORMAL
POTASSIUM SERPL-SCNC: 4.5 MMOL/L (ref 3.4–5.3)
PROT SERPL-MCNC: 6.7 G/DL (ref 6.4–8.3)
SODIUM SERPL-SCNC: 139 MMOL/L (ref 135–145)

## 2024-07-24 PROCEDURE — 99214 OFFICE O/P EST MOD 30 MIN: CPT | Performed by: FAMILY MEDICINE

## 2024-07-24 PROCEDURE — 80053 COMPREHEN METABOLIC PANEL: CPT | Performed by: FAMILY MEDICINE

## 2024-07-24 PROCEDURE — 36415 COLL VENOUS BLD VENIPUNCTURE: CPT | Performed by: FAMILY MEDICINE

## 2024-07-24 PROCEDURE — G2211 COMPLEX E/M VISIT ADD ON: HCPCS | Performed by: FAMILY MEDICINE

## 2024-07-24 PROCEDURE — 83036 HEMOGLOBIN GLYCOSYLATED A1C: CPT | Performed by: FAMILY MEDICINE

## 2024-07-24 ASSESSMENT — PAIN SCALES - GENERAL: PAINLEVEL: NO PAIN (0)

## 2024-07-24 NOTE — PATIENT INSTRUCTIONS
Schedule a visit with diabetes education.  You are doing well and have knowledge from diet and lifestyle standpoint, I am having you see them to help adjust your insulin treatment to bring your blood sugars down more quickly while awaiting endocrinology visit.    Limit salt intake to 2000 mg of sodium or less daily.  Elevate your legs when you are seated.  Work on reintroducing regular exercise.    We will check your kidney and liver function, we may adjust Invokana depending on those results.

## 2024-07-24 NOTE — PROGRESS NOTES
Assessment & Plan     Type 2 diabetes mellitus with diabetic polyneuropathy, with long-term current use of insulin (H)  Inadequate control.  Patient is concerned that this is all due to diet and lifestyle--though she has significant opportunity to improve her habits, discussed with her that I suspect that much of her significant hyperglycemia is outside of her control but will require additional efforts and monitoring.  Referral is made to diabetes education for further assistance in adjusting insulin.  Continue use of Abran Freestyle 14 day CGM.  I will check kidney and liver function, could consider further titration of Invokana.  Advised that she resume regular physical activity.  She has appointment scheduled with endocrinology in November.  - Extra Tube; Future  - Extra Tube  - canagliflozin (INVOKANA) 100 MG tablet; Take 1 tablet (100 mg) by mouth every morning (before breakfast)  - Adult Diabetes Education  Referral; Future  - Comprehensive metabolic panel; Future  - Comprehensive metabolic panel    Diabetic polyneuropathy associated with type 2 diabetes mellitus (H)  Stable  - Extra Tube; Future  - Extra Tube    Edema, unspecified type  She has had this in the past, no obvious etiology, echocardiograms have been normal in the past.  We discussed options.  Advised that she reintroduce regular physical activity, limit salt, elevate legs.  We will check kidney and liver function.  Would consider increasing Invokana pending those results.                Letitia Florentino is a 69 year old, presenting for the following health issues:  Diabetes (Fasting/) and Foot Swelling    Seen today for follow-up of type 2 diabetes.  Admits that she is not checking her blood sugars.  She does not have any low sugars.  She remains compliant with metformin, Invokana, and Tresiba 6 units.  She tried abran meter in the past but was having significant difficulties with that, has not tried recently.  Has appoint with  "endocrinology in November.  Noticing that she has had some swelling in her feet to lower calf area over the past few weeks to months, slightly worse in her left and right.  Admits that she has been less active.  She has been eating watermelon with salt a little more often, perhaps once daily, no other significant changes in diet.    History of Present Illness       Diabetes:   She presents for follow up of diabetes.    She is not checking blood glucose.        She is concerned about blood sugar frequently over 200.   She is having burning in feet and excessive thirst.            She eats 4 or more servings of fruits and vegetables daily.She consumes 0 sweetened beverage(s) daily.She exercises with enough effort to increase her heart rate 9 or less minutes per day.  She exercises with enough effort to increase her heart rate 3 or less days per week. She is missing 2 dose(s) of medications per week.  She is not taking prescribed medications regularly due to other.                     Objective    /68   Pulse 86   Temp 97.8  F (36.6  C) (Temporal)   Resp 20   Ht 1.588 m (5' 2.5\")   Wt 60.3 kg (133 lb)   LMP  (LMP Unknown)   BMI 23.94 kg/m    Body mass index is 23.94 kg/m .  Physical Exam   Alert and pleasant.  Heart with regular rate and rhythm.  Lungs clear.  Abdomen soft and nontender.  Extremities with 1+ nonpitting edema to lower calf.    Office Visit on 07/24/2024   Component Date Value Ref Range Status    Hemoglobin A1C 07/24/2024 >15.0 (H)  0.0 - 5.6 % Final    Normal <5.7%   Prediabetes 5.7-6.4%    Diabetes 6.5% or higher     Note: Adopted from ADA consensus guidelines.                Signed Electronically by: Shante Cohen MD    "

## 2024-07-30 ENCOUNTER — VIRTUAL VISIT (OUTPATIENT)
Dept: EDUCATION SERVICES | Facility: CLINIC | Age: 69
End: 2024-07-30
Attending: FAMILY MEDICINE
Payer: MEDICARE

## 2024-07-30 DIAGNOSIS — Z79.4 TYPE 2 DIABETES MELLITUS WITH DIABETIC POLYNEUROPATHY, WITH LONG-TERM CURRENT USE OF INSULIN (H): Primary | ICD-10-CM

## 2024-07-30 DIAGNOSIS — E11.42 TYPE 2 DIABETES MELLITUS WITH DIABETIC POLYNEUROPATHY, WITH LONG-TERM CURRENT USE OF INSULIN (H): Primary | ICD-10-CM

## 2024-07-30 PROCEDURE — G0108 DIAB MANAGE TRN  PER INDIV: HCPCS | Mod: 93 | Performed by: DIETITIAN, REGISTERED

## 2024-07-30 RX ORDER — KETOROLAC TROMETHAMINE 30 MG/ML
1 INJECTION, SOLUTION INTRAMUSCULAR; INTRAVENOUS ONCE
Qty: 1 EACH | Refills: 0 | Status: SHIPPED | OUTPATIENT
Start: 2024-07-30 | End: 2024-07-30

## 2024-07-30 RX ORDER — BLOOD-GLUCOSE SENSOR
1 EACH MISCELLANEOUS
Qty: 6 EACH | Refills: 5 | Status: SHIPPED | OUTPATIENT
Start: 2024-07-30 | End: 2024-08-06 | Stop reason: ALTCHOICE

## 2024-07-30 NOTE — LETTER
7/30/2024         RE: Mishel Garza  [address] [place] [address]        Dear Colleague,    Thank you for referring your patient, Mishel Garza, to the [facility]. Please see a copy of my visit note below.    Diabetes Self-Management Education & Support    Presents for:      Type of Service: Telephone Visit      ASSESSMENT:  Diabetes education visit  Reports good support: , sister, friends  Most recent A1c vastly elevated    Endorses polydipsia, polyphagia    Denies any abdominal pain, N/V, shortness of breath or any other s/s of feeling ill.  We dicussed situations/sx requiring medical/emergent care/911 - pt expressed understanding.     Does not test BS - rx for Abran 3 sent in. She has used a abran system before and feels comfortable self starting it. Encouraged her to call us with nay questions.  Not interested in linking to  practice (so we can view her data remotely) at this point    Currently on:6 units tresiba daily, 2000 mg metformin daily and 100 mg invokana - tolerating well. No lows.  **Endorses missed doses of her meds the past few months, has been trying to get back on track now the last few days and notes how that's helping her feeling so much better    Rx for CGM sent in - pt will start monitoring her BG. We will follow up with her on 8/6 to review BG data and for med adjustment. She will continue to work on diet and lifestyle modification and call with any questions/concerns in the mean time or if 3 or more BS>200 in a 7 day period.    3 meals + 1 snack daily.   Endorses having gone off track - would give into chips, cookies often to satisfy her cravings, is trying to get back on track now    Is good with reading labels and watches the [employer] [place]  Food recall:  BF: 5 strawberries, 1/4 banana, toast (multigrain) or a small bagel (25 crabs) with peanut butter  L: water melon (1-2 cups), vegetables  D:casserole, salad    5 cups water and 4-5 diet pop.  "Will try to drink more water    No planned activity - says, she has not been motivated enough      Patient's most recent   Lab Results   Component Value Date    A1C >15.0 07/24/2024     is not meeting goal of <7.0    PLAN    Rx for CGM sent in - pt will start monitoring her BG. We will follow up with her on 8/6 to review BG data and for med adjustment. Revisit linking CGM to [facility] practice so we can view her data remotely?    We dicussed situations/sx requiring medical/emergent care/911 - pt expressed understanding.    Topics to cover at upcoming visits: Monitoring, Problem Solving, and Reducing Risks    Follow-up: 8/6    See Care Plan for co-developed, patient-state behavior change goals.  AVS provided for patient today.    Education Materials Provided:  Living Healthy with Diabetes, My Plate Planner, and carb foods and serving sizes, resources      SUBJECTIVE/OBJECTIVE:  Accompanied by: Self  Diabetes education in the past 24mo: Yes  Focus of Visit: Self-care behavioral goal setting, Assistance w/ making life changes, Reducing Risks, Taking Medication, Monitoring  Diabetes type: Type 2  Disease course: Worsening  Cultural Influences/Ethnic Background:  Not  or     Diabetes Symptoms & Complications:  Disease course: Worsening     Patient Problem List and Family Medical History reviewed for relevant medical history, current medical status, and diabetes risk factors.    Vitals:  LMP  (LMP Unknown)   Estimated body mass index is 23.94 kg/m  as calculated from the following:    Height as of 7/24/24: 1.588 m (5' 2.5\").    Weight as of 7/24/24: 60.3 kg (133 lb).   Last 3 BP:   BP Readings from Last 3 Encounters:   07/24/24 122/68   04/24/24 122/70   08/24/23 112/64       History   Smoking Status     Never   Smokeless Tobacco     Never       Labs:  Lab Results   Component Value Date    A1C >15.0 07/24/2024     Lab Results   Component Value Date     07/24/2024    GLC 75 02/24/2021     Lab Results " "  Component Value Date    LDL 82 04/24/2024     Direct Measure HDL   Date Value Ref Range Status   04/24/2024 68 >=50 mg/dL Final   ]  GFR Estimate   Date Value Ref Range Status   07/24/2024 >90 >60 mL/min/1.73m2 Final     Comment:     eGFR calculated using 2021 CKD-EPI equation.   02/24/2021 >60 >60 mL/min/1.73m2 Final     GFR Estimate If Black   Date Value Ref Range Status   02/24/2021 >60 >60 mL/min/1.73m2 Final     Lab Results   Component Value Date    CR 0.48 07/24/2024     No results found for: \"MICROALBUMIN\"    Healthy Eating:  Healthy Eating Assessed Today: Yes  Who cooks/prepares meals for you?: Self  Who purchases food in  your home?: Self    Being Active:  Being Active Assessed Today: Yes    Monitoring:  Monitoring Assessed Today: Yes    Taking Medications:  Diabetes Medication(s)       Biguanides       metFORMIN (GLUCOPHAGE) 500 MG tablet TAKE 2 TABLETS BY MOUTH TWICE DAILY WITH FOOD       Insulin       TRESIBA FLEXTOUCH 100 UNIT/ML pen INJECT 6 UNITS SUBCUTANEOUSLY ONCE DAILY       Sodium-Glucose Co-Transporter 2 (SGLT2) Inhibitors       canagliflozin (INVOKANA) 100 MG tablet Take 1 tablet (100 mg) by mouth every morning (before breakfast)            Healthy Coping:  Informal Support system:: Spouse  Stage of change: CONTEMPLATION (Considering change and yet undecided)  Patient Activation Measure Survey Score:       No data to display              Time Spent: 75 minutes  Encounter Type: Individual    Any diabetes medication dose changes were made via the CDE Protocol per the patient's referring provider. A copy of this encounter was shared with the provider.       "

## 2024-07-30 NOTE — PATIENT INSTRUCTIONS
Goals for Diabetes Care:     1. Eat 3 balanced meals each day - Do NOT skip/delay meals - plan ahead.  Use plate method/aim to eat a balanced plate - half your plate fruits/veggies, 1/4 the plate protein and 1/4 plate starch (rice, potato, pasta)  Switch to healthier carbs (whole grains, legumes/beans, fruit, dairy)  NO cookies, chips - switch to healthier snacks. Keep kitchen/pantry well stocked with healthy snack options like nuts, veggies, fruit, cottage cheese, trail mix etc  Drink more water.      Do not wait longer than 4-5 hours to eat something  Make sure you include protein source with each meal and at bedtime - this has been shown to help with blood glucose elevations     2. Check blood sugars at least 4 times each day: in the morning/fasting, at each of the three meals and bed time               Blood Glucose Targets:              1. Fasting and before meal target is 80 - 130              2. 2 hours after a meal target is < 180  ** Call if 3 high (> 200) or 2 low BS (or s/s of lows) in a 7-day period.     Always remember to bring meter and log book to all appointments.     3. Activity really helps improve blood sugars. Try to Incorporate 30 minutes activity into each day - does not need to be all at one time & walking counts!     4. Take diabetes medications as prescribed.  Keep taking your diabetes medications as directed on the bottle and call for a refill when your pills/injectables are getting low.     Follow up:8/6, 8:30 AM    Call with any questions.    Thank you!  Argentina Chavez RDN, LD, [facility]   Certified Diabetes Care &   Triage [phone]

## 2024-07-30 NOTE — PROGRESS NOTES
Diabetes Self-Management Education & Support    Presents for:      Type of Service: Telephone Visit      ASSESSMENT:  Diabetes education visit  Reports good support: , sister, friends  Most recent A1c vastly elevated    Endorses polydipsia, polyphagia    Denies any abdominal pain, N/V, shortness of breath or any other s/s of feeling ill.  We dicussed situations/sx requiring medical/emergent care/911 - pt expressed understanding.     Does not test BS - rx for Abran 3 sent in. She has used a abran system before and feels comfortable self starting it. Encouraged her to call us with nay questions.  Not interested in linking to  practice (so we can view her data remotely) at this point    Currently on:6 units tresiba daily, 2000 mg metformin daily and 100 mg invokana - tolerating well. No lows.  **Endorses missed doses of her meds the past few months, has been trying to get back on track now the last few days and notes how that's helping her feeling so much better    Rx for CGM sent in - pt will start monitoring her BG. We will follow up with her on 8/6 to review BG data and for med adjustment. She will continue to work on diet and lifestyle modification and call with any questions/concerns in the mean time or if 3 or more BS>200 in a 7 day period.    3 meals + 1 snack daily.   Endorses having gone off track - would give into chips, cookies often to satisfy her cravings, is trying to get back on track now    Is good with reading labels and watches the [employer]  Food recall:  BF: 5 strawberries, 1/4 banana, toast (multigrain) or a small bagel (25 crabs) with peanut butter  L: water melon (1-2 cups), vegetables  D:casserole, salad    5 cups water and 4-5 diet pop. Will try to drink more water    No planned activity - says, she has not been motivated enough      Patient's most recent   Lab Results   Component Value Date    A1C >15.0 07/24/2024     is not meeting goal of <7.0    PLAN    Rx for CGM sent in - pt will  "start monitoring her BG. We will follow up with her on 8/6 to review BG data and for med adjustment. Revisit linking CGM to [facility] practice so we can view her data remotely?    We dicussed situations/sx requiring medical/emergent care/911 - pt expressed understanding.    Topics to cover at upcoming visits: Monitoring, Problem Solving, and Reducing Risks    Follow-up: 8/6    See Care Plan for co-developed, patient-state behavior change goals.  AVS provided for patient today.    Education Materials Provided:  Living Healthy with Diabetes, My Plate Planner, and carb foods and serving sizes, resources      SUBJECTIVE/OBJECTIVE:  Accompanied by: Self  Diabetes education in the past 24mo: Yes  Focus of Visit: Self-care behavioral goal setting, Assistance w/ making life changes, Reducing Risks, Taking Medication, Monitoring  Diabetes type: Type 2  Disease course: Worsening  Cultural Influences/Ethnic Background:  Not  or     Diabetes Symptoms & Complications:  Disease course: Worsening     Patient Problem List and Family Medical History reviewed for relevant medical history, current medical status, and diabetes risk factors.    Vitals:  LMP  (LMP Unknown)   Estimated body mass index is 23.94 kg/m  as calculated from the following:    Height as of 7/24/24: 1.588 m (5' 2.5\").    Weight as of 7/24/24: 60.3 kg (133 lb).   Last 3 BP:   BP Readings from Last 3 Encounters:   07/24/24 122/68   04/24/24 122/70   08/24/23 112/64       History   Smoking Status    Never   Smokeless Tobacco    Never       Labs:  Lab Results   Component Value Date    A1C >15.0 07/24/2024     Lab Results   Component Value Date     07/24/2024    GLC 75 02/24/2021     Lab Results   Component Value Date    LDL 82 04/24/2024     Direct Measure HDL   Date Value Ref Range Status   04/24/2024 68 >=50 mg/dL Final   ]  GFR Estimate   Date Value Ref Range Status   07/24/2024 >90 >60 mL/min/1.73m2 Final     Comment:     eGFR calculated using 2021 " "CKD-EPI equation.   02/24/2021 >60 >60 mL/min/1.73m2 Final     GFR Estimate If Black   Date Value Ref Range Status   02/24/2021 >60 >60 mL/min/1.73m2 Final     Lab Results   Component Value Date    CR 0.48 07/24/2024     No results found for: \"MICROALBUMIN\"    Healthy Eating:  Healthy Eating Assessed Today: Yes  Who cooks/prepares meals for you?: Self  Who purchases food in  your home?: Self    Being Active:  Being Active Assessed Today: Yes    Monitoring:  Monitoring Assessed Today: Yes    Taking Medications:  Diabetes Medication(s)       Biguanides       metFORMIN (GLUCOPHAGE) 500 MG tablet TAKE 2 TABLETS BY MOUTH TWICE DAILY WITH FOOD       Insulin       TRESIBA FLEXTOUCH 100 UNIT/ML pen INJECT 6 UNITS SUBCUTANEOUSLY ONCE DAILY       Sodium-Glucose Co-Transporter 2 (SGLT2) Inhibitors       canagliflozin (INVOKANA) 100 MG tablet Take 1 tablet (100 mg) by mouth every morning (before breakfast)            Healthy Coping:  Informal Support system:: Spouse  Stage of change: CONTEMPLATION (Considering change and yet undecided)  Patient Activation Measure Survey Score:       No data to display              Time Spent: 75 minutes  Encounter Type: Individual    Any diabetes medication dose changes were made via the CDE Protocol per the patient's referring provider. A copy of this encounter was shared with the provider.   "

## 2024-08-02 ENCOUNTER — TELEPHONE (OUTPATIENT)
Dept: FAMILY MEDICINE | Facility: CLINIC | Age: 69
End: 2024-08-02
Payer: MEDICARE

## 2024-08-02 DIAGNOSIS — Z79.4 TYPE 2 DIABETES MELLITUS WITH DIABETIC POLYNEUROPATHY, WITH LONG-TERM CURRENT USE OF INSULIN (H): Primary | ICD-10-CM

## 2024-08-02 DIAGNOSIS — E11.42 TYPE 2 DIABETES MELLITUS WITH DIABETIC POLYNEUROPATHY, WITH LONG-TERM CURRENT USE OF INSULIN (H): Primary | ICD-10-CM

## 2024-08-02 NOTE — TELEPHONE ENCOUNTER
Hello,    This is [facility] requesting the visit notes from 7/24  be addended to include that the patient is on  CGM ( freestyle abran 14 day) in the notes portion of the visit. This is in regards to the patient's freestyle abran supply and to be Medicare compliant for coverage. The patient does not want to pay out of pocket for a new reader $325.00 so she wants to stay on the Freestyle Abran 14 day. We are requesting a new prescription for the Abran 14 day sensors:       Prescription must be written by: MD Shante Acevedo  Must include full supply name: Freestyle Abran 14 day sensors  Quantity: 6  Refills: 1  SIG: Change every 14 days    If you have any questions regarding this request, please call us at [phone].    Thank you,  [facility] - Diabetes Care Services Team

## 2024-08-05 NOTE — TELEPHONE ENCOUNTER
This message is to inform you that we are in need of Medicare compliant prescriptions for Freestyle Abran 14 Day Sensors.     Prescription must be written by: MD Shante Cohen.  Must include full supply name: Freestyle Abran 14 day sensors  Quantity: 6  Refills: 1  SIG: Change every 14 days      As a reminder, Medicare requires patients to be seen every 3 months for insulin supplies and every 6 months for CGMs. The provider who sees the patient must be the provider on the prescription, must be written on the day of or after office visit date and office visit must include notes about diabetes and insulin regimen. The Diabetes Care Services team at [facility] may request new prescriptions before renewal dates of the prescription is filled over the allowable amount. Writing the order to match what is above will help ensure we will only need to request every 3 or 6 months.    Thank you!    [facility]  Diabetes Care Services Team  [address] [place] [address]  Provider Phone: [phone]  Patient Line: [phone]  Fax: [phone]  E-mail: [email or web address]

## 2024-08-06 ENCOUNTER — VIRTUAL VISIT (OUTPATIENT)
Dept: EDUCATION SERVICES | Facility: CLINIC | Age: 69
End: 2024-08-06
Payer: MEDICARE

## 2024-08-06 DIAGNOSIS — E11.42 TYPE 2 DIABETES MELLITUS WITH DIABETIC POLYNEUROPATHY, WITH LONG-TERM CURRENT USE OF INSULIN (H): ICD-10-CM

## 2024-08-06 DIAGNOSIS — Z79.4 TYPE 2 DIABETES MELLITUS WITH DIABETIC POLYNEUROPATHY, WITH LONG-TERM CURRENT USE OF INSULIN (H): ICD-10-CM

## 2024-08-06 PROCEDURE — 97802 MEDICAL NUTRITION INDIV IN: CPT | Mod: 93 | Performed by: REGISTERED NURSE

## 2024-08-06 RX ORDER — FLASH GLUCOSE SENSOR
KIT MISCELLANEOUS
Qty: 2 EACH | Refills: 5 | Status: SHIPPED | OUTPATIENT
Start: 2024-08-06

## 2024-08-06 RX ORDER — INSULIN DEGLUDEC 100 U/ML
INJECTION, SOLUTION SUBCUTANEOUS
Qty: 9 ML | Refills: 4 | Status: SHIPPED | OUTPATIENT
Start: 2024-08-06

## 2024-08-06 RX ORDER — FLASH GLUCOSE SENSOR
KIT MISCELLANEOUS
Qty: 2 EACH | Refills: 5 | Status: SHIPPED | OUTPATIENT
Start: 2024-08-06 | End: 2024-08-06

## 2024-08-06 NOTE — PATIENT INSTRUCTIONS
1. Test blood sugar once per day in the morning with a meter until you get your FreeStyle Abran 14 day sensors.  2. Increase Tresiba to 8 units at bedtime.  3. Continue Invokana and Metformin at current doses.  4. It is OK to increase or decrease by 2 units every 3 days until the morning blood sugar is .   5. Include protein at meals and snacks.   6. Aim for foods with 3 grams of fiber or more per serving.   7. Limit added sugar to 24 grams or less per day.  8. Stay active every day; try not to sit for more than 30 minutes and walk 20 minutes 5 days per week.  9. Next visit in 3-4 weeks to download the FreeStyle Abran 14 day reader.    Blood sugar targets:   Before meals:   2 hours after meals: less 180  Bedtime:     A1c target of less than 7.0% (estimated average blood sugar of 154 on your meter)    Snack Ideas for your Meal Plan     Protein (1 serving = 6-8 grams of protein each)  Beef Jerky ( 2 pieces)  Beef Sticks, plain (1 stick)  Cheese/Cheese Stick (1 oz)  Chicken breast (1 oz)  Cottage cheese, low fat (1/4 cup)  Crab, Imitation (2 oz)  Deli Meat (2 oz)  Edamame, boiled (1/2 cup)  Egg, hardboiled (1)  Shrimp, small (10 pieces)   Turkey Breast (1 oz)  Tuna, canned in water (1 oz)  Fairlife Milk (1/2 cup)  Yogurt, Greek : Siggis, Oikos Triple Zero, Dannon Light and Fit, etc (6 oz)    Carb (1 carb choice/serving = 15 grams)   Apple (medium)  Applesauce, unsweetened (1/2 cup)  Apricots, dried (4 whole)  Banana, medium (1/2)  Bread, 1 slice   Cantaloupe/Melon (1 cup)  Crackers 4-6 (varies by brand)  Cherries (15)  Cranberries, Dried (2 tbsp)  Dark Chocolate (1 oz)  Dates, dried (2-3 pieces)  Fruit cocktail, in own juice (1/2 cup)  Granola Bar (vary by brand)  Grapes (1/2 cup)  Ice cream, slow churned/low fat (1/2 cup)  Kiwi (~2)  Mixed Berries (1 cup)  Orange (1 medium)  Peach (1 medium)  Pear (1/2 medium)  Plums (2)  Pomegranate (1 small)  Popcorn, stove popped (2 cups)  Pretzels (3/4  oz)  Pudding, sugar free (1/2 cup)  Raisins (2 Tbsp)  Rice Cake, (2)  Skim or 1% milk (1 cup)  Tortilla Chips (1 oz)  Yogurt (greek or plain)   cup    Fat (1 serving = 100 calories)  Almonds (2 Tbsp)  Avocado (1/3 fruit OR 3 Tbsp)  Cashews (11 whole)  Cheese (1 oz)  Chocolate, dark (3/4 oz)  Coconut, unsweetened (1/3 c shredded)  Hummus (3 Tbsp)  Peanuts (20 pieces)  Peanut/Nut Butter (1 Tbsp)  Pecans (10 halves)  Pumpkin seeds, unshelled (2 Tbsp)  Salad dressing  (1-2 Tbsp)  Sunflower seeds (2 Tbsp)  Vegetable Dip (1-2 Tbsp)  Walnuts (8 halves)    Free Foods  Bell Peppers  Broccoli  Carrots   Cauliflower   Celery  Coffee, black (regular or decaf)  Cucumber  Gelatin, Sugar Free  Herbal Tea, unsweetened   Pea Pods  Pickles (high in sodium)   Popsicle, Sugar Free  Salsa (2 Tbsp)  Tomatoes    Combination Snacks  Apple + 1 Tbsp peanut butter (carbohydrate + fat)  Handful crackers + 1 oz cheese (carbohydrate + fat or protein)  Carrot sticks + Hummus (free food + fat)  1 piece of peanut butter toast (carbohydrate + fat)  Greek yogurt + 2 Tbsp sunflower seeds (carbohydrate + fat)  Hard-boiled egg +   cup grapes (protein + carbohydrate)  1 piece of avocado toast (carbohydrate + fat)  Cucumbers + dip (free food + fat)

## 2024-08-06 NOTE — LETTER
8/6/2024         RE: Mishel Garza  [address]  [facility] [address]        Dear Colleague,    Thank you for referring your patient, Mishel Garza, to the [facility]. Please see a copy of my visit note below.    Diabetes Self-Management Education & Support  Type of Service: Telephone Visit    Originating Location (Patient Location): Home  Distant Location (Provider Location): [facility]  Mode of Communication:  Telephone    Telephone Visit Start Time:  8:30 AM  Telephone Visit End Time (telephone visit stop time): 9:33 AM    How would patient like to obtain AVS? MyChart      ASSESSMENT:  2-week follow-up for diabetes education and counseling.  Mishel is checking her fasting blood sugar daily with her glucometer.  All fasting blood sugars are above goal.  Per medication protocol increased Tresiba by 2 units.  Mishel ran out of Freestyle Abran 14-day sensors.  Patient does not intend on upgrading to a Freestyle Abran 3 CGM.  It has been less than 5 years since she received a reader and does not want to pay out-of-pocket.  Renewed the prescription for the Freestyle Abran 14-day CGM.  Discussed her food choices.  Mishel is eating a low-carb diet and is making healthy food choices.  Her next area focus will be to increase activity.      FBG: Oldest to newest  266, 208, 218, 177, 147, 181, 190    Patient's most recent   Lab Results   Component Value Date    A1C >15.0 07/24/2024     is not meeting goal of <7.0    Diabetes knowledge and skills assessment:   Patient is knowledgeable in diabetes management concepts related to: Healthy Eating, Monitoring, Taking Medication, and Healthy Coping    Continue education with the following diabetes management concepts: Being Active, Problem Solving, Reducing Risks, and CGM interpretation    Based on learning assessment above, most appropriate setting for further diabetes education would be: Individual setting.   "    PLAN  1. Test blood sugar once per day in the morning with a meter until you get your FreeStyle Abran 14 day sensors.  2. Increase Tresiba to 8 units at bedtime.  3. Continue Invokana and Metformin at current doses.  4. It is OK to increase or decrease by 2 units every 3 days until the morning blood sugar is .   5. Include protein at meals and snacks.   6. Aim for foods with 3 grams of fiber or more per serving.   7. Limit added sugar to 24 grams or less per day.  8. Stay active every day; try not to sit for more than 30 minutes and walk 20 minutes 5 days per week.  9. Next visit in 3-4 weeks to download the FreeStyle Abran 14 day reader.      See Care Plan for co-developed, patient-state behavior change goals.  AVS provided for patient today.    Education Materials Provided:  Hypoglycemia Signs and Symptoms    SUBJECTIVE/OBJECTIVE:  Presents for: Follow-up  Accompanied by: Self  Diabetes education in the past 24mo: Yes  Focus of Visit: Assistance w/ making life changes, Taking Medication, Problem Solving  Diabetes type: Type 2  Date of diagnosis: ?2009 about 15+ years ago  Disease course: Worsening  Cultural Influences/Ethnic Background:  Not  or     Diabetes Symptoms & Complications:  Weight trend: Fluctuating  Disease course: Worsening  Complications assessed today?: Yes  Autonomic neuropathy: No  CVA: No  Heart disease: No  Nephropathy: No  Peripheral neuropathy: Yes  Peripheral Vascular Disease: No  Retinopathy: No    Patient Problem List and Family Medical History reviewed for relevant medical history, current medical status, and diabetes risk factors.    Vitals:  LMP  (LMP Unknown)   Estimated body mass index is 23.94 kg/m  as calculated from the following:    Height as of 7/24/24: 1.588 m (5' 2.5\").    Weight as of 7/24/24: 60.3 kg (133 lb).   Last 3 BP:   BP Readings from Last 3 Encounters:   07/24/24 122/68   04/24/24 122/70   08/24/23 112/64       History   Smoking Status     Never " "  Smokeless Tobacco     Never       Labs:  Lab Results   Component Value Date    A1C >15.0 07/24/2024     Lab Results   Component Value Date     07/24/2024    GLC 75 02/24/2021     Lab Results   Component Value Date    LDL 82 04/24/2024     Direct Measure HDL   Date Value Ref Range Status   04/24/2024 68 >=50 mg/dL Final   ]  GFR Estimate   Date Value Ref Range Status   07/24/2024 >90 >60 mL/min/1.73m2 Final     Comment:     eGFR calculated using 2021 CKD-EPI equation.   02/24/2021 >60 >60 mL/min/1.73m2 Final     GFR Estimate If Black   Date Value Ref Range Status   02/24/2021 >60 >60 mL/min/1.73m2 Final     Lab Results   Component Value Date    CR 0.48 07/24/2024     No results found for: \"MICROALBUMIN\"    Healthy Eating:  Healthy Eating Assessed Today: Yes  Do you have any food allergies or intolerances?: No  Who cooks/prepares meals for you?: Self  Who purchases food in  your home?: Self  How many times a week on average do you eat food made away from home (restaurant/take-out)?: 1  Meals include: Breakfast, Lunch, Dinner, Evening Snack, Afternoon Snack  Breakfast: 7:00 am blueberries, strawberries, banana with 15 grain, thin-sliced bread (2 slices)  Lunch: 12:30 pm crackers and cheese with carrots and celery, watermelon or apple or salad  Dinner: 5:30 pm \"supper\" chicken strips, salad, fruit ( 1/3 peach) or a little pasta, salad and fruit or taco with soft shell tortilla (13 g carb), salad, fruit and 1 small spoon of [place] rice.  Snacks: fruit (grapes or watermelon) or HS dry roasted peanuts. Craving something sweet-has fruit  Other: Lives with  and daughter. Avoiding potatoes, garlic bread, rice, stuffing.  Beverages: Diet soda, Water (decreased to 24 oz diet coke per day)  Please elaborate:: low carb juice (2 g carb per cup)  Has patient met with a dietitian in the past?: Yes    Being Active:  Being Active Assessed Today: Yes  Exercise:: Currently not exercising  Barrier to exercise: " None    Monitoring:  Monitoring Assessed Today: Yes  Did patient bring glucose meter to appointment? : Yes  Blood Glucose Meter: One Touch (OT Verio Flex)  Times checking blood sugar at home (number): 1  Times checking blood sugar at home (per): Day  Blood glucose trend: Decreasing    Taking Medications:  Diabetes Medication(s)       Biguanides       metFORMIN (GLUCOPHAGE) 500 MG tablet TAKE 2 TABLETS BY MOUTH TWICE DAILY WITH FOOD       Insulin       TRESIBA FLEXTOUCH 100 UNIT/ML pen Take 8 units daily + use 2 unit prime with each dose and titration for a total of 15 units/day       Sodium-Glucose Co-Transporter 2 (SGLT2) Inhibitors       canagliflozin (INVOKANA) 100 MG tablet Take 1 tablet (100 mg) by mouth every morning (before breakfast)          Taking Medication Assessed Today: Yes  Current Treatments: Diet, Insulin Injections, Oral Medication (taken by mouth)  Dose schedule: At bedtime  Given by: Patient  Injection/Infusion sites: Abdomen  Problems taking diabetes medications regularly?: No  Diabetes medication side effects?: No    Problem Solving:  Problem Solving Assessed Today: Yes  Is the patient at risk for hypoglycemia?: Yes  Hypoglycemia Frequency: Rarely  Hypoglycemia Treatment: Other food (banana)    Hypoglycemia Symptoms  Hypoglycemia: Sweats (blurry vision)    Hypoglycemia Complications  Hypoglycemia Complications: None    Reducing Risks:  Reducing Risks Assessed Today: Yes  Diabetes Risks: Age over 45 years, Sedentary Lifestyle, Hyperlipidemia, Family History  CAD Risks: Diabetes Mellitus, Dyslipidemia, Hypertension, Post-menopausal, Sedentary lifestyle  Has dilated eye exam at least once a year?: Yes  Sees dentist every 6 months?: Yes  Feet checked by healthcare provider in the last year?: Yes    Healthy Coping:  Healthy Coping Assessed Today: Yes  Emotional response to diabetes: Ready to learn  Informal Support system:: Spouse  Stage of change: ACTION (Actively working towards change)  Support  resources: None  Patient Activation Measure Survey Score:       No data to display                  Care Plan and Education Provided:  Care Plan: Diabetes   Updates made by Lety Merritt RD since 8/6/2024 12:00 AM        Problem: HbA1C Not In Goal         Goal: Establish Regular Follow-Ups with PCP         Task: Discuss with PCP the recommended timing for patient's next follow up visit(s)    Responsible User: Lety Merritt RD        Task: Discuss schedule for PCP visits with patient Completed 8/6/2024   Responsible User: Lety Merritt RD        Goal: Get HbA1C Level in Goal         Task: Educate patient on diabetes education self-management topics    Responsible User: Lety Merritt RD        Task: Educate patient on benefits of regular glucose monitoring    Responsible User: Lety Merritt RD        Task: Refer patient to appropriate extended care team member, as needed (Medication Therapy Management, Behavioral Health, Physical Therapy, etc.)    Responsible User: Lety Merritt RD        Task: Discuss diabetes treatment plan with patient    Responsible User: Lety Merritt RD        Problem: Diabetes Self-Management Education Needed to Optimize Self-Care Behaviors         Goal: Understand diabetes pathophysiology and disease progression         Task: Provide education on diabetes pathophysiology and disease progression specfic to patient's diabetes type    Responsible User: Lety Merritt RD        Goal: Healthy Eating - follow a healthy eating pattern for diabetes         Task: Provide education on portion control and consistency in amount, composition and timing of food intake    Responsible User: Lety Merritt RD        Task: Provide education on managing carbohydrate intake (carbohydrate counting, plate planning method, etc.)    Responsible User: Lety Merritt RD        Task: Provide education on weight management    Responsible User: Lety Merritt RD        Task: Provide education  on heart healthy eating    Responsible User: Lety Merritt RD        Task: Provide education on eating out    Responsible User: Lety Merritt RD        Task: Develop individualized healthy eating plan with patient Completed 8/6/2024   Responsible User: Lety Merritt RD        Goal: Being Active - get regular physical activity, working up to at least 150 minutes per week    This Visit's Progress: 0%        Task: Provide education on relationship of activity to glucose and precautions to take if at risk for low glucose    Responsible User: Lety Merritt RD        Task: Discuss barriers to physical activity with patient    Responsible User: Lety Merritt RD        Task: Develop physical activity plan with patient Completed 8/6/2024   Responsible User: Lety Merritt RD        Task: Explore community resources including walking groups, assistance programs, and home videos    Responsible User: Lety Merritt RD        Goal: Monitoring - monitor glucose and ketones as directed    This Visit's Progress: 100%        Task: Provide education on blood glucose monitoring (purpose, proper technique, frequency, glucose targets, interpreting results, when to use glucose control solution, sharps disposal)    Responsible User: Lety Merritt RD        Task: Provide education on continuous glucose monitoring (sensor placement, use of valdo or /reader, understanding glucose trends, alerts and alarms, differences between sensor glucose and blood glucose)    Responsible User: Lety Merritt RD        Task: Provide education on ketone monitoring (when to monitor, frequency, etc.)    Responsible User: Lety Merritt RD        Goal: Taking Medication - patient is consistently taking medications as directed         Task: Provide education on action of prescribed medication, including when to take and possible side effects    Responsible User: Lety Merritt RD        Task: Provide education on insulin and  injectable diabetes medications, including administration, storage, site selection and rotation for injection sites Completed 8/6/2024   Responsible User: Lety Merritt RD        Task: Discuss barriers to medication adherence with patient and provide management technique ideas as appropriate    Responsible User: Lety Merritt RD        Task: Provide education on frequency and refill details of medications    Responsible User: Lety Merritt RD        Goal: Problem Solving - know how to prevent and manage short-term diabetes complications         Task: Provide education on high blood glucose - causes, signs/symptoms, prevention and treatment Completed 8/6/2024   Responsible User: Lety Merritt RD        Task: Provide education on low blood glucose - causes, signs/symptoms, prevention, treatment, carrying a carbohydrate source at all times, and medical identification Completed 8/6/2024   Responsible User: Lety Merritt RD        Task: Provide education on safe travel with diabetes    Responsible User: Lety Merritt RD        Task: Provide education on how to care for diabetes on sick days    Responsible User: Lety Merritt RD        Task: Provide education on when to call a health care provider    Responsible User: Lety Merritt RD        Goal: Reducing Risks - know how to prevent and treat long-term diabetes complications         Task: Provide education on major complications of diabetes, prevention, early diagnostic measures and treatment of complications    Responsible User: Lety Merritt RD        Task: Provide education on recommended care for dental, eye and foot health    Responsible User: Lety Merritt RD        Task: Provide education on Hemoglobin A1c - goals and relationship to blood glucose levels Completed 8/6/2024   Responsible User: Lety Merritt RD        Task: Provide education on recommendations for heart health - lipid levels and goals, blood pressure and goals, and  aspirin therapy, if indicated    Responsible User: Lety Merritt RD        Task: Provide education on tobacco cessation    Responsible User: Lety Merritt RD        Goal: Healthy Coping - use available resources to cope with the challenges of managing diabetes         Task: Discuss recognizing feelings about having diabetes    Responsible User: Lety Merritt RD        Task: Provide education on the benefits of making appropriate lifestyle changes    Responsible User: Lety Merritt RD        Task: Provide education on benefits of utilizing support systems    Responsible User: Lety Merritt RD        Task: Discuss methods for coping with stress    Responsible User: Lety Merritt RD        Task: Provide education on when to seek professional counseling    Responsible User: Lety Merritt RD Joy Smetanka RD, LD, [facility]   Certified Diabetes Care and   [facility] Tuesdays and Thursdays  [facility] Wednesdays-virtual visits only    Time Spent: 60 minutes  Encounter Type: Individual    Any diabetes medication dose changes were made via the CDE Protocol per the patient's primary care provider. A copy of this encounter was shared with the provider.

## 2024-08-06 NOTE — PROGRESS NOTES
Diabetes Self-Management Education & Support  Type of Service: Telephone Visit    Originating Location (Patient Location): Home  Distant Location (Provider Location): [facility]  Mode of Communication:  Telephone    Telephone Visit Start Time:  8:30 AM  Telephone Visit End Time (telephone visit stop time): 9:33 AM    How would patient like to obtain AVS? Jesehart      ASSESSMENT:  2-week follow-up for diabetes education and counseling.  Mishel is checking her fasting blood sugar daily with her glucometer.  All fasting blood sugars are above goal.  Per medication protocol increased Tresiba by 2 units.  Mishel ran out of Freestyle Abran 14-day sensors.  Patient does not intend on upgrading to a Freestyle Abran 3 CGM.  It has been less than 5 years since she received a reader and does not want to pay out-of-pocket.  Renewed the prescription for the Freestyle Abran 14-day CGM.  Discussed her food choices.  Mishel is eating a low-carb diet and is making healthy food choices.  Her next area focus will be to increase activity.      FBG: Oldest to newest  266, 208, 218, 177, 147, 181, 190    Patient's most recent   Lab Results   Component Value Date    A1C >15.0 07/24/2024     is not meeting goal of <7.0    Diabetes knowledge and skills assessment:   Patient is knowledgeable in diabetes management concepts related to: Healthy Eating, Monitoring, Taking Medication, and Healthy Coping    Continue education with the following diabetes management concepts: Being Active, Problem Solving, Reducing Risks, and CGM interpretation    Based on learning assessment above, most appropriate setting for further diabetes education would be: Individual setting.      PLAN  1. Test blood sugar once per day in the morning with a meter until you get your FreeStyle Abran 14 day sensors.  2. Increase Tresiba to 8 units at bedtime.  3. Continue Invokana and Metformin at current doses.  4. It is OK to increase or decrease by  "2 units every 3 days until the morning blood sugar is .   5. Include protein at meals and snacks.   6. Aim for foods with 3 grams of fiber or more per serving.   7. Limit added sugar to 24 grams or less per day.  8. Stay active every day; try not to sit for more than 30 minutes and walk 20 minutes 5 days per week.  9. Next visit in 3-4 weeks to download the FreeStyle Abran 14 day reader.      See Care Plan for co-developed, patient-state behavior change goals.  AVS provided for patient today.    Education Materials Provided:  Hypoglycemia Signs and Symptoms    SUBJECTIVE/OBJECTIVE:  Presents for: Follow-up  Accompanied by: Self  Diabetes education in the past 24mo: Yes  Focus of Visit: Assistance w/ making life changes, Taking Medication, Problem Solving  Diabetes type: Type 2  Date of diagnosis: ?2009 about 15+ years ago  Disease course: Worsening  Cultural Influences/Ethnic Background:  Not  or     Diabetes Symptoms & Complications:  Weight trend: Fluctuating  Disease course: Worsening  Complications assessed today?: Yes  Autonomic neuropathy: No  CVA: No  Heart disease: No  Nephropathy: No  Peripheral neuropathy: Yes  Peripheral Vascular Disease: No  Retinopathy: No    Patient Problem List and Family Medical History reviewed for relevant medical history, current medical status, and diabetes risk factors.    Vitals:  LMP  (LMP Unknown)   Estimated body mass index is 23.94 kg/m  as calculated from the following:    Height as of 7/24/24: 1.588 m (5' 2.5\").    Weight as of 7/24/24: 60.3 kg (133 lb).   Last 3 BP:   BP Readings from Last 3 Encounters:   07/24/24 122/68   04/24/24 122/70   08/24/23 112/64       History   Smoking Status    Never   Smokeless Tobacco    Never       Labs:  Lab Results   Component Value Date    A1C >15.0 07/24/2024     Lab Results   Component Value Date     07/24/2024    GLC 75 02/24/2021     Lab Results   Component Value Date    LDL 82 04/24/2024     Direct " "Measure HDL   Date Value Ref Range Status   04/24/2024 68 >=50 mg/dL Final   ]  GFR Estimate   Date Value Ref Range Status   07/24/2024 >90 >60 mL/min/1.73m2 Final     Comment:     eGFR calculated using 2021 CKD-EPI equation.   02/24/2021 >60 >60 mL/min/1.73m2 Final     GFR Estimate If Black   Date Value Ref Range Status   02/24/2021 >60 >60 mL/min/1.73m2 Final     Lab Results   Component Value Date    CR 0.48 07/24/2024     No results found for: \"MICROALBUMIN\"    Healthy Eating:  Healthy Eating Assessed Today: Yes  Do you have any food allergies or intolerances?: No  Who cooks/prepares meals for you?: Self  Who purchases food in  your home?: Self  How many times a week on average do you eat food made away from home (restaurant/take-out)?: 1  Meals include: Breakfast, Lunch, Dinner, Evening Snack, Afternoon Snack  Breakfast: 7:00 am blueberries, strawberries, banana with 15 grain, thin-sliced bread (2 slices)  Lunch: 12:30 pm crackers and cheese with carrots and celery, watermelon or apple or salad  Dinner: 5:30 pm \"supper\" chicken strips, salad, fruit ( 1/3 peach) or a little pasta, salad and fruit or taco with soft shell tortilla (13 g carb), salad, fruit and 1 small spoon of Icelandic rice.  Snacks: fruit (grapes or watermelon) or HS dry roasted peanuts. Craving something sweet-has fruit  Other: Lives with  and daughter. Avoiding potatoes, garlic bread, rice, stuffing.  Beverages: Diet soda, Water (decreased to 24 oz diet coke per day)  Please elaborate:: low carb juice (2 g carb per cup)  Has patient met with a dietitian in the past?: Yes    Being Active:  Being Active Assessed Today: Yes  Exercise:: Currently not exercising  Barrier to exercise: None    Monitoring:  Monitoring Assessed Today: Yes  Did patient bring glucose meter to appointment? : Yes  Blood Glucose Meter: One Touch (OT Verio Flex)  Times checking blood sugar at home (number): 1  Times checking blood sugar at home (per): Day  Blood glucose " trend: Decreasing    Taking Medications:  Diabetes Medication(s)       Biguanides       metFORMIN (GLUCOPHAGE) 500 MG tablet TAKE 2 TABLETS BY MOUTH TWICE DAILY WITH FOOD       Insulin       TRESIBA FLEXTOUCH 100 UNIT/ML pen Take 8 units daily + use 2 unit prime with each dose and titration for a total of 15 units/day       Sodium-Glucose Co-Transporter 2 (SGLT2) Inhibitors       canagliflozin (INVOKANA) 100 MG tablet Take 1 tablet (100 mg) by mouth every morning (before breakfast)          Taking Medication Assessed Today: Yes  Current Treatments: Diet, Insulin Injections, Oral Medication (taken by mouth)  Dose schedule: At bedtime  Given by: Patient  Injection/Infusion sites: Abdomen  Problems taking diabetes medications regularly?: No  Diabetes medication side effects?: No    Problem Solving:  Problem Solving Assessed Today: Yes  Is the patient at risk for hypoglycemia?: Yes  Hypoglycemia Frequency: Rarely  Hypoglycemia Treatment: Other food (banana)    Hypoglycemia Symptoms  Hypoglycemia: Sweats (blurry vision)    Hypoglycemia Complications  Hypoglycemia Complications: None    Reducing Risks:  Reducing Risks Assessed Today: Yes  Diabetes Risks: Age over 45 years, Sedentary Lifestyle, Hyperlipidemia, Family History  CAD Risks: Diabetes Mellitus, Dyslipidemia, Hypertension, Post-menopausal, Sedentary lifestyle  Has dilated eye exam at least once a year?: Yes  Sees dentist every 6 months?: Yes  Feet checked by healthcare provider in the last year?: Yes    Healthy Coping:  Healthy Coping Assessed Today: Yes  Emotional response to diabetes: Ready to learn  Informal Support system:: Spouse  Stage of change: ACTION (Actively working towards change)  Support resources: None  Patient Activation Measure Survey Score:       No data to display                  Care Plan and Education Provided:  Care Plan: Diabetes   Updates made by Lety Merritt RD since 8/6/2024 12:00 AM        Problem: HbA1C Not In Goal         Goal:  Establish Regular Follow-Ups with PCP         Task: Discuss with PCP the recommended timing for patient's next follow up visit(s)    Responsible User: Lety Merritt RD        Task: Discuss schedule for PCP visits with patient Completed 8/6/2024   Responsible User: Lety Merritt RD        Goal: Get HbA1C Level in Goal         Task: Educate patient on diabetes education self-management topics    Responsible User: Lety Merritt RD        Task: Educate patient on benefits of regular glucose monitoring    Responsible User: Lety Merritt RD        Task: Refer patient to appropriate extended care team member, as needed (Medication Therapy Management, Behavioral Health, Physical Therapy, etc.)    Responsible User: Lety Merritt RD        Task: Discuss diabetes treatment plan with patient    Responsible User: Lety Merritt RD        Problem: Diabetes Self-Management Education Needed to Optimize Self-Care Behaviors         Goal: Understand diabetes pathophysiology and disease progression         Task: Provide education on diabetes pathophysiology and disease progression specfic to patient's diabetes type    Responsible User: Lety Merritt RD        Goal: Healthy Eating - follow a healthy eating pattern for diabetes         Task: Provide education on portion control and consistency in amount, composition and timing of food intake    Responsible User: Lety Merritt RD        Task: Provide education on managing carbohydrate intake (carbohydrate counting, plate planning method, etc.)    Responsible User: Lety Merritt RD        Task: Provide education on weight management    Responsible User: Lety Merritt RD        Task: Provide education on heart healthy eating    Responsible User: Lety Merritt RD        Task: Provide education on eating out    Responsible User: Lety Merritt RD        Task: Develop individualized healthy eating plan with patient Completed 8/6/2024   Responsible User:  Lety Merritt RD        Goal: Being Active - get regular physical activity, working up to at least 150 minutes per week    This Visit's Progress: 0%        Task: Provide education on relationship of activity to glucose and precautions to take if at risk for low glucose    Responsible User: Lety Merritt RD        Task: Discuss barriers to physical activity with patient    Responsible User: Lety Merritt RD        Task: Develop physical activity plan with patient Completed 8/6/2024   Responsible User: Lety Merritt RD        Task: Explore community resources including walking groups, assistance programs, and home videos    Responsible User: Lety Merritt RD        Goal: Monitoring - monitor glucose and ketones as directed    This Visit's Progress: 100%        Task: Provide education on blood glucose monitoring (purpose, proper technique, frequency, glucose targets, interpreting results, when to use glucose control solution, sharps disposal)    Responsible User: Lety Merritt RD        Task: Provide education on continuous glucose monitoring (sensor placement, use of valdo or /reader, understanding glucose trends, alerts and alarms, differences between sensor glucose and blood glucose)    Responsible User: Lety Merritt RD        Task: Provide education on ketone monitoring (when to monitor, frequency, etc.)    Responsible User: Lety Merritt RD        Goal: Taking Medication - patient is consistently taking medications as directed         Task: Provide education on action of prescribed medication, including when to take and possible side effects    Responsible User: Lety Merritt RD        Task: Provide education on insulin and injectable diabetes medications, including administration, storage, site selection and rotation for injection sites Completed 8/6/2024   Responsible User: Lety Merritt RD        Task: Discuss barriers to medication adherence with patient and provide  management technique ideas as appropriate    Responsible User: Lety Merritt RD        Task: Provide education on frequency and refill details of medications    Responsible User: Lety Merritt RD        Goal: Problem Solving - know how to prevent and manage short-term diabetes complications         Task: Provide education on high blood glucose - causes, signs/symptoms, prevention and treatment Completed 8/6/2024   Responsible User: Lety Merritt RD        Task: Provide education on low blood glucose - causes, signs/symptoms, prevention, treatment, carrying a carbohydrate source at all times, and medical identification Completed 8/6/2024   Responsible User: Lety Merritt RD        Task: Provide education on safe travel with diabetes    Responsible User: Lety Merritt RD        Task: Provide education on how to care for diabetes on sick days    Responsible User: Lety Merritt RD        Task: Provide education on when to call a health care provider    Responsible User: Lety Merritt RD        Goal: Reducing Risks - know how to prevent and treat long-term diabetes complications         Task: Provide education on major complications of diabetes, prevention, early diagnostic measures and treatment of complications    Responsible User: Lety Merritt RD        Task: Provide education on recommended care for dental, eye and foot health    Responsible User: Lety Merritt RD        Task: Provide education on Hemoglobin A1c - goals and relationship to blood glucose levels Completed 8/6/2024   Responsible User: Lety Merritt RD        Task: Provide education on recommendations for heart health - lipid levels and goals, blood pressure and goals, and aspirin therapy, if indicated    Responsible User: Lety Merritt RD        Task: Provide education on tobacco cessation    Responsible User: Lety Merritt RD        Goal: Healthy Coping - use available resources to cope with the challenges of  managing diabetes         Task: Discuss recognizing feelings about having diabetes    Responsible User: Lety Merritt RD        Task: Provide education on the benefits of making appropriate lifestyle changes    Responsible User: Lety Merritt RD        Task: Provide education on benefits of utilizing support systems    Responsible User: Lety Merritt RD        Task: Discuss methods for coping with stress    Responsible User: Lety Merritt RD        Task: Provide education on when to seek professional counseling    Responsible User: Lety Merritt RD Joy Smetanka RD, LD, [facility]   Certified Diabetes Care and   [facility] Tuesdays and Thursdays  [facility] Wednesdays-virtual visits only    Time Spent: 60 minutes  Encounter Type: Individual    Any diabetes medication dose changes were made via the CDE Protocol per the patient's primary care provider. A copy of this encounter was shared with the provider.

## 2024-08-08 RX ORDER — FLASH GLUCOSE SENSOR
KIT MISCELLANEOUS
Qty: 6 EACH | Refills: 1 | Status: SHIPPED | OUTPATIENT
Start: 2024-08-08

## 2024-11-15 ENCOUNTER — OFFICE VISIT (OUTPATIENT)
Dept: ENDOCRINOLOGY | Facility: CLINIC | Age: 69
End: 2024-11-15
Payer: MEDICARE

## 2024-11-15 VITALS
WEIGHT: 135.9 LBS | BODY MASS INDEX: 24.46 KG/M2 | HEART RATE: 85 BPM | DIASTOLIC BLOOD PRESSURE: 82 MMHG | OXYGEN SATURATION: 97 % | SYSTOLIC BLOOD PRESSURE: 158 MMHG

## 2024-11-15 DIAGNOSIS — E11.42 TYPE 2 DIABETES MELLITUS WITH DIABETIC POLYNEUROPATHY, WITH LONG-TERM CURRENT USE OF INSULIN (H): ICD-10-CM

## 2024-11-15 DIAGNOSIS — Z79.4 TYPE 2 DIABETES MELLITUS WITH DIABETIC POLYNEUROPATHY, WITH LONG-TERM CURRENT USE OF INSULIN (H): ICD-10-CM

## 2024-11-15 PROCEDURE — 99204 OFFICE O/P NEW MOD 45 MIN: CPT | Performed by: NURSE PRACTITIONER

## 2024-11-15 NOTE — PROGRESS NOTES
"[facility] ENDOCRINOLOGY    Diabetes Note 11/15/2024    Mishel Garza, 1955, [id number]          Reason for visit      1. Type 2 diabetes mellitus with diabetic polyneuropathy, with long-term current use of insulin (H)        HPI     Mishel Garza is a very pleasant 69 year old old female who presents for follow up.  SUMMARY:      Mishel is here today in referral for DM 2 management. Her most recent A1c was done in July and was >15.     She has a sister that converted to Type 1 DM post what was likely GDM. Pt reports that both of her babies were \"very chubby\". Her sister is on a pump. Her father was diagnosed when he turned about 50. He lived into his 90s and was a Type 2 to her knowledge.     Mishel believes that she has had Diabetes for \"about 15 years\". She notes that she had some weight loss just prior to dx, but her weight has been stable since then. There is some question as to whether she has Type 1 or Type 2.     She has seen several CDE, the most recent was in August.     She reports that she \"knows what she is supposed to do, but just lacks the motivation to do so\". She states that she \"eats whatever she wants, sometimes\". She feels that because she doesn't drink or smoke, that she can eat whatever because it's her only vice. She does endorse several Diet Cokes a day.     She has not been testing her BG. She was using the Abran Freestyle 14 day CGM for a while, and then couldn't get the sensors. She has just gotten some, however, and will be putting one on today when she gets home. She did not want to spend the money to get an \"upgrade\" because it would require a new [place]. She reports that \"I can afford it, it's just that Medicare is stupid\". She hasn't been using her Glucometer because she can only use one test strip a day per Medicare. They will support 3 a day if pt is on prandial insulin.     She is taking Invokana, 100 mg daily, Metformin, 1000 mg BID, and Tresiba, 8 units daily. " "    She denies ever having low BG.     She is having no problems referable to her feet. She is being told that there are \"a couple of spots\" that are being watched by her Eye Care team. She has been told to \"keep her BG down\".       Blood glucose data:      Past Medical History     Patient Active Problem List   Diagnosis    Hypercholesterolemia    Factor V Leiden Mutation    Diabetic polyneuropathy associated with type 2 diabetes mellitus (H)    Pulmonary hypertension, mild (H)    Tricuspid valve regurgitation, nonrheumatic    Osteopenia, unspecified location    Hypokalemia        Family History       family history includes Factor V Leiden deficiency in her mother.    Social History      reports that she has never smoked. She has never used smokeless tobacco. She reports that she does not drink alcohol and does not use drugs.      Review of Systems     Patient has no polyuria or polydipsia, no chest pain, dyspnea or TIA's, no numbness, tingling or pain in extremities  Remainder negative except as noted in HPI.    Vital Signs     BP (!) 158/82 (BP Location: Left arm, Patient Position: Sitting, Cuff Size: Adult Regular)   Pulse 85   Wt 61.6 kg (135 lb 14.4 oz)   LMP  (LMP Unknown)   SpO2 97%   BMI 24.46 kg/m    Wt Readings from Last 3 Encounters:   11/15/24 61.6 kg (135 lb 14.4 oz)   07/24/24 60.3 kg (133 lb)   04/24/24 59.4 kg (131 lb)       Physical Exam     Constitutional:  Well developed, Well nourished  HENT:  Normocephalic,   Neck: normal in appearance  Eyes:  PERRL, Conjunctiva pink  Respiratory:  No respiratory distress  Skin: No acanthosis nigricans, lipoatrophy or lipodystrophy  Neurologic:  Alert & oriented x 3, nonfocal  Psychiatric:  Affect, Mood, Insight appropriate        Assessment     1. Type 2 diabetes mellitus with diabetic polyneuropathy, with long-term current use of insulin (H)        Plan     I hear some \"it's not fair that I have to do this\" in some of our conversation today. She knows " "very well what she SHOULD be doing, but \"lacks the motivation\" to do it.     I do think its reasonable to get some testing done regarding pancreatic function and will put in labs. She is seeing her PCP next week and will do some fasting labs at that time for her.     I did increase her Tresiba dose because 8 units is not doing much for her. Increased to 20 units.     We talked about what is going on clinically to her Retinas, and potentially to her Kidneys and feet, although there is no current evidence of the latter.     She reports that she will no longer be able to get Invokana after the first of the year. Jardiance would be a good alternative if it is covered.     We discussed follow-up with me, and she wasn't terribly motivated. I am happy to support from the sidelines.     Time spent in chart review, medical decision making and face to face, 45 min        Ashley Kramer NP  [facility]  11/15/2024  9:09 AM      Lab Results     Microalbumin Urine mg/dL   Date Value Ref Range Status   01/19/2022 1.45 0.00 - 1.99 mg/dL Final       Cholesterol   Date Value Ref Range Status   04/24/2024 173 <200 mg/dL Final     Direct Measure HDL   Date Value Ref Range Status   04/24/2024 68 >=50 mg/dL Final     Triglycerides   Date Value Ref Range Status   04/24/2024 115 <150 mg/dL Final       [unfilled]      Current Medications     Outpatient Medications Prior to Visit   Medication Sig Dispense Refill    aspirin 325 MG tablet [ASPIRIN 325 MG TABLET] Take 325 mg by mouth daily.      atorvastatin (LIPITOR) 10 MG tablet Take 1 tablet (10 mg) by mouth daily 90 tablet 4    blood glucose (ONETOUCH VERIO IQ) test strip USE TO TEST BLOOD SUGAR 4 TIMES A DAY AS NEEDED 400 strip 11    canagliflozin (INVOKANA) 100 MG tablet Take 1 tablet (100 mg) by mouth every morning (before breakfast) 90 tablet 4    Continuous Glucose Sensor (FREESTYLE ELLYN 14 DAY SENSOR) MISC Change every 14 days. 6 each 1    Continuous Glucose Sensor (FREESTYLE " ELLYN 14 DAY SENSOR) MISC Change every 14 days. 2 each 5    generic lancets [GENERIC LANCETS] Accu-Chek Multiclix Lancets Miscellaneous  Test BG 4x daily 100 each 11    Glucose Blood (BLOOD GLUCOSE TEST STRIPS) STRP Use to test blood sugars once daily. Dispense brand per patient's insurance at pharmacy discretion. 100 strip 11    insulin pen needle (RELION PEN NEEDLES) 32G X 4 MM miscellaneous USE ONCE DAILY TO INJECT TRESIBA 90 each 2    lancets (ONETOUCH DELICA LANCETS) 33 gauge Misc [LANCETS (ONETOUCH DELICA LANCETS) 33 GAUGE MISC] 1 each by In Vitro route 4 (four) times a day. 100 each 3    metFORMIN (GLUCOPHAGE) 500 MG tablet TAKE 2 TABLETS BY MOUTH TWICE DAILY WITH FOOD 360 tablet 3    multivitamin (MULTIPLE VITAMINS ORAL) [MULTIVITAMIN (MULTIPLE VITAMINS ORAL)] Take by mouth. Centrum silver      potassium chloride nichole ER (KLOR-CON M20) 20 MEQ CR tablet Take 1 tablet (20 mEq) by mouth 2 times daily 180 tablet 4    TRESIBA FLEXTOUCH 100 UNIT/ML pen Take 8 units daily + use 2 unit prime with each dose and titration for a total of 15 units/day 9 mL 4     No facility-administered medications prior to visit.

## 2024-11-15 NOTE — LETTER
"11/15/2024      Mishel Garza  [address] [place] [address]      Dear Colleague,    Thank you for referring your patient, Mishel Garza, to the [facility] [place]. Please see a copy of my visit note below.    [facility] ENDOCRINOLOGY    Diabetes Note 11/15/2024    Mishel Garza, 1955, [id number]          Reason for visit      1. Type 2 diabetes mellitus with diabetic polyneuropathy, with long-term current use of insulin (H)        HPI     Mishel Garza is a very pleasant 69 year old old female who presents for follow up.  SUMMARY:      Mishel is here today in referral for DM 2 management. Her most recent A1c was done in July and was >15.     She has a sister that converted to Type 1 DM post what was likely GDM. Pt reports that both of her babies were \"very chubby\". Her sister is on a pump. Her father was diagnosed when he turned about 50. He lived into his 90s and was a Type 2 to her knowledge.     Mishel believes that she has had Diabetes for \"about 15 years\". She notes that she had some weight loss just prior to dx, but her weight has been stable since then. There is some question as to whether she has Type 1 or Type 2.     She has seen several CDE, the most recent was in August.     She reports that she \"knows what she is supposed to do, but just lacks the motivation to do so\". She states that she \"eats whatever she wants, sometimes\". She feels that because she doesn't drink or smoke, that she can eat whatever because it's her only vice. She does endorse several Diet Cokes a day.     She has not been testing her BG. She was using the Abran Freestyle 14 day CGM for a while, and then couldn't get the sensors. She has just gotten some, however, and will be putting one on today when she gets home. She did not want to spend the money to get an \"upgrade\" because it would require a new [place]. She reports that \"I can afford it, it's just that Medicare is " "stupid\". She hasn't been using her Glucometer because she can only use one test strip a day per Medicare. They will support 3 a day if pt is on prandial insulin.     She is taking Invokana, 100 mg daily, Metformin, 1000 mg BID, and Tresiba, 8 units daily.     She denies ever having low BG.     She is having no problems referable to her feet. She is being told that there are \"a couple of spots\" that are being watched by her Eye Care team. She has been told to \"keep her BG down\".       Blood glucose data:      Past Medical History     Patient Active Problem List   Diagnosis     Hypercholesterolemia     Factor V Leiden Mutation     Diabetic polyneuropathy associated with type 2 diabetes mellitus (H)     Pulmonary hypertension, mild (H)     Tricuspid valve regurgitation, nonrheumatic     Osteopenia, unspecified location     Hypokalemia        Family History       family history includes Factor V Leiden deficiency in her mother.    Social History      reports that she has never smoked. She has never used smokeless tobacco. She reports that she does not drink alcohol and does not use drugs.      Review of Systems     Patient has no polyuria or polydipsia, no chest pain, dyspnea or TIA's, no numbness, tingling or pain in extremities  Remainder negative except as noted in HPI.    Vital Signs     BP (!) 158/82 (BP Location: Left arm, Patient Position: Sitting, Cuff Size: Adult Regular)   Pulse 85   Wt 61.6 kg (135 lb 14.4 oz)   LMP  (LMP Unknown)   SpO2 97%   BMI 24.46 kg/m    Wt Readings from Last 3 Encounters:   11/15/24 61.6 kg (135 lb 14.4 oz)   07/24/24 60.3 kg (133 lb)   04/24/24 59.4 kg (131 lb)       Physical Exam     Constitutional:  Well developed, Well nourished  HENT:  Normocephalic,   Neck: normal in appearance  Eyes:  PERRL, Conjunctiva pink  Respiratory:  No respiratory distress  Skin: No acanthosis nigricans, lipoatrophy or lipodystrophy  Neurologic:  Alert & oriented x 3, nonfocal  Psychiatric:  Affect, " "Mood, Insight appropriate        Assessment     1. Type 2 diabetes mellitus with diabetic polyneuropathy, with long-term current use of insulin (H)        Plan     I hear some \"it's not fair that I have to do this\" in some of our conversation today. She knows very well what she SHOULD be doing, but \"lacks the motivation\" to do it.     I do think its reasonable to get some testing done regarding pancreatic function and will put in labs. She is seeing her PCP next week and will do some fasting labs at that time for her.     I did increase her Tresiba dose because 8 units is not doing much for her. Increased to 20 units.     We talked about what is going on clinically to her Retinas, and potentially to her Kidneys and feet, although there is no current evidence of the latter.     She reports that she will no longer be able to get Invokana after the first of the year. Jardiance would be a good alternative if it is covered.     We discussed follow-up with me, and she wasn't terribly motivated. I am happy to support from the sidelines.     Time spent in chart review, medical decision making and face to face, 45 min        Ashley Kramer NP  [facility]  11/15/2024  9:09 AM      Lab Results     Microalbumin Urine mg/dL   Date Value Ref Range Status   01/19/2022 1.45 0.00 - 1.99 mg/dL Final       Cholesterol   Date Value Ref Range Status   04/24/2024 173 <200 mg/dL Final     Direct Measure HDL   Date Value Ref Range Status   04/24/2024 68 >=50 mg/dL Final     Triglycerides   Date Value Ref Range Status   04/24/2024 115 <150 mg/dL Final       [unfilled]      Current Medications     Outpatient Medications Prior to Visit   Medication Sig Dispense Refill     aspirin 325 MG tablet [ASPIRIN 325 MG TABLET] Take 325 mg by mouth daily.       atorvastatin (LIPITOR) 10 MG tablet Take 1 tablet (10 mg) by mouth daily 90 tablet 4     blood glucose (ONETOUCH VERIO IQ) test strip USE TO TEST BLOOD SUGAR 4 TIMES A DAY AS NEEDED 400 " strip 11     canagliflozin (INVOKANA) 100 MG tablet Take 1 tablet (100 mg) by mouth every morning (before breakfast) 90 tablet 4     Continuous Glucose Sensor (FREESTYLE ELLYN 14 DAY SENSOR) MISC Change every 14 days. 6 each 1     Continuous Glucose Sensor (FREESTYLE ELLYN 14 DAY SENSOR) MISC Change every 14 days. 2 each 5     generic lancets [GENERIC LANCETS] Accu-Chek Multiclix Lancets Miscellaneous  Test BG 4x daily 100 each 11     Glucose Blood (BLOOD GLUCOSE TEST STRIPS) STRP Use to test blood sugars once daily. Dispense brand per patient's insurance at pharmacy discretion. 100 strip 11     insulin pen needle (RELION PEN NEEDLES) 32G X 4 MM miscellaneous USE ONCE DAILY TO INJECT TRESIBA 90 each 2     lancets (ONETOUCH DELICA LANCETS) 33 gauge Misc [LANCETS (ONETOUCH DELICA LANCETS) 33 GAUGE MISC] 1 each by In Vitro route 4 (four) times a day. 100 each 3     metFORMIN (GLUCOPHAGE) 500 MG tablet TAKE 2 TABLETS BY MOUTH TWICE DAILY WITH FOOD 360 tablet 3     multivitamin (MULTIPLE VITAMINS ORAL) [MULTIVITAMIN (MULTIPLE VITAMINS ORAL)] Take by mouth. Centrum silver       potassium chloride nichole ER (KLOR-CON M20) 20 MEQ CR tablet Take 1 tablet (20 mEq) by mouth 2 times daily 180 tablet 4     TRESIBA FLEXTOUCH 100 UNIT/ML pen Take 8 units daily + use 2 unit prime with each dose and titration for a total of 15 units/day 9 mL 4     No facility-administered medications prior to visit.           Again, thank you for allowing me to participate in the care of your patient.        Sincerely,        Ashley Kramer NP

## 2024-11-20 SDOH — HEALTH STABILITY: PHYSICAL HEALTH: ON AVERAGE, HOW MANY MINUTES DO YOU ENGAGE IN EXERCISE AT THIS LEVEL?: 20 MIN

## 2024-11-20 SDOH — HEALTH STABILITY: PHYSICAL HEALTH: ON AVERAGE, HOW MANY DAYS PER WEEK DO YOU ENGAGE IN MODERATE TO STRENUOUS EXERCISE (LIKE A BRISK WALK)?: 1 DAY

## 2024-11-20 ASSESSMENT — SOCIAL DETERMINANTS OF HEALTH (SDOH): HOW OFTEN DO YOU GET TOGETHER WITH FRIENDS OR RELATIVES?: ONCE A WEEK

## 2024-11-21 ENCOUNTER — OFFICE VISIT (OUTPATIENT)
Dept: FAMILY MEDICINE | Facility: CLINIC | Age: 69
End: 2024-11-21
Payer: MEDICARE

## 2024-11-21 VITALS
OXYGEN SATURATION: 93 % | HEIGHT: 63 IN | WEIGHT: 137 LBS | DIASTOLIC BLOOD PRESSURE: 64 MMHG | BODY MASS INDEX: 24.27 KG/M2 | TEMPERATURE: 98.1 F | HEART RATE: 92 BPM | RESPIRATION RATE: 18 BRPM | SYSTOLIC BLOOD PRESSURE: 122 MMHG

## 2024-11-21 DIAGNOSIS — E11.42 TYPE 2 DIABETES MELLITUS WITH DIABETIC POLYNEUROPATHY, WITH LONG-TERM CURRENT USE OF INSULIN (H): ICD-10-CM

## 2024-11-21 DIAGNOSIS — D68.59 PRIMARY HYPERCOAGULABLE STATE (H): ICD-10-CM

## 2024-11-21 DIAGNOSIS — E11.42 DIABETIC POLYNEUROPATHY ASSOCIATED WITH TYPE 2 DIABETES MELLITUS (H): ICD-10-CM

## 2024-11-21 DIAGNOSIS — M85.80 OSTEOPENIA, UNSPECIFIED LOCATION: ICD-10-CM

## 2024-11-21 DIAGNOSIS — E78.00 PURE HYPERCHOLESTEROLEMIA: ICD-10-CM

## 2024-11-21 DIAGNOSIS — R73.9 HYPERGLYCEMIA: ICD-10-CM

## 2024-11-21 DIAGNOSIS — R60.9 EDEMA, UNSPECIFIED TYPE: ICD-10-CM

## 2024-11-21 DIAGNOSIS — Z79.4 TYPE 2 DIABETES MELLITUS WITH DIABETIC POLYNEUROPATHY, WITH LONG-TERM CURRENT USE OF INSULIN (H): ICD-10-CM

## 2024-11-21 DIAGNOSIS — Z00.00 MEDICARE ANNUAL WELLNESS VISIT, SUBSEQUENT: Primary | ICD-10-CM

## 2024-11-21 DIAGNOSIS — E87.6 HYPOKALEMIA: ICD-10-CM

## 2024-11-21 DIAGNOSIS — I27.20 PULMONARY HYPERTENSION, MILD (H): ICD-10-CM

## 2024-11-21 LAB
ALBUMIN SERPL BCG-MCNC: 4.5 G/DL (ref 3.5–5.2)
ALP SERPL-CCNC: 88 U/L (ref 40–150)
ALT SERPL W P-5'-P-CCNC: 24 U/L (ref 0–50)
ANION GAP SERPL CALCULATED.3IONS-SCNC: 10 MMOL/L (ref 7–15)
AST SERPL W P-5'-P-CCNC: 23 U/L (ref 0–45)
BILIRUB SERPL-MCNC: 0.2 MG/DL
BUN SERPL-MCNC: 21.3 MG/DL (ref 8–23)
CALCIUM SERPL-MCNC: 10.4 MG/DL (ref 8.8–10.4)
CHLORIDE SERPL-SCNC: 100 MMOL/L (ref 98–107)
CHOLEST SERPL-MCNC: 170 MG/DL
CREAT SERPL-MCNC: 0.49 MG/DL (ref 0.51–0.95)
EGFRCR SERPLBLD CKD-EPI 2021: >90 ML/MIN/1.73M2
EST. AVERAGE GLUCOSE BLD GHB EST-MCNC: 381 MG/DL
GLUCOSE SERPL-MCNC: 70 MG/DL (ref 70–99)
HBA1C MFR BLD: 14.9 % (ref 0–5.6)
HCO3 SERPL-SCNC: 30 MMOL/L (ref 22–29)
HDLC SERPL-MCNC: 87 MG/DL
HOLD SPECIMEN: NORMAL
HOLD SPECIMEN: NORMAL
LDLC SERPL CALC-MCNC: 72 MG/DL
NONHDLC SERPL-MCNC: 83 MG/DL
POTASSIUM SERPL-SCNC: 4.3 MMOL/L (ref 3.4–5.3)
PROT SERPL-MCNC: 7 G/DL (ref 6.4–8.3)
SODIUM SERPL-SCNC: 140 MMOL/L (ref 135–145)
TRIGL SERPL-MCNC: 53 MG/DL
VIT D+METAB SERPL-MCNC: 58 NG/ML (ref 20–50)

## 2024-11-21 RX ORDER — ATORVASTATIN CALCIUM 10 MG/1
10 TABLET, FILM COATED ORAL DAILY
Qty: 90 TABLET | Refills: 4 | Status: SHIPPED | OUTPATIENT
Start: 2024-11-21

## 2024-11-21 RX ORDER — BLOOD SUGAR DIAGNOSTIC
STRIP MISCELLANEOUS
Qty: 400 STRIP | Refills: 11 | Status: SHIPPED | OUTPATIENT
Start: 2024-11-21

## 2024-11-21 RX ORDER — GLUCOSAMINE HCL/CHONDROITIN SU 500-400 MG
CAPSULE ORAL
Qty: 100 STRIP | Refills: 11 | Status: SHIPPED | OUTPATIENT
Start: 2024-11-21

## 2024-11-21 RX ORDER — FLASH GLUCOSE SENSOR
KIT MISCELLANEOUS
Qty: 2 EACH | Refills: 5 | Status: SHIPPED | OUTPATIENT
Start: 2024-11-21

## 2024-11-21 RX ORDER — PEN NEEDLE, DIABETIC 32GX 5/32"
NEEDLE, DISPOSABLE MISCELLANEOUS
Qty: 90 EACH | Refills: 4 | Status: SHIPPED | OUTPATIENT
Start: 2024-11-21

## 2024-11-21 RX ORDER — INSULIN DEGLUDEC 100 U/ML
INJECTION, SOLUTION SUBCUTANEOUS
Qty: 9 ML | Refills: 4 | Status: SHIPPED | OUTPATIENT
Start: 2024-11-21

## 2024-11-21 RX ORDER — FLASH GLUCOSE SENSOR
KIT MISCELLANEOUS
Qty: 6 EACH | Refills: 4 | Status: SHIPPED | OUTPATIENT
Start: 2024-11-21

## 2024-11-21 RX ORDER — POTASSIUM CHLORIDE 1500 MG/1
20 TABLET, EXTENDED RELEASE ORAL 2 TIMES DAILY
Qty: 180 TABLET | Refills: 4 | Status: SHIPPED | OUTPATIENT
Start: 2024-11-21

## 2024-11-21 ASSESSMENT — PAIN SCALES - GENERAL: PAINLEVEL_OUTOF10: NO PAIN (0)

## 2024-11-21 NOTE — PATIENT INSTRUCTIONS
Reduce Tresiba to 18 units daily.  If you are having low sugars, reduce your Tresiba by 2 units.  Our goal morning fasting blood sugar is .  If your sugars begin running higher, you can increase your Tresiba by 1 to 2 units no more frequently than every 3 days.  Once your morning sugars are stable at goal range, I would like you to reach out to me or to Lety (diabetes educator) to review your daytime sugars to determine if additional adjustments need to be made.    Let me know when you are ready to proceed with colonoscopy.  Patient Education   Preventive Care Advice   This is general advice given by our system to help you stay healthy. However, your care team may have specific advice just for you. Please talk to your care team about your preventive care needs.  Nutrition  Eat 5 or more servings of fruits and vegetables each day.  Try wheat bread, brown rice and whole grain pasta (instead of white bread, rice, and pasta).  Get enough calcium and vitamin D. Check the label on foods and aim for 100% of the RDA (recommended daily allowance).  Lifestyle  Exercise at least 150 minutes each week  (30 minutes a day, 5 days a week).  Do muscle strengthening activities 2 days a week. These help control your weight and prevent disease.  No smoking.  Wear sunscreen to prevent skin cancer.  Have a dental exam and cleaning every 6 months.  Yearly exams  See your health care team every year to talk about:  Any changes in your health.  Any medicines your care team has prescribed.  Preventive care, family planning, and ways to prevent chronic diseases.  Shots (vaccines)   HPV shots (up to age 26), if you've never had them before.  Hepatitis B shots (up to age 59), if you've never had them before.  COVID-19 shot: Get this shot when it's due.  Flu shot: Get a flu shot every year.  Tetanus shot: Get a tetanus shot every 10 years.  Pneumococcal, hepatitis A, and RSV shots: Ask your care team if you need these based on your  risk.  Shingles shot (for age 50 and up)  General health tests  Diabetes screening:  Starting at age 35, Get screened for diabetes at least every 3 years.  If you are younger than age 35, ask your care team if you should be screened for diabetes.  Cholesterol test: At age 39, start having a cholesterol test every 5 years, or more often if advised.  Bone density scan (DEXA): At age 50, ask your care team if you should have this scan for osteoporosis (brittle bones).  Hepatitis C: Get tested at least once in your life.  STIs (sexually transmitted infections)  Before age 24: Ask your care team if you should be screened for STIs.  After age 24: Get screened for STIs if you're at risk. You are at risk for STIs (including HIV) if:  You are sexually active with more than one person.  You don't use condoms every time.  You or a partner was diagnosed with a sexually transmitted infection.  If you are at risk for HIV, ask about PrEP medicine to prevent HIV.  Get tested for HIV at least once in your life, whether you are at risk for HIV or not.  Cancer screening tests  Cervical cancer screening: If you have a cervix, begin getting regular cervical cancer screening tests starting at age 21.  Breast cancer scan (mammogram): If you've ever had breasts, begin having regular mammograms starting at age 40. This is a scan to check for breast cancer.  Colon cancer screening: It is important to start screening for colon cancer at age 45.  Have a colonoscopy test every 10 years (or more often if you're at risk) Or, ask your provider about stool tests like a FIT test every year or Cologuard test every 3 years.  To learn more about your testing options, visit:   .  For help making a decision, visit:   https://bit.ly/tf12455.  Prostate cancer screening test: If you have a prostate, ask your care team if a prostate cancer screening test (PSA) at age 55 is right for you.  Lung cancer screening: If you are a current or former smoker ages 50  to 80, ask your care team if ongoing lung cancer screenings are right for you.  For informational purposes only. Not to replace the advice of your health care provider. Copyright   2023 [facility]. All rights reserved. Clinically reviewed by the [facility]. [employer] 552421 - REV 01/24.  Preventing Falls: Care Instructions  Injuries and health problems such as trouble walking or poor eyesight can increase your risk of falling. So can some medicines. But there are things you can do to help prevent falls. You can exercise to get stronger. You can also arrange your home to make it safer.    Talk to your doctor about the medicines you take. Ask if any of them increase the risk of falls and whether they can be changed or stopped.   Try to exercise regularly. It can help improve your strength and balance. This can help lower your risk of falling.         Practice fall safety and prevention.   Wear low-heeled shoes that fit well and give your feet good support. Talk to your doctor if you have foot problems that make this hard.  Carry a cellphone or wear a medical alert device that you can use to call for help.  Use stepladders instead of chairs to reach high objects. Don't climb if you're at risk for falls. Ask for help, if needed.  Wear the correct eyeglasses, if you need them.        Make your home safer.   Remove rugs, cords, clutter, and furniture from walkways.  Keep your house well lit. Use night-lights in hallways and bathrooms.  Install and use sturdy handrails on stairways.  Wear nonskid footwear, even inside. Don't walk barefoot or in socks without shoes.        Be safe outside.   Use handrails, curb cuts, and ramps whenever possible.  Keep your hands free by using a shoulder bag or backpack.  Try to walk in well-lit areas. Watch out for uneven ground, changes in pavement, and debris.  Be careful in the winter. Walk on the grass or gravel when sidewalks are slippery. Use  "de-icer on steps and walkways. Add non-slip devices to shoes.    Put grab bars and nonskid mats in your shower or tub and near the toilet. Try to use a shower chair or bath bench when bathing.   Get into a tub or shower by putting in your weaker leg first. Get out with your strong side first. Have a phone or medical alert device in the bathroom with you.   Where can you learn more?  Go to https://www.LgDb.com.net/patiented  Enter G117 in the search box to learn more about \"Preventing Falls: Care Instructions.\"  Current as of: July 17, 2023  Content Version: 14.2 2024 [facility].   Care instructions adapted under license by your healthcare professional. If you have questions about a medical condition or this instruction, always ask your healthcare professional. [employer] disclaims any warranty or liability for your use of this information.       "

## 2024-11-21 NOTE — PROGRESS NOTES
Preventive Care Visit  [facility] [place]  hSante Cohen MD, Family Medicine  Nov 21, 2024      Assessment & Plan     Medicare annual wellness visit, subsequent  At today's visit, we discussed lifestyle interventions to promote self-management and wellness, including maintenance of a healthy weight, healthy diet, regular physical activity and exercise, and falls prevention.  Immunizations reviewed and up-to-date.  Mammogram screening up-to-date.  Bone density screening up-to-date.  Advised colon cancer screening, she declines as she is not willing to complete prep at this time, will continue to discuss at future visits, but she declines referral.    Type 2 diabetes mellitus with diabetic polyneuropathy, with long-term current use of insulin (H)  This remains inadequately controlled with severely elevated A1c at 14.9% though actually improved from last.  Encouraged efforts at healthy lifestyle habits.  Noting hypoglycemia with increase in Tresiba, will have her reduce from 20 units back down to 18 units.  Discussed titration parameters to achieve blood sugars 90 to 1:30 in the morning.  Once achieving this, discussed importance of assessing daytime sugars pre and post meals to determine whether additional treatment should be considered.  Today we will assess C-peptide level, glutamic acid antibody, and islet cell antibodies to assess for type 1 diabetes or latent autoimmune diabetes versus that type 2 diabetes we have presume she is experiencing.  This will determine next steps in treatment.  Invokana likely will need to be replaced due to insurance formulary changes anticipated, however will need to determine current diabetes stick to determine whether persistent use of SGLT2 inhibitor is appropriate.  Encouraged resumption of regular physical activity.  - Hemoglobin A1c; Standing  - Comprehensive metabolic panel; Future  - Lipid panel reflex to direct LDL Fasting; Future  - Hemoglobin A1c  -  Continuous Glucose Sensor (FREESTYLE ELLYN 14 DAY SENSOR) MISC; Change every 14 days.  - blood glucose (ONETOUCH VERIO IQ) test strip; USE TO TEST BLOOD SUGAR 4 TIMES A DAY AS NEEDED  - Glucose Blood (BLOOD GLUCOSE TEST STRIPS) STRP; Use to test blood sugars once daily. Dispense brand per patient's insurance at pharmacy discretion.  - insulin pen needle (RELION PEN NEEDLES) 32G X 4 MM miscellaneous; Use 1 pen needles daily or as directed.  - metFORMIN (GLUCOPHAGE) 500 MG tablet; TAKE 2 TABLETS BY MOUTH TWICE DAILY WITH FOOD  - canagliflozin (INVOKANA) 100 MG tablet; Take 1 tablet (100 mg) by mouth every morning (before breakfast).  - TRESIBA FLEXTOUCH 100 UNIT/ML pen; Take 18 units daily + use 2 unit prime with each dose and titration for a total of 20 units/day  - Continuous Glucose Sensor (FREESTYLE ELLYN 14 DAY SENSOR) MISC; Change every 14 days.  - Comprehensive metabolic panel  - Lipid panel reflex to direct LDL Fasting  - C-peptide; Future  - Glutamic acid decarboxylase antibody; Future  - Islet Antigen (IA-2) Autoantibody, Serum; Future  - C-peptide  - Glutamic acid decarboxylase antibody  - Islet Antigen (IA-2) Autoantibody, Serum    Hyperglycemia  Encouraged her to check her blood sugars 4 times daily due to significant hyperglycemia..    - blood glucose (ONETOUCH VERIO IQ) test strip; USE TO TEST BLOOD SUGAR 4 TIMES A DAY AS NEEDED    Diabetic polyneuropathy associated with type 2 diabetes mellitus (H)  Stable, adequate symptom management at this time.    Hypercholesterolemia  Encourage efforts at healthy lifestyle habits.  Continue atorvastatin.  We will check liver function and nonfasting lipids today.  - Comprehensive metabolic panel; Future  - atorvastatin (LIPITOR) 10 MG tablet; Take 1 tablet (10 mg) by mouth daily.  - Comprehensive metabolic panel    Pulmonary hypertension, mild (H)  Symptoms stable    Edema, unspecified type  Trace edema on exam, adequately managed.  Invokana likely  helping.    Factor V Leiden Mutation  No current or recent evidence of venous thromboembolism.    Hypokalemia  Refill provided of potassium chloride, will recheck potassium level today.  - potassium chloride nichole ER (KLOR-CON M20) 20 MEQ CR tablet; Take 1 tablet (20 mEq) by mouth 2 times daily.    Osteopenia, unspecified location  Encourage weightbearing activities, measures reduce risk of falls, adequate calcium and vitamin D intake.  We will check vitamin D level today.  - Vitamin D Deficiency; Future  - Vitamin D Deficiency            Counseling  Appropriate preventive services were addressed with this patient via screening, questionnaire, or discussion as appropriate for fall prevention, nutrition, physical activity, Tobacco-use cessation, social engagement, weight loss and cognition.  Checklist reviewing preventive services available has been given to the patient.  Reviewed patient's diet, addressing concerns and/or questions.   She is at risk for lack of exercise and has been provided with information to increase physical activity for the benefit of her well-being.           Letitia Florentino is a 69 year old, presenting for the following:  Wellness Visit (Not fasting, had low sugar this morning. Orders for fasting test to see if type 1 or type 2)            HPI  Seen today for annual wellness visit and for follow-up of diabetes.  She is not fasting as her blood sugar was low at 66 this morning.  Recent visit with endocrinology, advised to increase her Tresiba from 8 units to 20 units, blood sugars have steadily began to improve, most recently morning sugars in the 90s, this morning was 66.  She did not miss any meals.  She has been eating more sugar to prevent low sugars.  She becomes shaky and sweaty when her sugars are low.  Remains compliant with metformin and Invokana.  She has not been exercising regularly.  Remains on atorvastatin and management of dyslipidemia.  Edema has been stable, prior history of  pulmonary hypertension thought to be contributing.  Breathing has been stable as well.  History of osteopenia, due for follow-up vitamin D level, last bone density scan April 2023.  She is not interested in completing the prep for colonoscopy as her last prep required a 2-day process that was distressing for her, history of polyps and therefore requires colonoscopy as opposed alternatives.        Health Care Directive  Patient does not have a Health Care Directive: Discussed advance care planning with patient; information given to patient to review.      11/20/2024   General Health   How would you rate your overall physical health? Good   Feel stress (tense, anxious, or unable to sleep) Not at all            11/20/2024   Nutrition   Diet: Regular (no restrictions)            11/20/2024   Exercise   Days per week of moderate/strenous exercise 1 day   Average minutes spent exercising at this level 20 min      (!) EXERCISE CONCERN      11/20/2024   Social Factors   Frequency of gathering with friends or relatives Once a week   Worry food won't last until get money to buy more No   Food not last or not have enough money for food? No   Do you have housing? (Housing is defined as stable permanent housing and does not include staying ouside in a car, in a tent, in an abandoned building, in an overnight shelter, or couch-surfing.) No   Are you worried about losing your housing? No   Lack of transportation? No   Unable to get utilities (heat,electricity)? No   Want help with housing or utility concern? No      (!) HOUSING CONCERN PRESENT      11/21/2024   Fall Risk   Gait Speed Test (Document in seconds) 4.3   Gait Speed Test Interpretation Less than or equal to 5.00 seconds - PASS             11/20/2024   Activities of Daily Living- Home Safety   Needs help with the following daily activites None of the above   Safety concerns in the home None of the above            11/20/2024   Dental   Dentist two times every year? (!)  DECLINE            11/20/2024   Hearing Screening   Hearing concerns? None of the above            11/20/2024   Driving Risk Screening   Patient/family members have concerns about driving No            11/20/2024   General Alertness/Fatigue Screening   Have you been more tired than usual lately? No            11/20/2024   Urinary Incontinence Screening   Bothered by leaking urine in past 6 months No            4/22/2024   TB Screening   Were you born outside of the [place]? No              Today's PHQ-2 Score:       7/23/2024     7:35 PM   PHQ-2 ( 1999 Pfizer)   Q1: Little interest or pleasure in doing things 0    Q2: Feeling down, depressed or hopeless 0    PHQ-2 Score 0   Q1: Little interest or pleasure in doing things Not at all   Q2: Feeling down, depressed or hopeless Not at all   PHQ-2 Score 0       Patient-reported         11/20/2024   Substance Use   Alcohol more than 3/day or more than 7/wk No   Do you have a current opioid prescription? No   How severe/bad is pain from 1 to 10? 0/10 (No Pain)   Do you use any other substances recreationally? No        Social History     Tobacco Use    Smoking status: Never    Smokeless tobacco: Never   Vaping Use    Vaping status: Never Used   Substance Use Topics    Alcohol use: No    Drug use: No           5/7/2024   LAST FHS-7 RESULTS   1st degree relative breast or ovarian cancer No   Any relative bilateral breast cancer No   Any male have breast cancer No   Any ONE woman have BOTH breast AND ovarian cancer No   Any woman with breast cancer before 50yrs No   2 or more relatives with breast AND/OR ovarian cancer No   2 or more relatives with breast AND/OR bowel cancer No           Mammogram Screening - Mammogram every 1-2 years updated in Health Maintenance based on mutual decision making      History of abnormal Pap smear: No - age 65 or older with adequate negative prior screening test results (3 consecutive negative cytology results, 2 consecutive negative cotesting  results, or 2 consecutive negative HrHPV test results within 10 years, with the most recent test occurring within the recommended screening interval for the test used)        2017     1:22 PM   PAP / HPV   PAP Negative for squamous intraepithelial lesion or malignancy  Electronically signed by Antonia Rendon CT (ASCP) on 12/15/2017 at  1:54 PM        ASCVD Risk   The 10-year ASCVD risk score (Stanislaw DK, et al., 2019) is: 13.2%    Values used to calculate the score:      Age: 69 years      Sex: Female      Is Non- : No      Diabetic: Yes      Tobacco smoker: No      Systolic Blood Pressure: 122 mmHg      Is BP treated: No      HDL Cholesterol: 68 mg/dL      Total Cholesterol: 173 mg/dL            Reviewed and updated as needed this visit by Provider                    Past Medical History:   Diagnosis Date    High cholesterol     Primary hypercoagulable state (H)     Created by [facility] Annotation: May 19 2008 10:15AM - Ciara Crouch:  heterozygous;     Pulmonary hypertension (H)     mild    Tricuspid valve regurgitation, nonrheumatic 3/3/2017    Likely secondary     Type 2 diabetes mellitus (H)     Created by Conversion      No past surgical history on file.  OB History    Para Term  AB Living   1 1 1 0 0 0   SAB IAB Ectopic Multiple Live Births   0 0 0 0 0      # Outcome Date GA Lbr Imtiaz/2nd Weight Sex Type Anes PTL Lv   1 Term              Lab work is in process  Labs reviewed in EPIC  BP Readings from Last 3 Encounters:   24 122/64   11/15/24 (!) 158/82   24 122/68    Wt Readings from Last 3 Encounters:   24 62.1 kg (137 lb)   11/15/24 61.6 kg (135 lb 14.4 oz)   24 60.3 kg (133 lb)                  Patient Active Problem List   Diagnosis    Hypercholesterolemia    Factor V Leiden Mutation    Diabetic polyneuropathy associated with type 2 diabetes mellitus (H)    Pulmonary hypertension, mild (H)    Tricuspid valve  regurgitation, nonrheumatic    Osteopenia, unspecified location    Hypokalemia     No past surgical history on file.    Social History     Tobacco Use    Smoking status: Never    Smokeless tobacco: Never   Substance Use Topics    Alcohol use: No     Family History   Problem Relation Age of Onset    Factor V Leiden deficiency Mother          Current Outpatient Medications   Medication Sig Dispense Refill    aspirin 325 MG tablet [ASPIRIN 325 MG TABLET] Take 325 mg by mouth daily.      atorvastatin (LIPITOR) 10 MG tablet Take 1 tablet (10 mg) by mouth daily. 90 tablet 4    blood glucose (ONETOUCH VERIO IQ) test strip USE TO TEST BLOOD SUGAR 4 TIMES A DAY AS NEEDED 400 strip 11    canagliflozin (INVOKANA) 100 MG tablet Take 1 tablet (100 mg) by mouth every morning (before breakfast). 90 tablet 4    Continuous Glucose Sensor (FREESTYLE ELLYN 14 DAY SENSOR) MISC Change every 14 days. 6 each 4    Continuous Glucose Sensor (FREESTYLE ELLYN 14 DAY SENSOR) MISC Change every 14 days. 2 each 5    generic lancets [GENERIC LANCETS] Accu-Chek Multiclix Lancets Miscellaneous  Test BG 4x daily 100 each 11    Glucose Blood (BLOOD GLUCOSE TEST STRIPS) STRP Use to test blood sugars once daily. Dispense brand per patient's insurance at pharmacy discretion. 100 strip 11    insulin pen needle (RELION PEN NEEDLES) 32G X 4 MM miscellaneous Use 1 pen needles daily or as directed. 90 each 4    lancets (ONETOUCH DELICA LANCETS) 33 gauge Misc [LANCETS (ONETOUCH DELICA LANCETS) 33 GAUGE MISC] 1 each by In Vitro route 4 (four) times a day. 100 each 3    metFORMIN (GLUCOPHAGE) 500 MG tablet TAKE 2 TABLETS BY MOUTH TWICE DAILY WITH FOOD 360 tablet 4    multivitamin (MULTIPLE VITAMINS ORAL) [MULTIVITAMIN (MULTIPLE VITAMINS ORAL)] Take by mouth. Centrum silver      potassium chloride nichole ER (KLOR-CON M20) 20 MEQ CR tablet Take 1 tablet (20 mEq) by mouth 2 times daily. 180 tablet 4    TRESIBA FLEXTOUCH 100 UNIT/ML pen Take 18 units daily + use 2  unit prime with each dose and titration for a total of 20 units/day 9 mL 4     No Known Allergies  Recent Labs   Lab Test 11/21/24  0753 07/24/24  0717 04/24/24  0855 04/24/24  0851 08/24/23  0810 03/29/23  0702 11/09/22  0741 07/07/21  0717 02/24/21  0730 11/18/20  0701 02/26/20  0843   A1C 14.9* >15.0* >15.0*  --  13.6*   < > 8.5*   < >  --    < >  --    LDL  --   --   --  82  --   --  68  --  61  --  81   HDL  --   --   --  68  --   --  80  --  65  --  66   TRIG  --   --   --  115  --   --  55  --  71  --  53   ALT  --  16  --  18 18  --  24  --  23  --  33   CR  --  0.48*  --  0.56 0.57  --  0.56  --  0.66  --  0.66   GFRESTIMATED  --  >90  --  >90 >90  --  >90   < > >60  --  >60   GFRESTBLACK  --   --   --   --   --   --   --   --  >60  --  >60   POTASSIUM  --  4.5  --  4.2 4.9  --  4.4   < > 4.2  --  4.9    < > = values in this interval not displayed.      Current providers sharing in care for this patient include:  Patient Care Team:  Shante Cohen MD as PCP - Elizabeth Caputo, PharmD as Pharmacist (Pharmacist)  Shante Cohen MD as Assigned PCP  Argentina Chavez RD as Registered Dietitian (Dietitian, Registered)  Ashley Kramer NP as Nurse Practitioner  Argentina Chavez RD as Registered Dietitian (Dietitian, Registered)    The following health maintenance items are reviewed in Epic and correct as of today:  Health Maintenance   Topic Date Due    COLORECTAL CANCER SCREENING  06/11/2022    DIABETIC FOOT EXAM  08/24/2024    DEXA  04/11/2025    EYE EXAM  04/18/2025    MEDICARE ANNUAL WELLNESS VISIT  04/24/2025    LIPID  04/24/2025    MICROALBUMIN  04/24/2025    A1C  05/21/2025    BMP  07/24/2025    ANNUAL REVIEW OF HM ORDERS  07/24/2025    FALL RISK ASSESSMENT  11/21/2025    MAMMO SCREENING  05/07/2026    DTAP/TDAP/TD IMMUNIZATION (3 - Td or Tdap) 02/25/2029    ADVANCE CARE PLANNING  04/24/2029    HEPATITIS C SCREENING  Completed    PHQ-2 (once per calendar year)  Completed    INFLUENZA VACCINE   "Completed    Pneumococcal Vaccine: 65+ Years  Completed    ZOSTER IMMUNIZATION  Completed    RSV VACCINE  Completed    COVID-19 Vaccine  Completed    HPV IMMUNIZATION  Aged Out    MENINGITIS IMMUNIZATION  Aged Out    RSV MONOCLONAL ANTIBODY  Aged Out         Review of Systems  Constitutional, neuro, ENT, endocrine, pulmonary, cardiac, gastrointestinal, genitourinary, musculoskeletal, integument and psychiatric systems are negative, except as otherwise noted.     Objective    Exam  /64   Pulse 92   Temp 98.1  F (36.7  C)   Resp 18   Ht 1.588 m (5' 2.5\")   Wt 62.1 kg (137 lb)   LMP  (LMP Unknown)   SpO2 93%   BMI 24.66 kg/m     Estimated body mass index is 24.66 kg/m  as calculated from the following:    Height as of this encounter: 1.588 m (5' 2.5\").    Weight as of this encounter: 62.1 kg (137 lb).    Physical Exam  Physical Examination: General appearance - alert, well appearing, and in no distress, oriented to person, place, and time and normal appearing weight  Mental status - alert, oriented to person, place, and time, normal mood, behavior, speech, dress, motor activity, and thought processes  Eyes - pupils equal and reactive, extraocular eye movements intact  Ears - bilateral TM's and external ear canals normal  Nose - normal and patent, no erythema, discharge or polyps  Mouth - mucous membranes moist, pharynx normal without lesions  Neck - supple, no significant adenopathy  Lymphatics - no palpable lymphadenopathy, no hepatosplenomegaly  Chest - clear to auscultation, no wheezes, rales or rhonchi, symmetric air entry  Heart - normal rate, regular rhythm, normal S1, S2, no murmurs, rubs, clicks or gallops  Abdomen - soft, nontender, nondistended, no masses or organomegaly  Breasts - breasts appear normal, no suspicious masses, no skin or nipple changes or axillary nodes  Neurological - alert, oriented, normal speech, no focal findings or movement disorder noted  Musculoskeletal - no joint " tenderness, deformity or swelling  Extremities - peripheral pulses normal, trace bilateral pedal edema, no clubbing or cyanosis  Skin - normal coloration and turgor, no rashes, no suspicious skin lesions noted    Diabetic foot exam: normal DP and PT pulses, no trophic changes or ulcerative lesions, and normal sensory exam         11/21/2024   Mini Cog   Clock Draw Score 2 Normal   3 Item Recall 3 objects recalled   Mini Cog Total Score 5                 Signed Electronically by: Shante Cohen MD

## 2024-11-24 LAB
GAD65 AB SER IA-ACNC: >250 IU/ML
ISLET CELL512 AB SER IA-ACNC: <5.4 U/ML

## 2024-11-27 DIAGNOSIS — E10.65 TYPE 1 DIABETES MELLITUS WITH HYPERGLYCEMIA (H): Primary | ICD-10-CM

## 2024-12-24 ENCOUNTER — TELEPHONE (OUTPATIENT)
Dept: FAMILY MEDICINE | Facility: CLINIC | Age: 69
End: 2024-12-24
Payer: MEDICARE

## 2024-12-24 DIAGNOSIS — E11.42 TYPE 2 DIABETES MELLITUS WITH DIABETIC POLYNEUROPATHY, WITH LONG-TERM CURRENT USE OF INSULIN (H): ICD-10-CM

## 2024-12-24 DIAGNOSIS — Z79.4 TYPE 2 DIABETES MELLITUS WITH DIABETIC POLYNEUROPATHY, WITH LONG-TERM CURRENT USE OF INSULIN (H): ICD-10-CM

## 2024-12-24 RX ORDER — FLASH GLUCOSE SENSOR
KIT MISCELLANEOUS
Qty: 2 EACH | Refills: 5 | Status: SHIPPED | OUTPATIENT
Start: 2024-12-24

## 2024-12-24 NOTE — TELEPHONE ENCOUNTER
This message is to inform you that we are in need of Medicare compliant prescriptions for Freestyle Abran 14-Day Sensors.     Prescription must be written by: Shante Cohen.  Must include full supply name: Freestyle Abran 14-Day Sensor  Quantity: 6  Refills: 1  SIG: Change every 14 days    As a reminder, Medicare requires patients to be seen every 3 months for insulin supplies and every 6 months for CGMs. The provider who sees the patient must be the provider on the prescription, must be written on the day of or after office visit date and office visit must include notes about diabetes and insulin regimen. The Diabetes Care Services team at [facility] may request new prescriptions before renewal dates of the prescription is filled over the allowable amount. Writing the order to match what is above will help ensure we will only need to request every 3 or 6 months.    Thank you!    [facility]  Diabetes Care Services Team  [address] [place] [address]  Provider Phone: [phone]  Patient Line: [phone]  Fax: [phone]  E-mail: [email or web address]

## 2025-01-07 ENCOUNTER — ALLIED HEALTH/NURSE VISIT (OUTPATIENT)
Dept: EDUCATION SERVICES | Facility: CLINIC | Age: 70
End: 2025-01-07
Payer: MEDICARE

## 2025-01-07 VITALS — BODY MASS INDEX: 26.26 KG/M2 | WEIGHT: 145.9 LBS

## 2025-01-07 DIAGNOSIS — Z79.4 TYPE 2 DIABETES MELLITUS WITH DIABETIC POLYNEUROPATHY, WITH LONG-TERM CURRENT USE OF INSULIN (H): ICD-10-CM

## 2025-01-07 DIAGNOSIS — E11.42 TYPE 2 DIABETES MELLITUS WITH DIABETIC POLYNEUROPATHY, WITH LONG-TERM CURRENT USE OF INSULIN (H): ICD-10-CM

## 2025-01-07 DIAGNOSIS — E16.2 HYPOGLYCEMIA: Primary | ICD-10-CM

## 2025-01-07 PROCEDURE — 99207 PR NO CHARGE NURSE ONLY: CPT

## 2025-01-07 RX ORDER — HYDROCHLOROTHIAZIDE 12.5 MG/1
CAPSULE ORAL
Qty: 6 EACH | Refills: 3 | Status: SHIPPED | OUTPATIENT
Start: 2025-01-07 | End: 2025-01-15

## 2025-01-07 RX ORDER — KETOROLAC TROMETHAMINE 30 MG/ML
1 INJECTION, SOLUTION INTRAMUSCULAR; INTRAVENOUS ONCE
Qty: 1 EACH | Refills: 0 | Status: SHIPPED | OUTPATIENT
Start: 2025-01-07 | End: 2025-01-07

## 2025-01-07 NOTE — LETTER
1/7/2025         RE: Mishel Garza  [address] [place]  [facility] [place] [address]        Dear Colleague,    Thank you for referring your patient, Mishel Garza, to the [facility]. Please see a copy of my visit note below.    Visit details reviewed.  Agree with plan as discussed.

## 2025-01-07 NOTE — CONFIDENTIAL NOTE
If a good day then lows  50's and 60's  Current system no alarms  Waking in the night  Sweaty, chills, shakey  3 days a week  Banana or orange juice    Highs 300's  Because of food choices  Highest 340  1 time daily in the morning    Wake-up weekdays 5:45 M-Th  Depends, sometimes stays up, others days goes back to sleep  Gets things ready for daughter, ready for work  Weekends 6 for daughters medications then go back to sleep  Daughter comes home about 1pm  More chores  HS-10-10:30  Lunch hard, not home or not sure what to make so doesn't   B-thin cut 9 grain bread with peanut butter  Part banana and berries  Diet Coke  D-protein, salad-not always protein, fruit  Low sugar Yi or italian  S-good or bad  Cheese and crackers, nuts, chips, bar-whatever is in the house, goldfish  Needs sweets in the house for daughter   Veggies are harder  Yogurt-no  Cottage cheese-no      Sleep  Good  Gets up 1-2 times for bathroom  Most times able to fall back to sleep  Most days feels rested    Stress  Diabetes-wants to live and take care of self-trouble following through  Daughter's stuff  Thinking about it 7-8/10  Every day, not all the time  Otherwise lower    Hardest part food and activity    Activity  Has treadmill  [employer] swimming  If meeting with someone will be more active  Daughter field and track in spring, bowling in the fall

## 2025-01-16 ENCOUNTER — TELEPHONE (OUTPATIENT)
Dept: FAMILY MEDICINE | Facility: CLINIC | Age: 70
End: 2025-01-16
Payer: MEDICARE

## 2025-01-16 NOTE — TELEPHONE ENCOUNTER
"I see that clinic is current working with patient in regards to obtaining the Abran 3 [place].  We are currently unable to fill FREESTYLE ABRAN 3 PLUS SENSORS for the patient due to patient not meeting Medicare Guidelines.   In order to meet guidelines we need the followin/7/2025 Diabetic Education visit to be completed and signed.  Then for Medicare compliance, MD Shante Cohen will need to create an addendum to visit notes stating \"reviewed and agree with plan discussed\" and then cosign the visit notes.    Or - we can use the last visit with MD Cohen from 2024, but an addendum to those notes will also be required to state that patient is testing blood sugars with Abran 3.      Please also note that patient cannot upgrade to Abran 3 PLUS Sensors until she uses up her current supply of Abran 14 Day sensors. Based on the last refill date, the earliest date we can re bill Medicare for sensors is 2025.    Please let us know if there are any questions or concerns.   Thank you!    Diabetes Care Services Pharmacy Team  [facility]  Phone: [phone]  Fax: [phone]  Pool: Pharm Diabetes   "

## 2025-01-21 ENCOUNTER — TELEPHONE (OUTPATIENT)
Dept: ENDOCRINOLOGY | Facility: CLINIC | Age: 70
End: 2025-01-21
Payer: MEDICARE

## 2025-01-21 DIAGNOSIS — Z79.4 TYPE 2 DIABETES MELLITUS WITH DIABETIC POLYNEUROPATHY, WITH LONG-TERM CURRENT USE OF INSULIN (H): ICD-10-CM

## 2025-01-21 DIAGNOSIS — E11.42 TYPE 2 DIABETES MELLITUS WITH DIABETIC POLYNEUROPATHY, WITH LONG-TERM CURRENT USE OF INSULIN (H): ICD-10-CM

## 2025-01-21 DIAGNOSIS — E16.2 HYPOGLYCEMIA: ICD-10-CM

## 2025-01-23 NOTE — TELEPHONE ENCOUNTER
Central Prior Authorization Team   Phone: [phone]    PA Initiation    Medication:   Insurance Company: Serge Romanoasya - Phone [phone] Fax [phone]  Pharmacy Filling the Rx: [facility] [address] [place] [address]  Filling Pharmacy Phone: [phone]  Filling Pharmacy Fax:    Start Date: 1/23/2025      Initiate PA by phone [phone] Case # [id number]

## 2025-01-27 NOTE — TELEPHONE ENCOUNTER
PRIOR AUTHORIZATION DENIED    Medication: Continuous Glucose Sensor (FREESTYLE ELLYN 3 PLUS SENSOR) MISC -PA DENIED     Denial Date: 1/23/2025    Denial Rational:           Appeal Information:

## 2025-01-28 RX ORDER — HYDROCHLOROTHIAZIDE 12.5 MG/1
CAPSULE ORAL
Qty: 6 EACH | Refills: 3 | Status: SHIPPED | OUTPATIENT
Start: 2025-01-28

## 2025-01-29 ENCOUNTER — MYC MEDICAL ADVICE (OUTPATIENT)
Dept: EDUCATION SERVICES | Facility: CLINIC | Age: 70
End: 2025-01-29
Payer: MEDICARE

## 2025-02-18 ENCOUNTER — ALLIED HEALTH/NURSE VISIT (OUTPATIENT)
Dept: EDUCATION SERVICES | Facility: CLINIC | Age: 70
End: 2025-02-18
Payer: MEDICARE

## 2025-02-18 DIAGNOSIS — E13.9 LADA (LATENT AUTOIMMUNE DIABETES IN ADULTS), MANAGED AS TYPE 1 (H): Primary | ICD-10-CM

## 2025-02-18 PROCEDURE — G0108 DIAB MANAGE TRN  PER INDIV: HCPCS

## 2025-02-18 NOTE — LETTER
2/18/2025         RE: Mishel Garza  [address] [facility] [address]        Dear Colleague,    Thank you for referring your patient, Mishel Garza, to the [facility]. Please see a copy of my visit note below.    Diabetes Self-Management Education & Support    Presents for:      Type of Service: In Person Visit      Assessment  Mishel is here alone today for a follow up.  She recently was diagnosed with BRIANA Diabetes and is insulin dependent.  She has been trying to navigate this.    She is currently taking Tresiba 32 units once daily and Novolog 6 units before meals.  Stated she has missed her Novolog a couple times since she started taking it a couple weeks ago.  Reviewed injection sites as her abdomen is getting sore doing four injections daily.    Mishel put on a her new Abran 3 sensor last night.  She is using a reader with this.  This was downloaded today, less than 24 hours of data available.    This morning she had 2 pieces of toast with a small amount of peanut butter and a handful of blueberries before leaving to go to the grocery store.  She tool 6 units of Novolog.  When she was on her way home from the store her low alarms started going off.  Stated she was sweaty and lightheaded.  She stopped at the gas station and got something to eat.  After she felt better she drove home.    We discussed carrying something with her at all times to treat a low blood sugar.  Reviewed best options.  Discussed rule of 15 and eating something with fat or protein once she is feeling better.      Stated this has happened a couple times since she started the meal time insulin, she has always been at home.  Due to this we will lower her meal time insulin dose.    When asked how she is feeling about the changes in her life.  Stated she is frustrated and angry.  This is much harder than she thought it was going to be.  Discussed her feelings are valid and okay.  It takes time to figure out  what her best dose is going to be and find the balance between lows and highs.    All additional questions and concerns addressed today.     Patient's most recent   Lab Results   Component Value Date    A1C 14.9 11/21/2024     is not meeting goal of <8.0    Diabetes knowledge and skills assessment:   Patient is knowledgeable in diabetes management concepts related to: Healthy Eating and Taking Medication    Based on learning assessment above, most appropriate setting for further diabetes education would be: Individual setting.    Care Plan and Education Provided:  Monitoring: Individual glucose targets, Log and interpret results, and CGM instruction: Patient was instructed on Freestyle Abrna System: Freestyle Abran sensor: insertion technique, sensor site location and rotation, insulin administration in relation to sensor placement, sensor wear, reasons to remove sensor (MRI, CT, diathermy), Vitamin C & Aspirin effects on sensor, Abran Shunk: frequency of scanning sensor, length of time data is visible, use of built in glucose meter, Precision X-tra test strips, and Use of trends and graphs for pattern management and problem solving, Taking Medication: Action of prescribed medication(s) and Proper site selection and rotation for injections, Problem Solving: Low glucose - causes, signs/symptoms, treatment and prevention and Rule of 15 and carrying a carbohydrate source at all times in case of low glucose, and Healthy Coping: Recognize feelings about diagnosis and Utilize support systems    Patient verbalized understanding of diabetes self-management education concepts discussed, opportunities for ongoing education and support, and recommendations provided today.    Plan    Will decrease Novolog to 4 units with meals.  If she is having a higher carb meal, continue with 6 units.  She has a follow up appointment with her primary next week.  Discussed getting reader downloaded at that time.    Topics to cover at  "upcoming visits: Monitoring, Taking Medication, and Healthy Coping    Follow-up:  Upcoming Diabetes Ed Appointments     Visit Type Date Time Department    DIABETES ED 2/18/2025  2:30 PM [facility]        See Care Plan for co-developed, patient-state behavior change goals.    Education Materials Provided:  No new materials provided today      Subjective/Objective  Mishel is an 69 year old year old, presenting for the following diabetes education related to:    Cultural Influences/Ethnic Background:  Not  or       Diabetes Symptoms & Complications:          Patient Problem List and Family Medical History reviewed for relevant medical history, current medical status, and diabetes risk factors.    Vitals:  LMP  (LMP Unknown)   Estimated body mass index is 26.26 kg/m  as calculated from the following:    Height as of 11/21/24: 1.588 m (5' 2.5\").    Weight as of 1/7/25: 66.2 kg (145 lb 14.4 oz).   Last 3 BP:   BP Readings from Last 3 Encounters:   11/21/24 122/64   11/15/24 (!) 158/82   07/24/24 122/68       History   Smoking Status     Never   Smokeless Tobacco     Never       Labs:  Lab Results   Component Value Date    A1C 14.9 11/21/2024     Lab Results   Component Value Date    GLC 70 11/21/2024    GLC 75 02/24/2021     Lab Results   Component Value Date    LDL 72 11/21/2024     Direct Measure HDL   Date Value Ref Range Status   11/21/2024 87 >=50 mg/dL Final   ]  GFR Estimate   Date Value Ref Range Status   11/21/2024 >90 >60 mL/min/1.73m2 Final     Comment:     eGFR calculated using 2021 CKD-EPI equation.   02/24/2021 >60 >60 mL/min/1.73m2 Final     GFR Estimate If Black   Date Value Ref Range Status   02/24/2021 >60 >60 mL/min/1.73m2 Final     Lab Results   Component Value Date    CR 0.49 11/21/2024     No results found for: \"MICROALBUMIN\"      Monitoring:           Taking Medications:  Diabetes Medication(s)       Biguanides       metFORMIN (GLUCOPHAGE) 500 MG tablet TAKE 2 TABLETS BY " MOUTH TWICE DAILY WITH FOOD       Insulin       insulin aspart (NOVOLOG PEN) 100 UNIT/ML pen Inject 6 Units subcutaneously 3 times daily (with meals). Dose increase. Max daily dose 30 units.     TRESIBA FLEXTOUCH 100 UNIT/ML pen Inject 32 Units subcutaneously at bedtime. Dose increase for next refill.       Sodium-Glucose Co-Transporter 2 (SGLT2) Inhibitors       canagliflozin (INVOKANA) 100 MG tablet Take 1 tablet (100 mg) by mouth every morning (before breakfast).               Healthy Coping:       Akiko Schuster RN  Time Spent: 30 minutes  Encounter Type: Individual    Any diabetes medication dose changes were made via the [facility] Standing Orders under the patient's referring provider.

## 2025-02-18 NOTE — PROGRESS NOTES
Diabetes Self-Management Education & Support    Presents for:      Type of Service: In Person Visit      Assessment  Mishel is here alone today for a follow up.  She recently was diagnosed with BRIANA Diabetes and is insulin dependent.  She has been trying to navigate this.    She is currently taking Tresiba 32 units once daily and Novolog 6 units before meals.  Stated she has missed her Novolog a couple times since she started taking it a couple weeks ago.  Reviewed injection sites as her abdomen is getting sore doing four injections daily.    Mishel put on a her new Abran 3 sensor last night.  She is using a reader with this.  This was downloaded today, less than 24 hours of data available.    This morning she had 2 pieces of toast with a small amount of peanut butter and a handful of blueberries before leaving to go to the grocery store.  She tool 6 units of Novolog.  When she was on her way home from the store her low alarms started going off.  Stated she was sweaty and lightheaded.  She stopped at the gas station and got something to eat.  After she felt better she drove home.    We discussed carrying something with her at all times to treat a low blood sugar.  Reviewed best options.  Discussed rule of 15 and eating something with fat or protein once she is feeling better.      Stated this has happened a couple times since she started the meal time insulin, she has always been at home.  Due to this we will lower her meal time insulin dose.    When asked how she is feeling about the changes in her life.  Stated she is frustrated and angry.  This is much harder than she thought it was going to be.  Discussed her feelings are valid and okay.  It takes time to figure out what her best dose is going to be and find the balance between lows and highs.    All additional questions and concerns addressed today.     Patient's most recent   Lab Results   Component Value Date    A1C 14.9 11/21/2024     is not meeting goal of  <8.0    Diabetes knowledge and skills assessment:   Patient is knowledgeable in diabetes management concepts related to: Healthy Eating and Taking Medication    Based on learning assessment above, most appropriate setting for further diabetes education would be: Individual setting.    Care Plan and Education Provided:  Monitoring: Individual glucose targets, Log and interpret results, and CGM instruction: Patient was instructed on Freestyle Abran System: Freestyle Abran sensor: insertion technique, sensor site location and rotation, insulin administration in relation to sensor placement, sensor wear, reasons to remove sensor (MRI, CT, diathermy), Vitamin C & Aspirin effects on sensor, Abran Rogers: frequency of scanning sensor, length of time data is visible, use of built in glucose meter, Precision X-tra test strips, and Use of trends and graphs for pattern management and problem solving, Taking Medication: Action of prescribed medication(s) and Proper site selection and rotation for injections, Problem Solving: Low glucose - causes, signs/symptoms, treatment and prevention and Rule of 15 and carrying a carbohydrate source at all times in case of low glucose, and Healthy Coping: Recognize feelings about diagnosis and Utilize support systems    Patient verbalized understanding of diabetes self-management education concepts discussed, opportunities for ongoing education and support, and recommendations provided today.    Plan    Will decrease Novolog to 4 units with meals.  If she is having a higher carb meal, continue with 6 units.  She has a follow up appointment with her primary next week.  Discussed getting reader downloaded at that time.    Topics to cover at upcoming visits: Monitoring, Taking Medication, and Healthy Coping    Follow-up:  Upcoming Diabetes Ed Appointments     Visit Type Date Time Department    DIABETES ED 2/18/2025  2:30 PM [facility]        See Care Plan for co-developed,  "patient-state behavior change goals.    Education Materials Provided:  No new materials provided today      Subjective/Objective  Mishel is an 69 year old year old, presenting for the following diabetes education related to:    Cultural Influences/Ethnic Background:  Not  or       Diabetes Symptoms & Complications:          Patient Problem List and Family Medical History reviewed for relevant medical history, current medical status, and diabetes risk factors.    Vitals:  LMP  (LMP Unknown)   Estimated body mass index is 26.26 kg/m  as calculated from the following:    Height as of 11/21/24: 1.588 m (5' 2.5\").    Weight as of 1/7/25: 66.2 kg (145 lb 14.4 oz).   Last 3 BP:   BP Readings from Last 3 Encounters:   11/21/24 122/64   11/15/24 (!) 158/82   07/24/24 122/68       History   Smoking Status    Never   Smokeless Tobacco    Never       Labs:  Lab Results   Component Value Date    A1C 14.9 11/21/2024     Lab Results   Component Value Date    GLC 70 11/21/2024    GLC 75 02/24/2021     Lab Results   Component Value Date    LDL 72 11/21/2024     Direct Measure HDL   Date Value Ref Range Status   11/21/2024 87 >=50 mg/dL Final   ]  GFR Estimate   Date Value Ref Range Status   11/21/2024 >90 >60 mL/min/1.73m2 Final     Comment:     eGFR calculated using 2021 CKD-EPI equation.   02/24/2021 >60 >60 mL/min/1.73m2 Final     GFR Estimate If Black   Date Value Ref Range Status   02/24/2021 >60 >60 mL/min/1.73m2 Final     Lab Results   Component Value Date    CR 0.49 11/21/2024     No results found for: \"MICROALBUMIN\"      Monitoring:           Taking Medications:  Diabetes Medication(s)       Biguanides       metFORMIN (GLUCOPHAGE) 500 MG tablet TAKE 2 TABLETS BY MOUTH TWICE DAILY WITH FOOD       Insulin       insulin aspart (NOVOLOG PEN) 100 UNIT/ML pen Inject 6 Units subcutaneously 3 times daily (with meals). Dose increase. Max daily dose 30 units.     TRESIBA FLEXTOUCH 100 UNIT/ML pen Inject 32 Units " subcutaneously at bedtime. Dose increase for next refill.       Sodium-Glucose Co-Transporter 2 (SGLT2) Inhibitors       canagliflozin (INVOKANA) 100 MG tablet Take 1 tablet (100 mg) by mouth every morning (before breakfast).               Healthy Coping:       Akiko Schuster RN  Time Spent: 30 minutes  Encounter Type: Individual    Any diabetes medication dose [facility] the patient's referring provider.

## 2025-02-23 PROBLEM — E13.40: Status: ACTIVE | Noted: 2025-02-23

## 2025-02-25 ENCOUNTER — OFFICE VISIT (OUTPATIENT)
Dept: FAMILY MEDICINE | Facility: CLINIC | Age: 70
End: 2025-02-25
Payer: MEDICARE

## 2025-02-25 VITALS
HEIGHT: 63 IN | TEMPERATURE: 98 F | OXYGEN SATURATION: 97 % | HEART RATE: 100 BPM | DIASTOLIC BLOOD PRESSURE: 74 MMHG | RESPIRATION RATE: 16 BRPM | WEIGHT: 158.6 LBS | SYSTOLIC BLOOD PRESSURE: 132 MMHG | BODY MASS INDEX: 28.1 KG/M2

## 2025-02-25 DIAGNOSIS — E13.40: Primary | ICD-10-CM

## 2025-02-25 DIAGNOSIS — Z12.11 SCREEN FOR COLON CANCER: ICD-10-CM

## 2025-02-25 DIAGNOSIS — E87.6 HYPOKALEMIA: ICD-10-CM

## 2025-02-25 DIAGNOSIS — I27.20 PULMONARY HYPERTENSION, MILD (H): ICD-10-CM

## 2025-02-25 DIAGNOSIS — R60.9 EDEMA, UNSPECIFIED TYPE: ICD-10-CM

## 2025-02-25 LAB
ALBUMIN SERPL BCG-MCNC: 4 G/DL (ref 3.5–5.2)
ALP SERPL-CCNC: 96 U/L (ref 40–150)
ALT SERPL W P-5'-P-CCNC: 54 U/L (ref 0–50)
ANION GAP SERPL CALCULATED.3IONS-SCNC: 10 MMOL/L (ref 7–15)
AST SERPL W P-5'-P-CCNC: 44 U/L (ref 0–45)
BILIRUB SERPL-MCNC: 0.2 MG/DL
BUN SERPL-MCNC: 23.1 MG/DL (ref 8–23)
CALCIUM SERPL-MCNC: 9.8 MG/DL (ref 8.8–10.4)
CHLORIDE SERPL-SCNC: 105 MMOL/L (ref 98–107)
CREAT SERPL-MCNC: 0.58 MG/DL (ref 0.51–0.95)
EGFRCR SERPLBLD CKD-EPI 2021: >90 ML/MIN/1.73M2
EST. AVERAGE GLUCOSE BLD GHB EST-MCNC: 306 MG/DL
GLUCOSE SERPL-MCNC: 160 MG/DL (ref 70–99)
HBA1C MFR BLD: 12.3 % (ref 0–5.6)
HCO3 SERPL-SCNC: 27 MMOL/L (ref 22–29)
HOLD SPECIMEN: NORMAL
POTASSIUM SERPL-SCNC: 4.4 MMOL/L (ref 3.4–5.3)
PROT SERPL-MCNC: 6.5 G/DL (ref 6.4–8.3)
SODIUM SERPL-SCNC: 142 MMOL/L (ref 135–145)

## 2025-02-25 PROCEDURE — 3078F DIAST BP <80 MM HG: CPT | Performed by: FAMILY MEDICINE

## 2025-02-25 PROCEDURE — 1126F AMNT PAIN NOTED NONE PRSNT: CPT | Performed by: FAMILY MEDICINE

## 2025-02-25 PROCEDURE — 83036 HEMOGLOBIN GLYCOSYLATED A1C: CPT | Performed by: FAMILY MEDICINE

## 2025-02-25 PROCEDURE — 99214 OFFICE O/P EST MOD 30 MIN: CPT | Performed by: FAMILY MEDICINE

## 2025-02-25 PROCEDURE — 3075F SYST BP GE 130 - 139MM HG: CPT | Performed by: FAMILY MEDICINE

## 2025-02-25 PROCEDURE — 80053 COMPREHEN METABOLIC PANEL: CPT | Performed by: FAMILY MEDICINE

## 2025-02-25 RX ORDER — FUROSEMIDE 20 MG/1
20 TABLET ORAL DAILY PRN
Qty: 20 TABLET | Refills: 1 | Status: SHIPPED | OUTPATIENT
Start: 2025-02-25

## 2025-02-25 ASSESSMENT — PAIN SCALES - GENERAL: PAINLEVEL_OUTOF10: NO PAIN (0)

## 2025-02-25 NOTE — PROGRESS NOTES
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  Struggles a bit with motivation she admits but improving.  Continue titration under the direction of diabetes education.  - Extra Tube; Future  - Extra Tube    Edema, unspecified type  This is worsened with discontinuation of metformin.  Interestingly, in the past she found a similar effect with discontinuation of metformin, improvement in edema with reinitiation of metformin.  While she does not have type 2 diabetes, her 32 unit need of Tresiba suggest some insulin resistance, so I think there may be benefit to continuing metformin in light of her worsened edema.  Resume metformin at 500 mg twice daily and monitor edema.  Will also initiate furosemide daily as needed, advised that she consider taking it 3 to 5 days until edema improved, then reduce.  Will check potassium level today in light of furosemide need, remains on potassium supplement.  Continue low-salt diet.  - furosemide (LASIX) 20 MG tablet; Take 1 tablet (20 mg) by mouth daily as needed (swelling).  - Comprehensive metabolic panel; Future  - Comprehensive metabolic panel    Hypokalemia  We will recheck calcium level today.  - Extra Tube; Future  - Extra Tube    Pulmonary hypertension, mild (H)  Chronic, could be contributing to her experience of edema potentially though she is not having any respiratory symptoms.  Will follow.    Screen for colon cancer  Discussed importance of colon cancer screening, she is not yet ready and declines referral today.          BMI  Estimated body mass index is 28.55 kg/m  as calculated from the following:    Height as of this encounter: 1.588 m (5' 2.5\").    Weight as of this encounter: 71.9 kg (158 lb 9.6 oz).     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  She has started using vadim 3 sensor for the past week or so, has found this helpful.  She is identified a few bouts of hypoglycemia.  Has been working with diabetes education to adjust her mealtime insulin, remains on Tresiba 32 units with good compliance.  Admits she sometimes forgets to take her insulin until an hour or 2 later the overall she has been pretty good.  Unfortunately she has noted worsening of edema over the last week or 2, she would believes that this started when she stopped her metformin shortness of breath, chest pain, or change in activity tolerance.  No other changes in medication.  Prior history of pulmonary hypertension.  We reviewed that in the past she has had significant edema onset and then noted response when metformin was started, recurrence when metformin was discontinued.  Remains on potassium supplement due to previous history of hypokalemia.    History of Present Illness       Diabetes:   She presents for follow up of diabetes.  She is checking home blood glucose four or more times daily.   She checks blood glucose before and after meals and at bedtime.  Blood glucose is sometimes over 200 and sometimes under 70. She is aware of hypoglycemia symptoms including blurred vision and other.   She is concerned about low blood sugar, several less than 70 in the past few weeks.   She is having numbness in feet.            She eats 4 or more servings of fruits and vegetables daily.She consumes 0 sweetened beverage(s) daily.She exercises with enough effort to increase her heart rate 9 or less minutes per day.  She exercises with enough effort to increase her heart  "rate 3 or less days per week.   She is taking medications regularly.                     Objective    /74   Pulse 100   Temp 98  F (36.7  C) (Oral)   Resp 16   Ht 1.588 m (5' 2.5\")   Wt 71.9 kg (158 lb 9.6 oz)   LMP  (LMP Unknown)   SpO2 97%   BMI 28.55 kg/m    Body mass index is 28.55 kg/m .  Physical Exam   Alert very pleasant female.  Mucous membranes moist.  Heart with regular rhythm.  Lungs clear.  She has no crackles or rales.  1+ pitting edema to midshin bilaterally.            Signed Electronically by: Shante Cohen MD    "

## 2025-02-25 NOTE — PATIENT INSTRUCTIONS
Continue to work on healthy eating, regular exercise, and taking your insulin prior to eating.    For your swelling, I have sent a prescription for furosemide to take once daily as needed for swelling.  Consider taking it for a few days in a row to help with your current swelling.  I also recommend restarting metformin 500 mg twice daily.  In the past, metformin has been helpful in manage your your swelling, though the reason for this is not clear.    Let me know when you are ready for a colonoscopy referral.

## 2025-03-20 ENCOUNTER — TELEPHONE (OUTPATIENT)
Dept: FAMILY MEDICINE | Facility: CLINIC | Age: 70
End: 2025-03-20
Payer: MEDICARE

## 2025-03-20 NOTE — TELEPHONE ENCOUNTER
Please addend 11/21/2024 office visit to include that you spoke about pts CGM usage or that the pt is using a CGM. Currently its only listed in the meds section and that doesn't comply for medicare. This is needed very urgently so please complete asap. It MUST be addended on this specific visit. Thank you

## 2025-03-29 DIAGNOSIS — R60.9 EDEMA, UNSPECIFIED TYPE: ICD-10-CM

## 2025-03-31 RX ORDER — FUROSEMIDE 20 MG/1
TABLET ORAL
Qty: 20 TABLET | Refills: 4 | Status: SHIPPED | OUTPATIENT
Start: 2025-03-31

## 2025-04-24 ENCOUNTER — TRANSFERRED RECORDS (OUTPATIENT)
Dept: HEALTH INFORMATION MANAGEMENT | Facility: CLINIC | Age: 70
End: 2025-04-24
Payer: MEDICARE

## 2025-04-24 LAB — RETINOPATHY: POSITIVE

## 2025-05-08 DIAGNOSIS — E11.42 TYPE 2 DIABETES MELLITUS WITH DIABETIC POLYNEUROPATHY, WITH LONG-TERM CURRENT USE OF INSULIN (H): ICD-10-CM

## 2025-05-08 DIAGNOSIS — Z79.4 TYPE 2 DIABETES MELLITUS WITH DIABETIC POLYNEUROPATHY, WITH LONG-TERM CURRENT USE OF INSULIN (H): ICD-10-CM

## 2025-05-08 RX ORDER — PEN NEEDLE, DIABETIC 32GX 5/32"
NEEDLE, DISPOSABLE MISCELLANEOUS
Qty: 100 EACH | Refills: 4 | Status: SHIPPED | OUTPATIENT
Start: 2025-05-08

## 2025-05-28 ENCOUNTER — OFFICE VISIT (OUTPATIENT)
Dept: FAMILY MEDICINE | Facility: CLINIC | Age: 70
End: 2025-05-28
Payer: MEDICARE

## 2025-05-28 VITALS
BODY MASS INDEX: 28.53 KG/M2 | SYSTOLIC BLOOD PRESSURE: 126 MMHG | HEART RATE: 84 BPM | DIASTOLIC BLOOD PRESSURE: 68 MMHG | HEIGHT: 63 IN | OXYGEN SATURATION: 95 % | WEIGHT: 161 LBS | RESPIRATION RATE: 16 BRPM | TEMPERATURE: 98 F

## 2025-05-28 DIAGNOSIS — E13.40: Primary | ICD-10-CM

## 2025-05-28 DIAGNOSIS — B35.3 TINEA PEDIS OF BOTH FEET: ICD-10-CM

## 2025-05-28 DIAGNOSIS — Z78.0 ASYMPTOMATIC POSTMENOPAUSAL STATUS: ICD-10-CM

## 2025-05-28 DIAGNOSIS — M85.80 OSTEOPENIA, UNSPECIFIED LOCATION: ICD-10-CM

## 2025-05-28 DIAGNOSIS — R74.01 ELEVATED ALT MEASUREMENT: ICD-10-CM

## 2025-05-28 LAB
EST. AVERAGE GLUCOSE BLD GHB EST-MCNC: 194 MG/DL
HBA1C MFR BLD: 8.4 % (ref 0–5.6)
HOLD SPECIMEN: NORMAL
HOLD SPECIMEN: NORMAL

## 2025-05-28 RX ORDER — CLOTRIMAZOLE 1 %
CREAM (GRAM) TOPICAL 2 TIMES DAILY
Qty: 30 G | Refills: 1 | Status: SHIPPED | OUTPATIENT
Start: 2025-05-28 | End: 2025-06-11

## 2025-05-28 ASSESSMENT — PAIN SCALES - GENERAL: PAINLEVEL_OUTOF10: NO PAIN (0)

## 2025-05-28 NOTE — PROGRESS NOTES
Prior to immunization administration, verified patients identity using patient s name and date of birth. Please see Immunization Activity for additional information.     Screening Questionnaire for Adult Immunization    Are you sick today?   No   Do you have allergies to medications, food, a vaccine component or latex?   No   Have you ever had a serious reaction after receiving a vaccination?   No   Do you have a long-term health problem with heart, lung, kidney, or metabolic disease (e.g., diabetes), asthma, a blood disorder, no spleen, complement component deficiency, a cochlear implant, or a spinal fluid leak?  Are you on long-term aspirin therapy?   No   Do you have cancer, leukemia, HIV/AIDS, or any other immune system problem?   No   Do you have a parent, brother, or sister with an immune system problem?   No   In the past 3 months, have you taken medications that affect  your immune system, such as prednisone, other steroids, or anticancer drugs; drugs for the treatment of rheumatoid arthritis, Crohn s disease, or psoriasis; or have you had radiation treatments?   No   Have you had a seizure, or a brain or other nervous system problem?   No   During the past year, have you received a transfusion of blood or blood    products, or been given immune (gamma) globulin or antiviral drug?   No   For women: Are you pregnant or is there a chance you could become       pregnant during the next month?   No   Have you received any vaccinations in the past 4 weeks?   No     Immunization questionnaire answers were all negative.      Patient instructed to remain in clinic for 15 minutes afterwards, and to report any adverse reactions.     Screening performed by Sariah Obrien CMA on 5/28/2025 at 7:43 AM.

## 2025-05-28 NOTE — PROGRESS NOTES
[facility]  [address]  [facility] [address]  Phone: [phone]  Fax: [phone]    Patient:  Mishel Garza, Date of birth 1955  Date of Visit:  05/28/2025  Referring Provider No ref. 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 Current A1c is 8.4, with a goal of less than 7.  Time in range approaching goal of 70%, though currently at 63%.  - Continue monitoring blood sugar levels with vadim 3 m. Maintain exercise routine and control blood sugar and blood pressure. No changes to insulin regimen discussed.    Elevated ALT measurement  - Previous liver function test showed elevated ALT. Potential causes discussed include exercise and medication effects.  - Recheck liver function tests. If still abnormal, consider liver ultrasound to assess for structural changes or fat accumulation. Continue exercise, hydration, and blood sugar control.  Remain off alcohol.    Tinea pedis of both feet  - Dry skin and flakiness consistent with athlete's foot.  - Prescribe clotrimazole cream. Apply between toes and underneath feet twice daily for 14 days. Ensure feet are thoroughly dried after showering.       The longitudinal plan of care for the diagnosis(es)/condition(s) as documented were addressed during this visit. Due to the added complexity in care, I will continue to support Mishel in the subsequent management and with ongoing continuity of care.               Subjective   Mishel is a 70 year old female who presents for Diabetes (fasting)  History of Present Illness    Diabetes Management  - Blood sugar levels sometimes rise without eating, described as frustrating.  - Experiences delayed response to food intake when blood sugar is low, taking more than 15  "minutes to stabilize.  - Uses a Abran 3 sensor for monitoring; reports being in target range more than not in the last seven days.  - Time in range of 63%, time low less than 1%.  - Engages in exercise, including leg exercises and walking, 3 to 4 times a week.  - Celebrated a birthday recently, consumed cake and pizza, but managed blood sugar levels with usual insulin dosing.  - Reports not needing to adjust insulin dose significantly for occasional high sugar levels at night.    Skin Condition  - Noticed dry skin on one side, no redness or rash observed.  - Suspects athlete's foot due to flaky skin between toes.         Pertinent history obtain from: patient    Objective     Vital signs:  /68   Pulse 84   Temp 98  F (36.7  C) (Oral)   Resp 16   Ht 1.588 m (5' 2.5\")   Wt 73 kg (161 lb)   LMP  (LMP Unknown)   SpO2 95%   BMI 28.98 kg/m    Blood pressure 126/68, pulse 84, temperature 98  F (36.7  C), temperature source Oral, resp. rate 16, height 1.588 m (5' 2.5\"), weight 73 kg (161 lb), SpO2 95%.  Physical Exam    Alert very pleasant.  Mucous membranes moist.  Heart with regular rate and rhythm.  Lungs clear. Diabetic foot exam: normal DP and PT pulses, no trophic changes or ulcerative lesions, normal sensory exam, and tinea pedis between 4th and 5th toes of both feet, some dryness of skin along the lateral aspect of both feet.        Results    - A1c: 8.4  - Liver function test: previously noted as slightly elevated, recheck ordered  - Urine sample: checking for protein to assess kidney function, results pending       Laboratory data and imaging listed below was reviewed prior to this encounter.             Consent was obtained from the patient to use an AI documentation tool in the creation of  this note          Answers submitted by the patient for this visit:  Diabetes Visit (Submitted on 5/26/2025)  Chief Complaint: Chronic problems general questions HPI Form  Frequency of checking blood sugars:: " continous glucose monitor  What time of day are you checking your blood sugars : before and after meals, at bedtime  Have you had any blood sugars above 200?: Yes  Have you had any blood sugars below 70?: Yes  Hypoglycemia symptoms:: blurred vision, other  Diabetic concerns:: none  Paraesthesia present:: numbness in feet, weight gain  General Questionnaire (Submitted on 5/26/2025)  Chief Complaint: Chronic problems general questions HPI Form  How many servings of fruits and vegetables do you eat daily?: 4 or more  On average, how many sweetened beverages do you drink each day (Examples: soda, juice, sweet tea, etc.  Do NOT count diet or artificially sweetened beverages)?: 0  How many minutes a day do you exercise enough to make your heart beat faster?: 10 to 19  How many days a week do you exercise enough to make your heart beat faster?: 3 or less  How many days per week do you miss taking your medication?: 0  Questionnaire about: Chronic problems general questions HPI Form (Submitted on 5/26/2025)  Chief Complaint: Chronic problems general questions HPI Form

## 2025-05-29 LAB
ALBUMIN SERPL BCG-MCNC: 4.2 G/DL (ref 3.5–5.2)
ALP SERPL-CCNC: 91 U/L (ref 40–150)
ALT SERPL W P-5'-P-CCNC: 31 U/L (ref 0–50)
ANION GAP SERPL CALCULATED.3IONS-SCNC: 8 MMOL/L (ref 7–15)
AST SERPL W P-5'-P-CCNC: 21 U/L (ref 0–45)
BILIRUB SERPL-MCNC: 0.2 MG/DL
BUN SERPL-MCNC: 26.2 MG/DL (ref 8–23)
CALCIUM SERPL-MCNC: 10.4 MG/DL (ref 8.8–10.4)
CHLORIDE SERPL-SCNC: 99 MMOL/L (ref 98–107)
CREAT SERPL-MCNC: 0.74 MG/DL (ref 0.51–0.95)
CREAT UR-MCNC: 53.9 MG/DL
EGFRCR SERPLBLD CKD-EPI 2021: 87 ML/MIN/1.73M2
GLUCOSE SERPL-MCNC: 205 MG/DL (ref 70–99)
HCO3 SERPL-SCNC: 33 MMOL/L (ref 22–29)
MICROALBUMIN UR-MCNC: <12 MG/L
MICROALBUMIN/CREAT UR: NORMAL MG/G{CREAT}
POTASSIUM SERPL-SCNC: 5 MMOL/L (ref 3.4–5.3)
PROT SERPL-MCNC: 6.1 G/DL (ref 6.4–8.3)
SODIUM SERPL-SCNC: 140 MMOL/L (ref 135–145)

## 2025-06-04 ENCOUNTER — RESULTS FOLLOW-UP (OUTPATIENT)
Dept: FAMILY MEDICINE | Facility: CLINIC | Age: 70
End: 2025-06-04

## 2025-06-19 ENCOUNTER — MYC MEDICAL ADVICE (OUTPATIENT)
Dept: FAMILY MEDICINE | Facility: CLINIC | Age: 70
End: 2025-06-19
Payer: MEDICARE

## 2025-06-24 DIAGNOSIS — R60.9 EDEMA, UNSPECIFIED TYPE: ICD-10-CM

## 2025-06-24 RX ORDER — FUROSEMIDE 20 MG/1
TABLET ORAL
Qty: 20 TABLET | Refills: 3 | Status: SHIPPED | OUTPATIENT
Start: 2025-06-24

## 2025-07-14 DIAGNOSIS — E11.42 TYPE 2 DIABETES MELLITUS WITH DIABETIC POLYNEUROPATHY, WITH LONG-TERM CURRENT USE OF INSULIN (H): ICD-10-CM

## 2025-07-14 DIAGNOSIS — Z79.4 TYPE 2 DIABETES MELLITUS WITH DIABETIC POLYNEUROPATHY, WITH LONG-TERM CURRENT USE OF INSULIN (H): ICD-10-CM

## 2025-07-14 RX ORDER — INSULIN ASPART 100 [IU]/ML
INJECTION, SOLUTION INTRAVENOUS; SUBCUTANEOUS
Qty: 15 ML | Refills: 4 | Status: SHIPPED | OUTPATIENT
Start: 2025-07-14

## 2025-07-29 ENCOUNTER — PATIENT OUTREACH (OUTPATIENT)
Dept: CARE COORDINATION | Facility: CLINIC | Age: 70
End: 2025-07-29
Payer: MEDICARE

## 2025-08-07 DIAGNOSIS — Z79.4 TYPE 2 DIABETES MELLITUS WITH DIABETIC POLYNEUROPATHY, WITH LONG-TERM CURRENT USE OF INSULIN (H): ICD-10-CM

## 2025-08-07 DIAGNOSIS — E11.42 TYPE 2 DIABETES MELLITUS WITH DIABETIC POLYNEUROPATHY, WITH LONG-TERM CURRENT USE OF INSULIN (H): ICD-10-CM

## 2025-08-07 DIAGNOSIS — E16.2 HYPOGLYCEMIA: ICD-10-CM

## 2025-08-07 RX ORDER — HYDROCHLOROTHIAZIDE 12.5 MG/1
CAPSULE ORAL
Qty: 6 EACH | Refills: 1 | Status: SHIPPED | OUTPATIENT
Start: 2025-08-07

## 2025-08-28 ENCOUNTER — OFFICE VISIT (OUTPATIENT)
Dept: FAMILY MEDICINE | Facility: CLINIC | Age: 70
End: 2025-08-28
Payer: MEDICARE

## 2025-08-28 ENCOUNTER — PATIENT OUTREACH (OUTPATIENT)
Dept: FAMILY MEDICINE | Facility: CLINIC | Age: 70
End: 2025-08-28

## 2025-08-28 VITALS
BODY MASS INDEX: 29.59 KG/M2 | WEIGHT: 167 LBS | SYSTOLIC BLOOD PRESSURE: 143 MMHG | OXYGEN SATURATION: 97 % | RESPIRATION RATE: 12 BRPM | TEMPERATURE: 98.5 F | HEART RATE: 76 BPM | DIASTOLIC BLOOD PRESSURE: 81 MMHG | HEIGHT: 63 IN

## 2025-08-28 DIAGNOSIS — E13.40: Primary | ICD-10-CM

## 2025-08-28 DIAGNOSIS — R63.5 WEIGHT GAIN: ICD-10-CM

## 2025-08-28 DIAGNOSIS — R03.0 ELEVATED BLOOD PRESSURE READING WITHOUT DIAGNOSIS OF HYPERTENSION: ICD-10-CM

## 2025-08-28 LAB
EST. AVERAGE GLUCOSE BLD GHB EST-MCNC: 169 MG/DL
HBA1C MFR BLD: 7.5 % (ref 0–5.6)
HOLD SPECIMEN: NORMAL
T4 FREE SERPL-MCNC: 1.35 NG/DL (ref 0.9–1.7)
TSH SERPL DL<=0.005 MIU/L-ACNC: 7.85 UIU/ML (ref 0.3–4.2)

## 2025-08-28 RX ORDER — INSULIN DEGLUDEC 100 U/ML
24 INJECTION, SOLUTION SUBCUTANEOUS AT BEDTIME
Status: SHIPPED
Start: 2025-08-28

## 2025-08-28 ASSESSMENT — PAIN SCALES - GENERAL: PAINLEVEL_OUTOF10: NO PAIN (0)
